# Patient Record
Sex: FEMALE | Race: WHITE | NOT HISPANIC OR LATINO | Employment: FULL TIME | URBAN - METROPOLITAN AREA
[De-identification: names, ages, dates, MRNs, and addresses within clinical notes are randomized per-mention and may not be internally consistent; named-entity substitution may affect disease eponyms.]

---

## 2022-05-03 ENCOUNTER — OFFICE VISIT (OUTPATIENT)
Dept: BARIATRICS | Facility: CLINIC | Age: 57
End: 2022-05-03
Payer: COMMERCIAL

## 2022-05-03 VITALS
BODY MASS INDEX: 36.03 KG/M2 | HEART RATE: 66 BPM | WEIGHT: 229.6 LBS | DIASTOLIC BLOOD PRESSURE: 76 MMHG | TEMPERATURE: 98.6 F | SYSTOLIC BLOOD PRESSURE: 132 MMHG | HEIGHT: 67 IN

## 2022-05-03 DIAGNOSIS — M19.90 ARTHRITIS: ICD-10-CM

## 2022-05-03 DIAGNOSIS — E66.9 OBESITY, CLASS II, BMI 35-39.9: Primary | ICD-10-CM

## 2022-05-03 DIAGNOSIS — E78.5 HYPERLIPIDEMIA: ICD-10-CM

## 2022-05-03 PROBLEM — E66.812 OBESITY, CLASS II, BMI 35-39.9: Status: ACTIVE | Noted: 2022-05-03

## 2022-05-03 PROCEDURE — 99213 OFFICE O/P EST LOW 20 MIN: CPT | Performed by: NURSE PRACTITIONER

## 2022-05-03 RX ORDER — ALBUTEROL SULFATE 90 UG/1
AEROSOL, METERED RESPIRATORY (INHALATION)
COMMUNITY
Start: 2022-03-11

## 2022-05-03 RX ORDER — ALBUTEROL SULFATE 90 UG/1
2 AEROSOL, METERED RESPIRATORY (INHALATION) EVERY 6 HOURS PRN
COMMUNITY
Start: 2022-03-11

## 2022-05-03 RX ORDER — MECLIZINE HCL 12.5 MG/1
12.5 TABLET ORAL 3 TIMES DAILY PRN
COMMUNITY

## 2022-05-03 RX ORDER — MULTIVITAMIN
1 TABLET ORAL DAILY
COMMUNITY

## 2022-05-03 RX ORDER — PROPRANOLOL/HYDROCHLOROTHIAZID 40 MG-25MG
TABLET ORAL
COMMUNITY

## 2022-05-03 RX ORDER — IBUPROFEN 200 MG
200 TABLET ORAL EVERY 6 HOURS PRN
COMMUNITY

## 2022-05-03 NOTE — PROGRESS NOTES
Assessment/Plan:  Michael Randolph was seen today for consult  Diagnoses and all orders for this visit:    Obesity, Class II, BMI 35-39 9  - Discussed options of [facility]-Intensive Lifestyle Intervention Program, Very Low Calorie Diet-VLCD and Conservative Program and the role of weight loss medications  She would likely not qualify for weight loss surgery, but she is not interested in that  - Explained the importance of making lifestyle changes first before starting any anti-obesity medications  Patient should demonstrate lifestyle changes first before anti-obesity medication can be initiated  - Patient is interested in pursuing [facility]ensive Lifestyle Intervention Program  - Initial weight loss goal of 5-10% weight loss for improved health  - Weight loss can improve patient's co-morbid conditions and/or prevent weight-related complications  - Check CMP, TSH, A1C, Lipid panel and fasting insulin  Goals:  Do not skip meals  Food log (ie ) www myfitnesspal com,sparkpeople  com,loseit com,calorieking  com,etc  baritastic (use skinnytaste  com, dietdoctor  com or smartphone lisandra Joonto for recipes)  No sugary beverages  At least 64oz of water daily  Increase physical activity by 10 minutes daily  Gradually increase physical activity to a goal of 5 days per week for 30 minutes of MODERATE intensity PLUS 2 days per week of FULL BODY resistance training (use smartphone apps [employer], Home Workout, etc )  - Try to increase activity as tolerated  She would like to eventually be able to get back to HIIT workouts  - Increase water intake to at least 64 oz daily  Hyperlipidemia    Arthritis      Discussed in detail nonsurgical options including intensive lifestyle intervention program, very low-calorie diet program and conservative program   Discussed the role of weight loss medications  Counseled patient on diet behavior and exercise modification for weight loss        Follow up in approximately 3 months with Non-Surgical Physician/Advanced Practitioner  Subjective:   Chief Complaint   Patient presents with    Consult     MWM s/b -, goal wt 140       Patient ID: Annika Dang  is a 55 y o  female with excess weight/obesity here to pursue weight management  Previous notes and records have been reviewed  Past Medical History:   Diagnosis Date    Arthritis      Past Surgical History:   Procedure Laterality Date    FINGER FRACTURE SURGERY Right        HPI:  Wt Readings from Last 20 Encounters:   22 104 kg (229 lb 9 6 oz)     Obesity/Excess Weight:  Severity: Moderate  Onset:  Since childhood  Modifiers: Diet and Exercise, Physician Supervised Weight Loss Program and Commercial Weight Loss Programs-ie  Weight Watchers, Kin Friday, etc  Was successful in losing weight, but would gain it back  Contributing factors: Stress/Emotional Eating  Associated symptoms: increased joint pain, decreased self esteem and increased shortness of breath    Hydration: 30-60 oz water, 40 oz of black tea, 20 oz coffee with half and half, 1 Kombucha twice a month  Alcohol: 1 drink per week  Smoking: denies  Exercise: Not currently due to ankle pain  Occupation: drug and alcohol   Sleep: 7 hours  STOP ban/8    Highest weight: 245 lbs 6-8 years ago  Current weight: 229 6 lbs  Goal weight: 150 lbs    The following portions of the patient's history were reviewed and updated as appropriate: allergies, current medications, past family history, past medical history, past social history, past surgical history, and problem list     Review of Systems   HENT: Negative for sore throat  Respiratory: Negative for cough and shortness of breath  Cardiovascular: Negative for chest pain and palpitations  Gastrointestinal: Negative for constipation, diarrhea, nausea and vomiting  Denies GERD   Endocrine: Negative for cold intolerance and heat intolerance     Genitourinary: Negative for dysuria  Musculoskeletal: Positive for arthralgias (ankle, hip, knees)  Negative for back pain  Skin: Negative for rash  Neurological: Negative for headaches  Psychiatric/Behavioral: Negative for suicidal ideas (or HI)  Denies depression and anxiety       Objective:  /76 (BP Location: Right arm, Patient Position: Sitting, Cuff Size: Standard)   Pulse 66   Temp 98 6 °F (37 °C) (Tympanic)   Ht 5' 6 5" (1 689 m)   Wt 104 kg (229 lb 9 6 oz)   BMI 36 50 kg/m²     Physical Exam  Vitals and nursing note reviewed  Constitutional   General appearance: Abnormal   well developed and obese  Eyes No conjunctival injection  Ears, Nose, Mouth, and Throat Oral mucosa moist    Pulmonary   Respiratory effort: No increased work of breathing or signs of respiratory distress  Cardiovascular     Examination of extremities for edema and/or varicosities: Normal   no edema  Abdomen   Abdomen: Abnormal   The abdomen was obese      Musculoskeletal   Gait and station: Normal     Psychiatric   Orientation to person, place and time: Normal     Affect: appropriate

## 2022-06-03 LAB — HBA1C MFR BLD HPLC: 5.3 %

## 2022-06-06 ENCOUNTER — OFFICE VISIT (OUTPATIENT)
Dept: BARIATRICS | Facility: CLINIC | Age: 57
End: 2022-06-06

## 2022-06-06 VITALS — HEIGHT: 67 IN | BODY MASS INDEX: 36.09 KG/M2 | WEIGHT: 229.94 LBS

## 2022-06-06 DIAGNOSIS — R63.5 ABNORMAL WEIGHT GAIN: Primary | ICD-10-CM

## 2022-06-06 PROCEDURE — RECHECK: Performed by: DIETITIAN, REGISTERED

## 2022-06-06 PROCEDURE — WMPRO12: Performed by: DIETITIAN, REGISTERED

## 2022-06-06 NOTE — PROGRESS NOTES
Weight Management Medical Nutrition Assessment  presented for the New Start session with the [facility]   Today's weight is 229 94#  Per dietary recall patient consumes excess calories from emotional eating, boredom eating  Gained 30 lbs in covid which she attributes to caring for her mother and baking for her mother  We developed and reviewed a low calorie meal plan         Patient seen by Medical Provider in past 6 months:  yes  Requested to schedule appointment with Medical Provider: No      Anthropometric Measurements  Start Weight (#): 229 94  Current Weight (#): 229 94  TBW % Change from start weight: n/a  Ideal Body Weight (#):132 5  Goal Weight (#):140    Weight Loss History  Previous weight loss attempts: herbalife, nutrisystem, weight watchers, fad diets, whole 30, bariatrician, keto    Food and Nutrition Related History  Wake up: 6am   Bed Time:    Food Recall  Deeds her dogs  Emergen-C, glucosamine, tumeric, fish oil, vitamin D, MVI, 3 motrin  7am Breakfast: 3 eggs with mushrooms (sauteed with olive oil), 1 slice swiss cheese, 1 tbsp salsa-will do oatmeal with nut butter on weekends with SF dried cherries  - coffee with 1/2 and 1/2    10a Snack: 2 clementines  12-12:30p Lunch: cooks on weekends and freezes to use during the week- halupki casserole or stuffed pepper casserole or chili (made with tomatoes, ground meat) or tatooed  meals from [facility]  3p Snack: carrots/cucumbers with hummus or apple with nut butter  5:30-6p Dinner: rotisserie chicken, non-starchy vegetable  Snack: shot of bourbon    Beverages: coffee/tea, flavored seltzer water, water  Volume of beverage intake: 20 oz coffee, 40 z tea (sometimes caffienated), 2-3 12oz cans of seltzer water per week, 64oz water daily    Weekends: Worse, if goes out with friends  Cravings: sweet- likes to bake  Trouble area of day:    Frequency of Eating out: irregularly  Food restrictions:none, raw onions bother her  Cooking: self   Food Shopping: self  - lives by herself    Physical Activity Intake  Activity:activities of daily living and gardening/house work  - like to bike or did BHUMIKA 20-30 minutes programs on [employer]  Frequency:several times per week  Physical limitations/barriers to exercise: ankle pain, arthritis    Estimated Needs  Energy  SECA: [id number]      X 1 3 -1000 =1200  [employer] Needs: BMR : 2073   1-2# loss weekly sedentary:989-1489            1-2# loss weekly lightly active:0699-2489  Maintenance calories for sedentary activity level: 1989  Protein:72-90g      (1 2-1 5g/kg IBW)  Fluid: 70oz    (35mL/kg IBW)    Nutrition Diagnosis  Yes; Overweight/obesity  related to Excess energy intake as evidenced by  BMI more than normative standard for age and sex (obesity-grade II 35-39  9)       Nutrition Intervention    Nutrition Prescription  Calories:3144-9409  Protein:75-90g  Fluid:70oz    Meal Plan (Umberto/Pro/Carb)  Breakfast:200-300/15-20  Snack:100-150/5-10  Lunch:300-400/20-30  Snack:100-150/5-10  Dinner:300-400/20-30  Snack:100-150/5-10    Nutrition Education:    Health[facility] Manual  Calorie controlled menu  Lean protein food choices  Healthy snack options  Food journaling tips      Nutrition Counseling:  Strategies: meal planning, portion sizes, healthy snack choices, hydration, fiber intake, protein intake, exercise, food journal      Monitoring and Evaluation:  Evaluation criteria:  Energy Intake  Meet protein needs  Maintain adequate hydration  Monitor weekly weight  Meal planning/preparation  Food journal   Decreased portions at mealtimes and snacks  Physical activity     Barriers to learning:none  Readiness to change: Preparation:  (Getting ready to change)   Comprehension: very good  Expected Compliance: very good

## 2022-06-16 ENCOUNTER — CLINICAL SUPPORT (OUTPATIENT)
Dept: BARIATRICS | Facility: CLINIC | Age: 57
End: 2022-06-16

## 2022-06-16 VITALS — BODY MASS INDEX: 35.63 KG/M2 | WEIGHT: 227 LBS | HEIGHT: 67 IN

## 2022-06-16 DIAGNOSIS — R63.5 ABNORMAL WEIGHT GAIN: Primary | ICD-10-CM

## 2022-06-16 PROCEDURE — RECHECK

## 2022-06-23 ENCOUNTER — CLINICAL SUPPORT (OUTPATIENT)
Dept: BARIATRICS | Facility: CLINIC | Age: 57
End: 2022-06-23

## 2022-06-23 VITALS — BODY MASS INDEX: 35.56 KG/M2 | HEIGHT: 67 IN | WEIGHT: 226.6 LBS

## 2022-06-23 DIAGNOSIS — R63.5 ABNORMAL WEIGHT GAIN: Primary | ICD-10-CM

## 2022-06-23 PROCEDURE — RECHECK

## 2022-07-07 ENCOUNTER — CLINICAL SUPPORT (OUTPATIENT)
Dept: BARIATRICS | Facility: CLINIC | Age: 57
End: 2022-07-07

## 2022-07-07 VITALS — HEIGHT: 67 IN | BODY MASS INDEX: 35.03 KG/M2 | WEIGHT: 223.2 LBS

## 2022-07-07 DIAGNOSIS — R63.5 ABNORMAL WEIGHT GAIN: Primary | ICD-10-CM

## 2022-07-07 PROCEDURE — RECHECK

## 2022-07-14 ENCOUNTER — OFFICE VISIT (OUTPATIENT)
Dept: BARIATRICS | Facility: CLINIC | Age: 57
End: 2022-07-14

## 2022-07-14 VITALS — BODY MASS INDEX: 35.63 KG/M2 | WEIGHT: 221.7 LBS | HEIGHT: 66 IN

## 2022-07-14 DIAGNOSIS — R63.5 ABNORMAL WEIGHT GAIN: Primary | ICD-10-CM

## 2022-07-14 PROCEDURE — RECHECK: Performed by: DIETITIAN, REGISTERED

## 2022-07-14 NOTE — PROGRESS NOTES
Weight Management Medical Nutrition Assessment  presented for the month 1 f/u  session with the [facility] 8 24# weight loss x 5 weeks (1 6# per week,3 5%)  Patient states that she has decided to not continue in the program at this time  RD notified office staff and appointments/class canceled           Patient seen by Medical Provider in past 6 months:  yes  Requested to schedule appointment with Medical Provider: No      Anthropometric Measurements  Start Weight (#): 229 94  Current Weight (#): 221 7  TBW % Change from start weight:   Ideal Body Weight (#):132 5  Goal Weight (#):140    Weight Loss History  Previous weight loss attempts: herbalife, nutrisystem, weight watchers, fad diets, whole 30, bariatrician, keto    Food and Nutrition Related History  Wake up: 6am   Bed Time:    Food Recall- not assessed today  Deeds her dogs  Emergen-C, glucosamine, tumeric, fish oil, vitamin D, MVI, 3 motrin  7am Breakfast: 3 eggs with mushrooms (sauteed with olive oil), 1 slice swiss cheese, 1 tbsp salsa-will do oatmeal with nut butter on weekends with SF dried cherries  - coffee with 1/2 and 1/2    10a Snack: 2 clementines  12-12:30p Lunch: cooks on weekends and freezes to use during the week- halupki casserole or stuffed pepper casserole or chili (made with tomatoes, ground meat) or tatooed  meals from [employer]  3p Snack: carrots/cucumbers with hummus or apple with nut butter  5:30-6p Dinner: rotisserie chicken, non-starchy vegetable  Snack: shot of bourbon    Beverages: coffee/tea, flavored seltzer water, water  Volume of beverage intake: 20 oz coffee, 40 z tea (sometimes caffienated), 2-3 12oz cans of seltzer water per week, 64oz water daily    Weekends: Worse, if goes out with friends  Cravings: sweet- likes to bake  Trouble area of day:    Frequency of Eating out: irregularly  Food restrictions:none, raw onions bother her  Cooking: self   Food Shopping: self  - lives by herself    Physical Activity Intake- not assessed today  Activity:activities of daily living and gardening/house work  - like to bike or did BHUMIKA 20-30 minutes programs on youtube  Frequency:several times per week  Physical limitations/barriers to exercise: ankle pain, arthritis    Estimated Needs  Energy  SECA: [id number]      X 1 3 -1000 =1200  [employer] [place] [employer] Needs: BMR : 4091   1-2# loss weekly sedentary:989-1489            1-2# loss weekly lightly active:8099-3805  Maintenance calories for sedentary activity level: 1989  Protein:72-90g      (1 2-1 5g/kg IBW)  Fluid: 70oz    (35mL/kg IBW)    Nutrition Diagnosis  Yes; Overweight/obesity  related to Excess energy intake as evidenced by  BMI more than normative standard for age and sex (obesity-grade II 35-39  9)       Nutrition Intervention    Nutrition Prescription  Calories:6175-5749  Protein:75-90g  Fluid:70oz    Meal Plan (Umberto/Pro/Carb)  Breakfast:200-300/15-20  Snack:100-150/5-10  Lunch:300-400/20-30  Snack:100-150/5-10  Dinner:300-400/20-30  Snack:100-150/5-10    Nutrition Education:    Healthy [facility] Manual  Calorie controlled menu  Lean protein food choices  Healthy snack options  Food journaling tips      Nutrition Counseling:  Strategies: meal planning, portion sizes, healthy snack choices, hydration, fiber intake, protein intake, exercise, food journal      Monitoring and Evaluation:  Evaluation criteria:  Energy Intake  Meet protein needs  Maintain adequate hydration  Monitor weekly weight  Meal planning/preparation  Food journal   Decreased portions at mealtimes and snacks  Physical activity     Barriers to learning:none  Readiness to change: Preparation:  (Getting ready to change)   Comprehension: very good  Expected Compliance: very good

## 2023-01-04 ENCOUNTER — APPOINTMENT (OUTPATIENT)
Dept: LAB | Facility: CLINIC | Age: 58
End: 2023-01-04

## 2023-01-04 ENCOUNTER — OCCMED (OUTPATIENT)
Dept: URGENT CARE | Facility: CLINIC | Age: 58
End: 2023-01-04

## 2023-01-04 DIAGNOSIS — Z02.1 PRE-EMPLOYMENT EXAMINATION: ICD-10-CM

## 2023-01-04 DIAGNOSIS — Z02.1 PRE-EMPLOYMENT EXAMINATION: Primary | ICD-10-CM

## 2023-01-05 LAB
GAMMA INTERFERON BACKGROUND BLD IA-ACNC: 0.02 IU/ML
M TB IFN-G BLD-IMP: NEGATIVE
M TB IFN-G CD4+ BCKGRND COR BLD-ACNC: 0 IU/ML
M TB IFN-G CD4+ BCKGRND COR BLD-ACNC: 0 IU/ML
MEV IGG SER QL IA: ABNORMAL
MITOGEN IGNF BCKGRD COR BLD-ACNC: >10 IU/ML
MUV IGG SER QL IA: NORMAL
RUBV IGG SERPL IA-ACNC: >175 IU/ML
VZV IGG SER QL IA: NORMAL

## 2023-03-05 ENCOUNTER — OFFICE VISIT (OUTPATIENT)
Dept: URGENT CARE | Age: 58
End: 2023-03-05

## 2023-03-05 VITALS
BODY MASS INDEX: 37.49 KG/M2 | HEART RATE: 57 BPM | RESPIRATION RATE: 20 BRPM | WEIGHT: 225 LBS | HEIGHT: 65 IN | TEMPERATURE: 99.2 F | OXYGEN SATURATION: 97 %

## 2023-03-05 DIAGNOSIS — J40 BRONCHITIS: Primary | ICD-10-CM

## 2023-03-05 LAB
SARS-COV-2 AG UPPER RESP QL IA: NEGATIVE
VALID CONTROL: NORMAL

## 2023-03-05 RX ORDER — ALBUTEROL SULFATE 90 UG/1
2 AEROSOL, METERED RESPIRATORY (INHALATION) EVERY 6 HOURS PRN
Qty: 18 G | Refills: 0 | Status: SHIPPED | OUTPATIENT
Start: 2023-03-05

## 2023-03-05 RX ORDER — PREDNISONE 20 MG/1
TABLET ORAL
Qty: 10 TABLET | Refills: 0 | Status: SHIPPED | OUTPATIENT
Start: 2023-03-05

## 2023-03-05 NOTE — LETTER
March 5, 2023     Patient: Ozzie Monk   YOB: 1965   Date of Visit: 3/5/2023       To Whom It May Concern:    Above patient seen in office today for acute medical ailment  May attempt return to work in next 1-2 days as tolerated             Sincerely,        Jason Madera PA-C    CC: No Recipients

## 2023-03-05 NOTE — PROGRESS NOTES
[facility]    NAME: Jon Kelley is a 65 y o  female  : 1965    MRN: [id number]  DATE: 2023  TIME: 9:24 AM    Assessment and Plan   Bronchitis [J40]  1  Bronchitis  Poct Covid 19 Rapid Antigen Test    predniSONE 20 mg tablet    albuterol (PROVENTIL HFA,VENTOLIN HFA) 90 mcg/act inhaler          Patient Instructions   Patient Instructions     Take prednisone as instructed  While on prednisone do not take any ibuprofen or ibuprofen like products  May safely take Tylenol if needed  Albuterol inhaler as needed for chest tightness wheezing  Cough expectorant such as Mucinex  Push fluids  Call primary care provider soon as possible to arrange follow-up for the next 7 to 10 days for reevaluation  If you develop any severe chest pain, weakness, shortness of breath proceed immediately to emergency room for further evaluation  Acute Bronchitis   AMBULATORY CARE:   Acute bronchitis  is swelling and irritation in your lungs  It is usually caused by a virus and most often happens in the winter  Bronchitis may also be caused by bacteria or by a chemical irritant, such as smoke  Common symptoms include the following:   • Cough that lasts up to 3 weeks, stuffy nose    • Hoarseness, sore throat    • A fever and chills    • Feeling more tired than usual, and body aches    • Wheezing or pain when you breathe or cough    Seek care immediately if:   • You cough up blood  • Your lips or fingernails turn blue  • You feel like you are not getting enough air when you breathe  Call your doctor if:   • Your symptoms do not go away or get worse, even after treatment  • Your cough does not get better within 4 weeks  • You have questions or concerns about your condition or care  Medicines: You may  need any of the following:  • Cough suppressants  decrease your urge to cough  • Decongestants  help loosen mucus in your lungs and make it easier to cough up   This can help you breathe easier  • Inhalers  may be given  Your healthcare provider may give you one or more inhalers to help you breathe easier and cough less  An inhaler gives your medicine to open your airways  Ask your healthcare provider to show you how to use your inhaler correctly  • Antibiotics  may be given for up to 5 days if your bronchitis is caused by bacteria  • Acetaminophen  decreases pain and fever  It is available without a doctor's order  Ask how much to take and how often to take it  Follow directions  Read the labels of all other medicines you are using to see if they also contain acetaminophen, or ask your doctor or pharmacist  Acetaminophen can cause liver damage if not taken correctly  • NSAIDs  help decrease swelling and pain or fever  This medicine is available with or without a doctor's order  NSAIDs can cause stomach bleeding or kidney problems in certain people  If you take blood thinner medicine, always ask your healthcare provider if NSAIDs are safe for you  Always read the medicine label and follow directions  • Take your medicine as directed  Contact your healthcare provider if you think your medicine is not helping or if you have side effects  Tell your provider if you are allergic to any medicine  Keep a list of the medicines, vitamins, and herbs you take  Include the amounts, and when and why you take them  Bring the list or the pill bottles to follow-up visits  Carry your medicine list with you in case of an emergency  Self-care:   • Drink liquids as directed  You may need to drink more liquids than usual to stay hydrated  Ask how much liquid to drink each day and which liquids are best for you  • Use a cool mist humidifier  to increase air moisture in your home  This may make it easier for you to breathe and help decrease your cough  • Get more rest   Rest helps your body to heal  Slowly start to do more each day  Rest when you feel it is needed      • Avoid irritants in the air  Avoid chemicals, fumes, and dust  Wear a face mask if you must work around dust or fumes  Stay inside on days when air pollution levels are high  If you have allergies, stay inside when pollen counts are high  Do not use aerosol products, such as spray-on deodorant, bug spray, and hair spray  • Do not smoke or be around others who are smoking  Nicotine and other chemicals in cigarettes and cigars can cause lung damage  Ask your healthcare provider for information if you currently smoke and need help to quit  E-cigarettes or smokeless tobacco still contain nicotine  Talk to your healthcare provider before you use these products  Prevent acute bronchitis:       1  Ask about vaccines you may need  Get a flu vaccine each year as soon as recommended, usually in September or October  Ask your healthcare provider if you should also get a pneumonia or COVID-19 vaccine  Your healthcare provider can tell you if you should also get other vaccines, and when to get them  2  Prevent the spread of germs  You can decrease your risk for acute bronchitis and other illnesses by doing the following:    ? Wash your hands often with soap and water  Carry germ-killing hand lotion or gel with you  You can use the lotion or gel to clean your hands when soap and water are not available  ? Do not touch your eyes, nose, or mouth unless you have washed your hands first     ? Always cover your mouth when you cough to prevent the spread of germs  It is best to cough into a tissue or your shirt sleeve instead of into your hand  Ask those around you to cover their mouths when they cough  ? Try to avoid people who have a cold or the flu  If you are sick, stay away from others as much as possible  Follow up with your doctor as directed:  Write down questions you have so you will remember to ask them during your follow-up visits    © Copyright [facility] 2022 Information is for End User's use only and may not be sold, redistributed or otherwise used for commercial purposes  The above information is an  only  It is not intended as medical advice for individual conditions or treatments  Talk to your doctor, nurse or pharmacist before following any medical regimen to see if it is safe and effective for you  Chief Complaint     Chief Complaint   Patient presents with   • Cough     Cough, wheezing, headache, chest congestion x 2 days       History of Present Illness   Ema Anglin presents to the clinic c/o  52-year-old female comes in with complaint of cough wheezing headache chest congestion that started 2 days ago  Started: Couple days ago with mild scratchy throat  Associated signs and symptoms: Sore throat has resolved but does have some little bit of postnasal drip, cough and some chest tightness and wheezing  Wheezing does seem to clear after coughing  Hx asthma or pneumonia: Denies history of asthma or pneumonia  Non-smoker  Did have COVID at the end of 2021  Review of Systems   Review of Systems   Constitutional: Positive for activity change, appetite change, chills and fatigue  Negative for diaphoresis and fever  HENT: Positive for postnasal drip and sore throat  Negative for congestion and rhinorrhea  Respiratory: Positive for cough, chest tightness, shortness of breath and wheezing  Cardiovascular: Negative  Neurological: Positive for headaches         Current Medications     Long-Term Medications   Medication Sig Dispense Refill   • calcium-vitamin D (OSCAL) 250-125 MG-UNIT per tablet Take 1 tablet by mouth daily     • ibuprofen (MOTRIN) 200 mg tablet Take 200 mg by mouth every 6 (six) hours as needed for mild pain     • Multiple Vitamin (multivitamin) tablet Take 1 tablet by mouth daily     • meclizine (ANTIVERT) 12 5 MG tablet Take 12 5 mg by mouth Three times daily as needed (Patient not taking: Reported on 3/5/2023)         Current Allergies     Allergies as of 03/05/2023 - Reviewed 03/05/2023   Allergen Reaction Noted   • Bee venom Anaphylaxis 03/05/2023   • Medical tape Itching and Rash 03/17/2020   • Penicillins Rash 03/23/2015          The following portions of the patient's history were reviewed and updated as appropriate: allergies, current medications, past family history, past medical history, past social history, past surgical history and problem list   Past Medical History:   Diagnosis Date   • Arthritis    • Hyperlipidemia    • Raynaud disease      Past Surgical History:   Procedure Laterality Date   • BUNIONECTOMY Left    • EYE SURGERY Bilateral     laser surgery, to recorrect vision   • FINGER FRACTURE SURGERY Right 1982   • TONSILLECTOMY AND ADENOIDECTOMY       Family History   Adopted: Yes       Objective   Pulse 57   Temp 99 2 °F (37 3 °C)   Resp 20   Ht 5' 5" (1 651 m)   Wt 102 kg (225 lb)   SpO2 97%   BMI 37 44 kg/m²   No LMP recorded  Physical Exam     Physical Exam  Vitals and nursing note reviewed  Constitutional:       General: She is not in acute distress  Appearance: She is well-developed  She is ill-appearing  She is not toxic-appearing or diaphoretic  Comments: Appears mildly ill but in no acute distress  No trismus or conversational dyspnea  HENT:      Head: Normocephalic and atraumatic  Right Ear: Tympanic membrane, ear canal and external ear normal       Left Ear: Tympanic membrane, ear canal and external ear normal       Nose: Congestion and rhinorrhea present  Mouth/Throat:      Mouth: Mucous membranes are moist       Pharynx: Posterior oropharyngeal erythema present  No oropharyngeal exudate  Comments: Cobblestoning posterior pharynx with patchy redness  Eyes:      General:         Right eye: No discharge  Left eye: No discharge  Conjunctiva/sclera: Conjunctivae normal       Pupils: Pupils are equal, round, and reactive to light     Cardiovascular:      Rate and Rhythm: Normal rate and regular rhythm  Heart sounds: Normal heart sounds  No murmur heard  No friction rub  No gallop  Pulmonary:      Effort: Pulmonary effort is normal  No respiratory distress  Breath sounds: No stridor  Wheezing present  No rhonchi or rales  Comments: End expiratory wheezes throughout  Mild cough with deep breath  Musculoskeletal:      Cervical back: Normal range of motion and neck supple  No rigidity or tenderness  Lymphadenopathy:      Cervical: No cervical adenopathy  Skin:     General: Skin is warm and dry  Coloration: Skin is not jaundiced or pale  Findings: No rash  Neurological:      Mental Status: She is alert and oriented to person, place, and time     Psychiatric:         Mood and Affect: Mood normal          Behavior: Behavior normal

## 2023-03-05 NOTE — PATIENT INSTRUCTIONS
Take prednisone as instructed  While on prednisone do not take any ibuprofen or ibuprofen like products  May safely take Tylenol if needed  Albuterol inhaler as needed for chest tightness wheezing  Cough expectorant such as Mucinex  Push fluids  Call primary care provider soon as possible to arrange follow-up for the next 7 to 10 days for reevaluation  If you develop any severe chest pain, weakness, shortness of breath proceed immediately to emergency room for further evaluation  Acute Bronchitis   AMBULATORY CARE:   Acute bronchitis  is swelling and irritation in your lungs  It is usually caused by a virus and most often happens in the winter  Bronchitis may also be caused by bacteria or by a chemical irritant, such as smoke  Common symptoms include the following:   Cough that lasts up to 3 weeks, stuffy nose    Hoarseness, sore throat    A fever and chills    Feeling more tired than usual, and body aches    Wheezing or pain when you breathe or cough    Seek care immediately if:   You cough up blood  Your lips or fingernails turn blue  You feel like you are not getting enough air when you breathe  Call your doctor if:   Your symptoms do not go away or get worse, even after treatment  Your cough does not get better within 4 weeks  You have questions or concerns about your condition or care  Medicines: You may  need any of the following:  Cough suppressants  decrease your urge to cough  Decongestants  help loosen mucus in your lungs and make it easier to cough up  This can help you breathe easier  Inhalers  may be given  Your healthcare provider may give you one or more inhalers to help you breathe easier and cough less  An inhaler gives your medicine to open your airways  Ask your healthcare provider to show you how to use your inhaler correctly  Antibiotics  may be given for up to 5 days if your bronchitis is caused by bacteria      Acetaminophen  decreases pain and fever  It is available without a doctor's order  Ask how much to take and how often to take it  Follow directions  Read the labels of all other medicines you are using to see if they also contain acetaminophen, or ask your doctor or pharmacist  Acetaminophen can cause liver damage if not taken correctly  NSAIDs  help decrease swelling and pain or fever  This medicine is available with or without a doctor's order  NSAIDs can cause stomach bleeding or kidney problems in certain people  If you take blood thinner medicine, always ask your healthcare provider if NSAIDs are safe for you  Always read the medicine label and follow directions  Take your medicine as directed  Contact your healthcare provider if you think your medicine is not helping or if you have side effects  Tell your provider if you are allergic to any medicine  Keep a list of the medicines, vitamins, and herbs you take  Include the amounts, and when and why you take them  Bring the list or the pill bottles to follow-up visits  Carry your medicine list with you in case of an emergency  Self-care:   Drink liquids as directed  You may need to drink more liquids than usual to stay hydrated  Ask how much liquid to drink each day and which liquids are best for you  Use a cool mist humidifier  to increase air moisture in your home  This may make it easier for you to breathe and help decrease your cough  Get more rest   Rest helps your body to heal  Slowly start to do more each day  Rest when you feel it is needed  Avoid irritants in the air  Avoid chemicals, fumes, and dust  Wear a face mask if you must work around dust or fumes  Stay inside on days when air pollution levels are high  If you have allergies, stay inside when pollen counts are high  Do not use aerosol products, such as spray-on deodorant, bug spray, and hair spray  Do not smoke or be around others who are smoking    Nicotine and other chemicals in cigarettes and cigars can cause lung damage  Ask your healthcare provider for information if you currently smoke and need help to quit  E-cigarettes or smokeless tobacco still contain nicotine  Talk to your healthcare provider before you use these products  Prevent acute bronchitis:       Ask about vaccines you may need  Get a flu vaccine each year as soon as recommended, usually in September or October  Ask your healthcare provider if you should also get a pneumonia or COVID-19 vaccine  Your healthcare provider can tell you if you should also get other vaccines, and when to get them  Prevent the spread of germs  You can decrease your risk for acute bronchitis and other illnesses by doing the following:    Wash your hands often with soap and water  Carry germ-killing hand lotion or gel with you  You can use the lotion or gel to clean your hands when soap and water are not available  Do not touch your eyes, nose, or mouth unless you have washed your hands first     Always cover your mouth when you cough to prevent the spread of germs  It is best to cough into a tissue or your shirt sleeve instead of into your hand  Ask those around you to cover their mouths when they cough  Try to avoid people who have a cold or the flu  If you are sick, stay away from others as much as possible  Follow up with your doctor as directed:  Write down questions you have so you will remember to ask them during your follow-up visits  © Copyright [employer] 2022 Information is for End User's use only and may not be sold, redistributed or otherwise used for commercial purposes  The above information is an  only  It is not intended as medical advice for individual conditions or treatments  Talk to your doctor, nurse or pharmacist before following any medical regimen to see if it is safe and effective for you

## 2023-04-17 PROBLEM — N92.1 BREAKTHROUGH BLEEDING WITH IUD: Status: ACTIVE | Noted: 2023-04-17

## 2023-04-17 PROBLEM — N39.3 STRESS INCONTINENCE: Status: ACTIVE | Noted: 2018-11-27

## 2023-04-17 PROBLEM — Z97.5 BREAKTHROUGH BLEEDING WITH IUD: Status: ACTIVE | Noted: 2023-04-17

## 2023-04-17 PROBLEM — R87.612 LGSIL ON PAP SMEAR OF CERVIX: Status: ACTIVE | Noted: 2018-11-30

## 2023-04-19 DIAGNOSIS — N93.9 ABNORMAL UTERINE BLEEDING (AUB): ICD-10-CM

## 2023-04-19 DIAGNOSIS — N92.1 BREAKTHROUGH BLEEDING WITH IUD: Primary | ICD-10-CM

## 2023-04-19 DIAGNOSIS — Z97.5 BREAKTHROUGH BLEEDING WITH IUD: Primary | ICD-10-CM

## 2023-04-26 ENCOUNTER — TELEPHONE (OUTPATIENT)
Dept: OBGYN CLINIC | Facility: MEDICAL CENTER | Age: 58
End: 2023-04-26

## 2023-05-02 ENCOUNTER — HOSPITAL ENCOUNTER (OUTPATIENT)
Dept: ULTRASOUND IMAGING | Facility: MEDICAL CENTER | Age: 58
Discharge: HOME | End: 2023-05-02

## 2023-05-02 DIAGNOSIS — N92.1 BREAKTHROUGH BLEEDING WITH IUD: ICD-10-CM

## 2023-05-02 DIAGNOSIS — Z97.5 BREAKTHROUGH BLEEDING WITH IUD: ICD-10-CM

## 2023-05-02 DIAGNOSIS — N93.9 ABNORMAL UTERINE BLEEDING (AUB): ICD-10-CM

## 2023-05-09 NOTE — PROGRESS NOTES
"Pap   4/17/23-  ASCUS + other HPV and HPV 18 positive       Colposcopy     Date/Time 5/10/2023 2:15 PM     Universal Protocol   Consent: Verbal consent obtained  Written consent not obtained  Risks and benefits: risks, benefits and alternatives were discussed  Consent given by: patient  Time out: Immediately prior to procedure a \"time out\" was called to verify the correct patient, procedure, equipment, support staff and site/side marked as required  Patient understanding: patient states understanding of the procedure being performed  Patient consent: the patient's understanding of the procedure matches consent given  Procedure consent: procedure consent matches procedure scheduled  Required items: required blood products, implants, devices, and special equipment available  Patient identity confirmed: verbally with patient       Performed by  Hugo Karimi MD     Authorized by Hugo Karimi MD        Pre-procedure details     Pre-procedure timeout performed: yes       Indication    ASC-US     Procedure Details   Procedure: Colposcopy w/ cervical biopsy and ECC      Under satisfactory analgesia the patient was prepped and draped in the dorsal lithotomy position: yes      [place] speculum was placed in the vagina: yes      Under colposcopic examination the transition zone was seen in entirety: yes      Intracervical block was performed: no      Endocervix was curetted using a Kevorkian curette: yes      Cervical biopsy performed with a cervical biopsy punch: yes      Tampon inserted: no      Monsel's solution was applied: no      Biopsy(s): yes      Location:  5    Specimen to pathology: yes       Post-procedure      Patient tolerance of procedure: Tolerated well, no immediate complications   Endometrial biopsy    Date/Time: 5/10/2023 2:15 PM  Performed by: Hugo Karimi MD  Authorized by: Hugo Karimi MD   Universal Protocol:  Consent: Verbal consent obtained  Written consent obtained    Risks and benefits: " "risks, benefits and alternatives were discussed  Consent given by: patient  Time out: Immediately prior to procedure a \"time out\" was called to verify the correct patient, procedure, equipment, support staff and site/side marked as required  Patient understanding: patient states understanding of the procedure being performed  Patient consent: the patient's understanding of the procedure matches consent given  Procedure consent: procedure consent matches procedure scheduled  Required items: required blood products, implants, devices, and special equipment available      Indication:     Indications: Post-menopausal bleeding      Chronicity of post-menopausal bleeding:  New  Procedure:     Procedure: endometrial biopsy with [place]      A bivalve speculum was placed in the vagina: yes      Cervix cleaned and prepped: yes      A paracervical block was performed: no      An intracervical block was performed: no      The cervix was dilated: yes      Uterus sounded: yes      Uterus sound depth (cm):  10    Specimen collected: specimen collected and sent to pathology    Findings:     Uterus size:  <6 weeks    Cervix: normal      Adnexa: normal                Bleeding around the IUD   Concern for PMB   Since she is 62 y/o  She reports that she is about 8 years on the IUD   Results of work up to date below     Labs _   [facility] - 35 9  [facility] - 16 4    Consistent with early Menopause   Not the higher limits but elevated    Sonogram     ENDOMETRIUM:  The endometrial echo complex has an AP caliber of 3 0 mm   IUD noted, centrally located within the endometrial cavity  Visualized endometrial echo complex otherwise within normal limits  We went over the following   1  The levels of LH and FSh are elevated could show menopause  2  IUD should come out in July and do not think she will need another IUD  She will have a withdrawal bleed bc of the progesterone in the IUD being removed     3   Abnormal pap  Will need paps x 20 years " from normal   4    The sonogram results of the endostrip being less then 4 mm

## 2023-05-10 ENCOUNTER — TELEPHONE (OUTPATIENT)
Dept: OTHER | Facility: OTHER | Age: 58
End: 2023-05-10

## 2023-05-10 ENCOUNTER — PROCEDURE VISIT (OUTPATIENT)
Dept: OBGYN CLINIC | Facility: MEDICAL CENTER | Age: 58
End: 2023-05-10

## 2023-05-10 VITALS — BODY MASS INDEX: 41.22 KG/M2 | WEIGHT: 247.4 LBS | HEIGHT: 65 IN

## 2023-05-10 DIAGNOSIS — R87.610 ASCUS WITH POSITIVE HIGH RISK HPV CERVICAL: Primary | ICD-10-CM

## 2023-05-10 DIAGNOSIS — Z97.5 BREAKTHROUGH BLEEDING WITH IUD: ICD-10-CM

## 2023-05-10 DIAGNOSIS — R87.810 ASCUS WITH POSITIVE HIGH RISK HPV CERVICAL: Primary | ICD-10-CM

## 2023-05-10 DIAGNOSIS — N92.1 BREAKTHROUGH BLEEDING WITH IUD: ICD-10-CM

## 2023-05-10 NOTE — TELEPHONE ENCOUNTER
Returned pt call, LM  On the message, pt was advised to call insurance company and call back with further questions  Instructed pt to asked for CV office

## 2023-05-10 NOTE — TELEPHONE ENCOUNTER
Patient would like a call back from the office to discuss the upcoming procedure that was rescheduled to be done at the hospital  Patient is concerned she may receive a bill for having it done at a hospital setting    Please call back to discuss further

## 2023-05-10 NOTE — TELEPHONE ENCOUNTER
Pt called in again and I warm transferred her over to Kyra at the [facility]

## 2023-05-24 ENCOUNTER — OFFICE VISIT (OUTPATIENT)
Dept: DERMATOLOGY | Facility: CLINIC | Age: 58
End: 2023-05-24

## 2023-05-24 VITALS — HEIGHT: 65 IN | TEMPERATURE: 98.7 F | BODY MASS INDEX: 41.15 KG/M2 | WEIGHT: 247 LBS

## 2023-05-24 DIAGNOSIS — L72.8 INFUNDIBULAR FOLLICULAR CYST OF SKIN: ICD-10-CM

## 2023-05-24 DIAGNOSIS — Z12.83 SCREENING FOR MALIGNANT NEOPLASM OF SKIN: ICD-10-CM

## 2023-05-24 DIAGNOSIS — L30.9 DERMATITIS: Primary | ICD-10-CM

## 2023-05-24 DIAGNOSIS — M67.80 CYST OF TENDON SHEATH: ICD-10-CM

## 2023-05-24 DIAGNOSIS — D22.5 MELANOCYTIC NEVI OF TRUNK: ICD-10-CM

## 2023-05-24 NOTE — PROGRESS NOTES
"[facility] Note     Patient Name: Destinee Jackson  Encounter Date: 5/24/2023    Have you been cared for by a [place] [address] Dermatologist in the last 3 years and, if so, which description applies to you? NO  I am considered a \"new\" patient and must complete all patient intake questions  I am FEMALE/of child-bearing potential     REVIEW OF SYSTEMS:  Have you recently had or currently have any of the following? · Recent fever or chills? No  · Any non-healing wound? No  · Are you pregnant or planning to become pregnant? No  · Are you currently or planning to be nursing or breast feeding? No   PAST MEDICAL HISTORY:  Have you personally ever had or currently have any of the following? If \"YES,\" then please provide more detail  · Skin cancer (such as Melanoma, Basal Cell Carcinoma, Squamous Cell Carcinoma? No  · Tuberculosis, HIV/AIDS, Hepatitis B or C: No  · Systemic Immunosuppression such as Diabetes, Biologic or Immunotherapy, Chemotherapy, Organ Transplantation, Bone Marrow Transplantation No  · Radiation Treatment No   FAMILY HISTORY:  Any \"first degree relatives\" (parent, brother, sister, or child) with the following? • Skin Cancer, Pancreatic or Other Cancer? No   PATIENT EXPERIENCE:    • Do you want the Dermatologist to perform a COMPLETE skin exam today including a clinical examination under the \"bra and underwear\" areas? NO  • If necessary, do we have your permission to call and leave a detailed message on your Preferred Phone number that includes your specific medical information?   Yes      Allergies   Allergen Reactions   • Bee Venom Anaphylaxis   • Medical Tape Itching and Rash     Holter and Telemetry patches   • Penicillins Rash      Current Outpatient Medications:   •  albuterol (PROVENTIL HFA,VENTOLIN HFA) 90 mcg/act inhaler, Inhale 2 puffs every 6 (six) hours as needed for wheezing (Patient not taking: Reported on 4/17/2023), Disp: 18 g, Rfl: 0  •  calcium-vitamin D (OSCAL) 250-125 " MG-UNIT per tablet, Take 1 tablet by mouth daily, Disp: , Rfl:   •  glucosamine-chondroitin 500-400 MG tablet, Take 1 tablet by mouth 3 (three) times a day, Disp: , Rfl:   •  ibuprofen (MOTRIN) 200 mg tablet, Take 200 mg by mouth every 6 (six) hours as needed for mild pain, Disp: , Rfl:   •  Multiple Vitamin (multivitamin) tablet, Take 1 tablet by mouth daily, Disp: , Rfl:   •  nystatin-triamcinolone (MYCOLOG-II) ointment, Apply topically 2 (two) times a day, Disp: 30 g, Rfl: 0  •  Omega-3 Fatty Acids (FISH OIL PO), Take by mouth in the morning, Disp: , Rfl:   •  Turmeric 500 MG CAPS, Take by mouth, Disp: , Rfl:           • Whom besides the patient is providing clinical information about today's encounter?   o NO ADDITIONAL HISTORIAN (patient alone provided history)    Physical Exam and Assessment/Plan by Diagnosis:      CYST  Physical Exam:  • Anatomic Location Affected:  Upper back   Morphological Description:      •   • Pertinent Positives:  • Pertinent Negatives: Additional History of Present Condition:  Present for a year  Skin lesion is painful  She want to discuss treatment options, possible excision  Assessment and Plan:  Based on a thorough discussion of this condition and the management approach to it (including a comprehensive discussion of the known risks, side effects and potential benefits of treatment), the patient (family) agrees to implement the following specific plan:  • Discussed surgical options  • Surgical excision scheduled with Dr Sedrick Berger on 7/25 in the afternoon at              Tendon/ganglion-type cYST   Physical Exam:  • Anatomic Location Affected: Right hand  Morphological Description:      •   • Pertinent Positives:  • Pertinent Negatives: Additional History of Present Condition:  The patient complains of tenderness with direct pressure       Assessment and Plan:  Based on a thorough discussion of this condition and the management approach to it (including a comprehensive discussion of the known risks, side effects and potential benefits of treatment), the patient (family) agrees to implement the following specific plan:  • Recommended to see a hand surgeon to eval and treat  •   •   •   •   •     DERMATITIS, probably seborrheic    Physical Exam:  • Anatomic Location Affected:  Forehead and left ear  Morphological Description:          •   • Pertinent Positives:  • Pertinent Negatives: Additional History of Present Condition:  Presents for months     Assessment and Plan:  Based on a thorough discussion of this condition and the management approach to it (including a comprehensive discussion of the known risks, side effects and potential benefits of treatment), the patient (family) agrees to implement the following specific plan:    · Start prescription cream to forehead and left ear  Prescription sent to local pharmacy   · Follow up as needed  ·       NEVI  Physical Exam:  • Anatomic Location Affected:  Back   Morphological Description: All lesions examined dermoscopically and found to have benign features    •   • Pertinent Positives:  • Pertinent Negatives: Additional History of Present Condition: Duration many years  Assessment and Plan:  Based on a thorough discussion of this condition and the management approach to it (including a comprehensive discussion of the known risks, side effects and potential benefits of treatment), the patient (family) agrees to implement the following specific plan:  • Return as soon as possibl  • e with any new or changing skin lesions  • Recheck in 6 months, then at least yearly  Monitor at home, too    • When outside we recommend using a wide brim hat, sunglasses, long sleeve and pants, sunscreen with SPF 32+ with reapplication every 2 hours, or SPF specific clothing     •   •   •       Scribe Attestation    I,:  Rubi Grant am acting as a scribe while in the presence of the attending physician :       I,:  Marty Palacios Teetee Wilson MD personally performed the services described in this documentation    as scribed in my presence :

## 2023-05-24 NOTE — PATIENT INSTRUCTIONS
What is skin cancer? Skin cancer is unfortunately very common  That's why we are here to help you on your journey to healthy happy skin! There are two main types of skin cancer: melanoma and non-melanoma skin cancer  Melanoma is a form of skin cancer that often arises within an existing nevus or mole  However, this is not always the case  Melanoma can arise anywhere (not only where you have moles right now)  Melanoma can run in families, so letting us know about your family history is important  Non-melanoma skin cancer is the most common type of cancer in the [place]  The two main types of non-melanoma skin cancers are basal cell carcinomas (BCC) and squamous cell carcinoma (SCC)  These cancers tend to be less aggressive than melanomas but are still important to look for and treat  What can I do to prevent skin cancer? One of the largest risk factors for skin cancer is sun exposure or UV radiation  Therefore, sun protection is gamez! Here are some great tips for protecting yourself! Try to avoid direct sun exposure during peak sun hours (10 AM to 2 PM)  Remember you get A LOT of sun even under cloud coverage and through care windows! When choosing a sunscreen, look for one that says “broad spectrum” sunscreen  This means it protects you from more of the harmful UV rays  Choose a sunscreen that is SPF 30 or greater for best protection  Apply sunscreen to all sun-exposed skin and reapply every 2 hours  Consider sun protective clothing! Great additions to your sun protective clothing wardrobe include broad brimmed hats, sunglasses, UPF clothing  Avoid tanning salons  These have been shown to be very harmful in terms of your risk of skin cancer  Avoid “base tans”  We now know that tans are dangerous (not just sun burns)  If you want to have a tan for a trip, consider a spray tan! Should I check my skin at home between my dermatology appointments? Yes!  It's always a great idea to look at your skin on a regular basis  Here are some things to look for when monitoring your skin  For melanoma, look for the ABCDE's! A = Asymmetry  Look for a spot where one half does not match the other! B = Borders  Look for a spot that has jagged, ragged or irregular borders  C = Color  Look for a spot that is not evenly colored and often includes multiple colors, especially true black, red, white, blue, grey  D = Diameter  Look for a spot that is larger than the size of a pencil eraser  E = Evolution  If you ever have a spot that is changing in shape, color, size or symptoms (becomes itchy, painful or starts to bleed), always call us! For non-melanoma skin cancers, look for a new, pink spot that is not going away, especially one that is itchy, painful or bleeding  What should I do if I see a spot that is concerning for melanoma or non-melanoma skin cancer? If you are ever concerned, call us! Do not wait for your next appointment  We want to help!

## 2023-06-08 ENCOUNTER — NURSE TRIAGE (OUTPATIENT)
Dept: OTHER | Facility: OTHER | Age: 58
End: 2023-06-08

## 2023-06-08 RX ORDER — SODIUM CHLORIDE, SODIUM LACTATE, POTASSIUM CHLORIDE, CALCIUM CHLORIDE 600; 310; 30; 20 MG/100ML; MG/100ML; MG/100ML; MG/100ML
50 INJECTION, SOLUTION INTRAVENOUS CONTINUOUS
Status: CANCELLED | OUTPATIENT
Start: 2023-06-08

## 2023-06-08 NOTE — TELEPHONE ENCOUNTER
Patient with questions regarding upcoming procedure  Please follow up    Reason for Disposition  • Colonoscopy, questions about    Protocols used: COLONOSCOPY SYMPTOMS AND QUESTIONS-ADULT-[facility]

## 2023-06-08 NOTE — TELEPHONE ENCOUNTER
"Regarding: question regarding procedure  ----- Message from Mahin Baires sent at 6/8/2023 12:38 PM EDT -----  \" I have a procedure scheduled for Monday and it says no jewelry but I have a ring on the middle finger of my right hand that I can't remove because my knuckle has gotten to big  Will this be a problem? \"    "

## 2023-06-09 DIAGNOSIS — Z12.11 SCREENING FOR COLON CANCER: Primary | ICD-10-CM

## 2023-06-09 RX ORDER — BISACODYL 5 MG/1
TABLET, DELAYED RELEASE ORAL
Qty: 2 TABLET | Refills: 0 | Status: SHIPPED | OUTPATIENT
Start: 2023-06-09

## 2023-06-09 NOTE — TELEPHONE ENCOUNTER
Patient walked into requesting a different prep medication for procedure on Monday and requested it be sent to pharmacy  Patient has instructions for Golytely and request sent to provider

## 2023-06-12 ENCOUNTER — ANESTHESIA (OUTPATIENT)
Dept: GASTROENTEROLOGY | Facility: HOSPITAL | Age: 58
End: 2023-06-12

## 2023-06-12 ENCOUNTER — ANESTHESIA EVENT (OUTPATIENT)
Dept: GASTROENTEROLOGY | Facility: HOSPITAL | Age: 58
End: 2023-06-12

## 2023-06-12 ENCOUNTER — HOSPITAL ENCOUNTER (OUTPATIENT)
Dept: GASTROENTEROLOGY | Facility: HOSPITAL | Age: 58
Setting detail: OUTPATIENT SURGERY
Discharge: HOME | End: 2023-06-12
Attending: INTERNAL MEDICINE | Admitting: INTERNAL MEDICINE
Payer: COMMERCIAL

## 2023-06-12 VITALS
DIASTOLIC BLOOD PRESSURE: 58 MMHG | BODY MASS INDEX: 41.15 KG/M2 | RESPIRATION RATE: 16 BRPM | HEIGHT: 65 IN | SYSTOLIC BLOOD PRESSURE: 114 MMHG | HEART RATE: 58 BPM | WEIGHT: 247 LBS | OXYGEN SATURATION: 97 % | TEMPERATURE: 98.3 F

## 2023-06-12 DIAGNOSIS — Z86.010 HISTORY OF COLON POLYPS: ICD-10-CM

## 2023-06-12 PROCEDURE — 88305 TISSUE EXAM BY PATHOLOGIST: CPT | Performed by: PATHOLOGY

## 2023-06-12 RX ORDER — PROPOFOL 10 MG/ML
INJECTION, EMULSION INTRAVENOUS CONTINUOUS PRN
Status: DISCONTINUED | OUTPATIENT
Start: 2023-06-12 | End: 2023-06-12

## 2023-06-12 RX ORDER — SIMETHICONE 20 MG/.3ML
EMULSION ORAL AS NEEDED
Status: COMPLETED | OUTPATIENT
Start: 2023-06-12 | End: 2023-06-12

## 2023-06-12 RX ORDER — PROPOFOL 10 MG/ML
INJECTION, EMULSION INTRAVENOUS AS NEEDED
Status: DISCONTINUED | OUTPATIENT
Start: 2023-06-12 | End: 2023-06-12

## 2023-06-12 RX ORDER — SODIUM CHLORIDE, SODIUM LACTATE, POTASSIUM CHLORIDE, CALCIUM CHLORIDE 600; 310; 30; 20 MG/100ML; MG/100ML; MG/100ML; MG/100ML
50 INJECTION, SOLUTION INTRAVENOUS CONTINUOUS
Status: DISCONTINUED | OUTPATIENT
Start: 2023-06-12 | End: 2023-06-16 | Stop reason: HOSPADM

## 2023-06-12 RX ADMIN — PROPOFOL 180 MCG/KG/MIN: 10 INJECTION, EMULSION INTRAVENOUS at 12:48

## 2023-06-12 RX ADMIN — SIMETHICONE 40 MG: 20 EMULSION ORAL at 12:52

## 2023-06-12 RX ADMIN — PROPOFOL 150 MG: 10 INJECTION, EMULSION INTRAVENOUS at 12:47

## 2023-06-12 RX ADMIN — SODIUM CHLORIDE, SODIUM LACTATE, POTASSIUM CHLORIDE, AND CALCIUM CHLORIDE 50 ML/HR: .6; .31; .03; .02 INJECTION, SOLUTION INTRAVENOUS at 09:59

## 2023-06-12 NOTE — H&P
History and Physical - [facility]  Verna Hoffman 63 y o  female MRN: [id number]                  HPI: Verna Hoffman is a 63y o  year old female who presents for colonoscopy  REVIEW OF SYSTEMS: Per the HPI, and otherwise unremarkable      Historical Information   Past Medical History:   Diagnosis Date   • Arthritis    • Hyperlipidemia    • Raynaud disease      Past Surgical History:   Procedure Laterality Date   • BUNIONECTOMY Left    • EYE SURGERY Bilateral     laser surgery, to recorrect vision   • FINGER FRACTURE SURGERY Right 1982   • TONSILLECTOMY AND ADENOIDECTOMY       Social History   Social History     Substance and Sexual Activity   Alcohol Use Yes   • Alcohol/week: 4 0 standard drinks of alcohol   • Types: 4 Glasses of wine per week    Comment: Usually socially     Social History     Substance and Sexual Activity   Drug Use Never     Social History     Tobacco Use   Smoking Status Never   Smokeless Tobacco Never     Family History   Adopted: Yes   Problem Relation Age of Onset   • Pulmonary embolism Son        Meds/Allergies       Current Outpatient Medications:   •  bisacodyl (DULCOLAX) 5 mg EC tablet  •  calcium-vitamin D (OSCAL) 250-125 MG-UNIT per tablet  •  ciclopirox (LOPROX) 0 77 % cream  •  glucosamine-chondroitin 500-400 MG tablet  •  ibuprofen (MOTRIN) 200 mg tablet  •  Multiple Vitamin (multivitamin) tablet  •  nystatin-triamcinolone (MYCOLOG-II) ointment  •  Omega-3 Fatty Acids (FISH OIL PO)  •  polyethylene glycol (GOLYTELY) 4000 mL solution  •  Turmeric 500 MG CAPS  •  albuterol (PROVENTIL HFA,VENTOLIN HFA) 90 mcg/act inhaler    Current Facility-Administered Medications:   •  lactated ringers infusion, 50 mL/hr, Intravenous, Continuous, 50 mL/hr at 06/12/23 0959    Allergies   Allergen Reactions   • Bee Venom Anaphylaxis   • Medical Tape Itching and Rash     Holter and Telemetry patches   • Penicillins Rash       Objective     /72   Pulse 62   Temp (!) 96 7 °F "(35 9 °C) (Temporal)   Resp 18   Ht 5' 5\" (1 651 m)   Wt 112 kg (247 lb)   SpO2 96%   BMI 41 10 kg/m²       PHYSICAL EXAM    Gen: NAD  Head: NCAT  CV: RRR  CHEST: Clear  ABD: soft, NT/ND  EXT: no edema      ASSESSMENT/PLAN:  This is a 63y o  year old female here for colonoscopy and she is stable and optimized for her procedure        "

## 2023-06-12 NOTE — ANESTHESIA POSTPROCEDURE EVALUATION
Post-Op Assessment Note    CV Status:  Stable  Pain Score: 0    Pain management: adequate     Mental Status:  Alert and awake   Hydration Status:  Euvolemic   PONV Controlled:  Controlled   Airway Patency:  Patent      Post Op Vitals Reviewed: Yes      Staff: CRNA         No notable events documented      BP   114/54   Temp 97   Pulse 61   Resp 14   SpO2 97

## 2023-06-12 NOTE — ANESTHESIA PREPROCEDURE EVALUATION
"Procedure:  COLONOSCOPY    Relevant Problems   CARDIO   (+) Hyperlipidemia      MUSCULOSKELETAL   (+) Arthritis      Other   (+) Obesity, Class II, BMI 35-39 9        Physical Exam    Airway    Mallampati score: II  TM Distance: >3 FB  Neck ROM: full     Dental       Cardiovascular  Cardiovascular exam normal    Pulmonary  Pulmonary exam normal     Other Findings        Anesthesia Plan  ASA Score- 3     Anesthesia Type- IV sedation with anesthesia with ASA Monitors  Additional Monitors:   Airway Plan:           Plan Factors-Exercise tolerance (METS): >4 METS  Chart reviewed  Existing labs reviewed  Patient summary reviewed  Patient is not a current smoker  Patient not instructed to abstain from smoking on day of procedure  Patient did not smoke on day of surgery  Induction-     Postoperative Plan-     Informed Consent- Anesthetic plan and risks discussed with patient  I personally reviewed this patient with the CRNA  Discussed and agreed on the Anesthesia Plan with the CRNA               Lab Results   Component Value Date    HGBA1C 5 3 06/03/2022       No results found for: \"ALKPHOS\", \"ALT\", \"ANIONGAP\", \"AST\", \"BILITOT\", \"BUN\", \"CALCIUM\", \"CL\", \"CO2\", \"CORRECTEDCA\", \"CREATININE\", \"EGFR\", \"GLUCOSE\", \"GLUF\", \"K\", \"NA\", \"PROT\"    No results found for: \"HCT\", \"HGB\", \"MCV\", \"PLT\", \"WBC\"     "

## 2023-06-15 PROCEDURE — 88305 TISSUE EXAM BY PATHOLOGIST: CPT | Performed by: PATHOLOGY

## 2023-06-17 ENCOUNTER — APPOINTMENT (OUTPATIENT)
Dept: LAB | Age: 58
End: 2023-06-17

## 2023-06-17 DIAGNOSIS — Z00.8 HEALTH EXAMINATION IN POPULATION SURVEY: ICD-10-CM

## 2023-06-17 LAB
CHOLEST SERPL-MCNC: 238 MG/DL
EST. AVERAGE GLUCOSE BLD GHB EST-MCNC: 105 MG/DL
HBA1C MFR BLD: 5.3 %
HDLC SERPL-MCNC: 77 MG/DL
LDLC SERPL CALC-MCNC: 138 MG/DL (ref 0–100)
NONHDLC SERPL-MCNC: 161 MG/DL
TRIGL SERPL-MCNC: 116 MG/DL

## 2023-06-17 PROCEDURE — 83036 HEMOGLOBIN GLYCOSYLATED A1C: CPT

## 2023-06-17 PROCEDURE — 36415 COLL VENOUS BLD VENIPUNCTURE: CPT

## 2023-06-17 PROCEDURE — 80061 LIPID PANEL: CPT

## 2023-06-27 ENCOUNTER — HOSPITAL ENCOUNTER (OUTPATIENT)
Dept: MAMMOGRAPHY | Facility: MEDICAL CENTER | Age: 58
Discharge: HOME | End: 2023-06-27
Payer: COMMERCIAL

## 2023-06-27 VITALS — BODY MASS INDEX: 41.14 KG/M2 | WEIGHT: 246.91 LBS | HEIGHT: 65 IN

## 2023-06-27 DIAGNOSIS — Z12.31 SCREENING MAMMOGRAM, ENCOUNTER FOR: ICD-10-CM

## 2023-06-27 PROCEDURE — 77067 SCR MAMMO BI INCL CAD: CPT

## 2023-06-27 PROCEDURE — 77063 BREAST TOMOSYNTHESIS BI: CPT

## 2023-07-19 PROCEDURE — 88305 TISSUE EXAM BY PATHOLOGIST: CPT | Performed by: PATHOLOGY

## 2023-07-20 ENCOUNTER — LAB REQUISITION (OUTPATIENT)
Dept: LAB | Facility: HOSPITAL | Age: 58
End: 2023-07-20
Payer: COMMERCIAL

## 2023-07-20 DIAGNOSIS — D48.5 NEOPLASM OF UNCERTAIN BEHAVIOR OF SKIN: ICD-10-CM

## 2023-07-28 PROCEDURE — 88305 TISSUE EXAM BY PATHOLOGIST: CPT | Performed by: PATHOLOGY

## 2023-09-24 PROBLEM — Z30.432 ENCOUNTER FOR IUD REMOVAL: Status: ACTIVE | Noted: 2023-04-17

## 2023-09-25 ENCOUNTER — PROCEDURE VISIT (OUTPATIENT)
Dept: OBGYN CLINIC | Facility: MEDICAL CENTER | Age: 58
End: 2023-09-25
Payer: COMMERCIAL

## 2023-09-25 VITALS
BODY MASS INDEX: 39.15 KG/M2 | HEIGHT: 65 IN | SYSTOLIC BLOOD PRESSURE: 118 MMHG | WEIGHT: 235 LBS | DIASTOLIC BLOOD PRESSURE: 70 MMHG

## 2023-09-25 DIAGNOSIS — Z30.432 ENCOUNTER FOR IUD REMOVAL: Primary | ICD-10-CM

## 2023-09-25 PROBLEM — K63.5 COLON POLYPS: Status: ACTIVE | Noted: 2017-05-05

## 2023-09-25 PROCEDURE — 58301 REMOVE INTRAUTERINE DEVICE: CPT | Performed by: NURSE PRACTITIONER

## 2023-09-25 NOTE — PROGRESS NOTES
Iud removal    Date/Time: 9/25/2023 5:30 PM    Performed by: CONSTANCE Garnett  Authorized by: CONSTANCE Garnett  Universal Protocol:  Consent: Verbal consent obtained. Written consent obtained. Risks and benefits: risks, benefits and alternatives were discussed  Consent given by: patient  Time out: Immediately prior to procedure a "time out" was called to verify the correct patient, procedure, equipment, support staff and site/side marked as required. Timeout called at: 9/25/2023 5:05 PM.  Patient understanding: patient states understanding of the procedure being performed  Patient consent: the patient's understanding of the procedure matches consent given  Procedure consent: procedure consent matches procedure scheduled  Relevant documents: relevant documents present and verified  Test results available and properly labeled: na.  Site marked: the operative site was not marked  Radiology Images displayed and confirmed. If images not available, report reviewed: imaging studies not available  Required items: required blood products, implants, devices, and special equipment available  Patient identity confirmed: verbally with patient      Procedure:     Removed with no complications: yes      Removal due to mechanical complications of IUD: no      Removal due to infection and inflammatory reaction: no      Other reason for removal:  Due for removal  Comments:      P1 here for IUD removal.  Procedure reviewed. Consent reviewed and signed. Patient verbalized understanding and desires removal at today's visit. IUD removed without difficulty, intact. Patient tolerated well.   Signs and symptoms to report reviewed  Aware to avoid tub baths or intercourse for the next 48 hours

## 2023-10-30 ENCOUNTER — OFFICE VISIT (OUTPATIENT)
Dept: URGENT CARE | Age: 58
End: 2023-10-30
Payer: COMMERCIAL

## 2023-10-30 VITALS
TEMPERATURE: 97.8 F | RESPIRATION RATE: 18 BRPM | OXYGEN SATURATION: 99 % | SYSTOLIC BLOOD PRESSURE: 124 MMHG | DIASTOLIC BLOOD PRESSURE: 72 MMHG | HEART RATE: 80 BPM

## 2023-10-30 DIAGNOSIS — R09.82 PND (POST-NASAL DRIP): Primary | ICD-10-CM

## 2023-10-30 PROCEDURE — 99213 OFFICE O/P EST LOW 20 MIN: CPT

## 2023-10-30 NOTE — PROGRESS NOTES
[facility]        NAME: Beny Villatoro is a 63 y.o. female  : 1965    MRN: [id number]  DATE: 2023  TIME: 6:06 PM    Assessment and Plan   PND (post-nasal drip) [R09.82]  1. PND (post-nasal drip)          Pt presents for eval of sore throat, PND. Cough. Denies fevers. Only took motrin. No wheezing/SOB. Assessment notes clear BS, no swelling, exudate or erythema. Discussed symptom management. Patient Instructions       Follow up with PCP as needed  Chief Complaint     Chief Complaint   Patient presents with    Nasal Congestion     Congestion, dry cough, sore throat, chest tightness. Sx for 5 days. Taking IBU. History of Present Illness       Pt presents for eval of sore throat, PND. Cough. Denies fevers. Only took motrin. No wheezing/SOB. Assessment notes clear BS, no swelling, exudate or erythema. Discussed symptom management. Review of Systems   Review of Systems   Constitutional:  Negative for activity change, appetite change and fever. HENT:  Positive for congestion, postnasal drip, rhinorrhea and sore throat. Respiratory:  Positive for cough and shortness of breath. Negative for wheezing. All other systems reviewed and are negative.         Current Medications       Current Outpatient Medications:     calcium-vitamin D (OSCAL) 250-125 MG-UNIT per tablet, Take 1 tablet by mouth daily, Disp: , Rfl:     ciclopirox (LOPROX) 0.77 % cream, Apply topically 2 (two) times a day, Disp: 30 g, Rfl: 0    glucosamine-chondroitin 500-400 MG tablet, Take 1 tablet by mouth 3 (three) times a day, Disp: , Rfl:     ibuprofen (MOTRIN) 200 mg tablet, Take 200 mg by mouth every 6 (six) hours as needed for mild pain, Disp: , Rfl:     Multiple Vitamin (multivitamin) tablet, Take 1 tablet by mouth daily, Disp: , Rfl:     nystatin-triamcinolone (MYCOLOG-II) ointment, Apply topically 2 (two) times a day, Disp: 30 g, Rfl: 0    Omega-3 Fatty Acids (FISH OIL PO), Take by mouth in the morning, Disp: , Rfl:     Turmeric 500 MG CAPS, Take by mouth, Disp: , Rfl:     albuterol (PROVENTIL HFA,VENTOLIN HFA) 90 mcg/act inhaler, Inhale 2 puffs every 6 (six) hours as needed for wheezing (Patient not taking: Reported on 4/17/2023), Disp: 18 g, Rfl: 0    bisacodyl (DULCOLAX) 5 mg EC tablet, Take as directed by GI Office. (Patient not taking: Reported on 9/25/2023), Disp: 2 tablet, Rfl: 0    Current Allergies     Allergies as of 10/30/2023 - Reviewed 10/30/2023   Allergen Reaction Noted    Bee venom Anaphylaxis 03/05/2023    Medical tape Itching and Rash 03/17/2020    Penicillins Rash 03/23/2015            The following portions of the patient's history were reviewed and updated as appropriate: allergies, current medications, past family history, past medical history, past social history, past surgical history and problem list.     Past Medical History:   Diagnosis Date    Arthritis     Hyperlipidemia     Raynaud disease        Past Surgical History:   Procedure Laterality Date    BUNIONECTOMY Left     EYE SURGERY Bilateral     laser surgery, to recorrect vision    FINGER FRACTURE SURGERY Right 1982    TONSILLECTOMY AND ADENOIDECTOMY         Family History   Adopted: Yes   Problem Relation Age of Onset    Pulmonary embolism Son          Medications have been verified. Objective   /72   Pulse 80   Temp 97.8 °F (36.6 °C) (Tympanic)   Resp 18   SpO2 99%   No LMP recorded. Patient has had an implant. Physical Exam     Physical Exam  Vitals reviewed. Constitutional:       Appearance: Normal appearance. HENT:      Right Ear: Tympanic membrane normal.      Left Ear: Tympanic membrane normal.      Nose: Congestion and rhinorrhea present. Mouth/Throat:      Pharynx: No posterior oropharyngeal erythema. Cardiovascular:      Rate and Rhythm: Normal rate and regular rhythm. Pulses: Normal pulses. Heart sounds: Normal heart sounds.    Pulmonary:      Effort: Pulmonary effort is normal.      Breath sounds: Normal breath sounds. Lymphadenopathy:      Cervical: No cervical adenopathy. Neurological:      Mental Status: She is alert.

## 2024-02-01 ENCOUNTER — OFFICE VISIT (OUTPATIENT)
Dept: URGENT CARE | Age: 59
End: 2024-02-01
Payer: COMMERCIAL

## 2024-02-01 VITALS — RESPIRATION RATE: 18 BRPM | OXYGEN SATURATION: 99 % | HEART RATE: 78 BPM | TEMPERATURE: 98.2 F

## 2024-02-01 DIAGNOSIS — J06.9 ACUTE URI: Primary | ICD-10-CM

## 2024-02-01 DIAGNOSIS — R05.1 ACUTE COUGH: ICD-10-CM

## 2024-02-01 LAB
SARS-COV-2 AG UPPER RESP QL IA: NEGATIVE
VALID CONTROL: NORMAL

## 2024-02-01 PROCEDURE — 99213 OFFICE O/P EST LOW 20 MIN: CPT

## 2024-02-01 PROCEDURE — 87811 SARS-COV-2 COVID19 W/OPTIC: CPT

## 2024-02-01 NOTE — LETTER
February 1, 2024     Patient: Kavya Stevens   YOB: 1965   Date of Visit: 2/1/2024       To Whom it May Concern:    Kavya Stevens was seen in my clinic on 2/1/2024. She has been medically cleared as of 02/01/2024    If you have any questions or concerns, please don't hesitate to call.         Sincerely,          CONSTANCE Zabala        CC: No Recipients

## 2024-02-01 NOTE — PROGRESS NOTES
[facility]        NAME: Kavya Stevens is a 58 y.o. female  : 1965    MRN: [id number]  DATE: 2024  TIME: 1:51 PM    Assessment and Plan   Acute cough [R05.1]  1. Acute cough  Poct Covid 19 Rapid Antigen Test        URI symptoms for 4 days. No fevers. COVID neg. No wheezing/SOB. Discussed symptom management.     Patient Instructions       Follow up with PCP in 3-5 days.  Proceed to  ER if symptoms worsen.    Chief Complaint     Chief Complaint   Patient presents with    Cold Like Symptoms         History of Present Illness       URI symptoms for 4 days. No fevers. COVID neg. No wheezing/SOB. Discussed symptom management.         Review of Systems   Review of Systems   Constitutional:  Negative for activity change, appetite change and fever.   HENT:  Positive for congestion and postnasal drip.    Respiratory:  Positive for cough. Negative for shortness of breath and wheezing.    All other systems reviewed and are negative.        Current Medications       Current Outpatient Medications:     calcium-vitamin D (OSCAL) 250-125 MG-UNIT per tablet, Take 1 tablet by mouth daily, Disp: , Rfl:     ciclopirox (LOPROX) 0.77 % cream, Apply topically 2 (two) times a day, Disp: 30 g, Rfl: 0    glucosamine-chondroitin 500-400 MG tablet, Take 1 tablet by mouth 3 (three) times a day, Disp: , Rfl:     ibuprofen (MOTRIN) 200 mg tablet, Take 200 mg by mouth every 6 (six) hours as needed for mild pain, Disp: , Rfl:     Multiple Vitamin (multivitamin) tablet, Take 1 tablet by mouth daily, Disp: , Rfl:     nystatin-triamcinolone (MYCOLOG-II) ointment, Apply topically 2 (two) times a day, Disp: 30 g, Rfl: 0    Omega-3 Fatty Acids (FISH OIL PO), Take by mouth in the morning, Disp: , Rfl:     Turmeric 500 MG CAPS, Take by mouth, Disp: , Rfl:     albuterol (PROVENTIL HFA,VENTOLIN HFA) 90 mcg/act inhaler, Inhale 2 puffs every 6 (six) hours as needed for wheezing (Patient not taking: Reported on 2023), Disp: 18  g, Rfl: 0    bisacodyl (DULCOLAX) 5 mg EC tablet, Take as directed by GI Office. (Patient not taking: Reported on 9/25/2023), Disp: 2 tablet, Rfl: 0    Current Allergies     Allergies as of 02/01/2024 - Reviewed 02/01/2024   Allergen Reaction Noted    Bee venom Anaphylaxis 03/05/2023    Medical tape Itching and Rash 03/17/2020    Penicillins Rash 03/23/2015            The following portions of the patient's history were reviewed and updated as appropriate: allergies, current medications, past family history, past medical history, past social history, past surgical history and problem list.     Past Medical History:   Diagnosis Date    Arthritis     Hyperlipidemia     Raynaud disease        Past Surgical History:   Procedure Laterality Date    BUNIONECTOMY Left     EYE SURGERY Bilateral     laser surgery, to recorrect vision    FINGER FRACTURE SURGERY Right 1982    TONSILLECTOMY AND ADENOIDECTOMY         Family History   Adopted: Yes   Problem Relation Age of Onset    Pulmonary embolism Son          Medications have been verified.        Objective   Pulse 78   Temp 98.2 °F (36.8 °C)   Resp 18   SpO2 99%   No LMP recorded. Patient has had an implant.       Physical Exam     Physical Exam  Vitals reviewed.   Constitutional:       Appearance: Normal appearance.   HENT:      Right Ear: Tympanic membrane normal.      Left Ear: Tympanic membrane normal.      Nose: Congestion and rhinorrhea present.   Cardiovascular:      Rate and Rhythm: Normal rate and regular rhythm.      Pulses: Normal pulses.      Heart sounds: Normal heart sounds.   Pulmonary:      Effort: Pulmonary effort is normal.      Breath sounds: Normal breath sounds.   Lymphadenopathy:      Cervical: No cervical adenopathy.   Neurological:      Mental Status: She is alert.

## 2024-03-13 ENCOUNTER — OFFICE VISIT (OUTPATIENT)
Dept: BARIATRICS | Facility: CLINIC | Age: 59
End: 2024-03-13
Payer: COMMERCIAL

## 2024-03-13 ENCOUNTER — APPOINTMENT (OUTPATIENT)
Dept: LAB | Facility: HOSPITAL | Age: 59
End: 2024-03-13
Payer: COMMERCIAL

## 2024-03-13 ENCOUNTER — TELEPHONE (OUTPATIENT)
Age: 59
End: 2024-03-13

## 2024-03-13 ENCOUNTER — APPOINTMENT (OUTPATIENT)
Dept: LAB | Facility: MEDICAL CENTER | Age: 59
End: 2024-03-13
Payer: COMMERCIAL

## 2024-03-13 VITALS
HEART RATE: 72 BPM | RESPIRATION RATE: 16 BRPM | TEMPERATURE: 98.8 F | WEIGHT: 244.4 LBS | DIASTOLIC BLOOD PRESSURE: 78 MMHG | BODY MASS INDEX: 40.72 KG/M2 | HEIGHT: 65 IN | SYSTOLIC BLOOD PRESSURE: 114 MMHG

## 2024-03-13 DIAGNOSIS — Z78.9 KETOGENIC DIET: ICD-10-CM

## 2024-03-13 DIAGNOSIS — E66.9 OBESITY, CLASS II, BMI 35-39.9: ICD-10-CM

## 2024-03-13 DIAGNOSIS — E66.9 OBESITY, CLASS II, BMI 35-39.9: Primary | ICD-10-CM

## 2024-03-13 LAB
ALBUMIN SERPL BCP-MCNC: 4 G/DL (ref 3.5–5)
ALP SERPL-CCNC: 68 U/L (ref 34–104)
ALT SERPL W P-5'-P-CCNC: 17 U/L (ref 7–52)
ANION GAP SERPL CALCULATED.3IONS-SCNC: 11 MMOL/L (ref 4–13)
AST SERPL W P-5'-P-CCNC: 25 U/L (ref 13–39)
ATRIAL RATE: 56 BPM
BASOPHILS # BLD AUTO: 0.05 THOUSANDS/ÂΜL (ref 0–0.1)
BASOPHILS NFR BLD AUTO: 1 % (ref 0–1)
BILIRUB SERPL-MCNC: 0.38 MG/DL (ref 0.2–1)
BUN SERPL-MCNC: 22 MG/DL (ref 5–25)
CALCIUM SERPL-MCNC: 9.4 MG/DL (ref 8.4–10.2)
CHLORIDE SERPL-SCNC: 107 MMOL/L (ref 96–108)
CO2 SERPL-SCNC: 25 MMOL/L (ref 21–32)
CREAT SERPL-MCNC: 0.85 MG/DL (ref 0.6–1.3)
EOSINOPHIL # BLD AUTO: 0.18 THOUSAND/ÂΜL (ref 0–0.61)
EOSINOPHIL NFR BLD AUTO: 2 % (ref 0–6)
ERYTHROCYTE [DISTWIDTH] IN BLOOD BY AUTOMATED COUNT: 12.4 % (ref 11.6–15.1)
GFR SERPL CREATININE-BSD FRML MDRD: 76 ML/MIN/1.73SQ M
GLUCOSE P FAST SERPL-MCNC: 73 MG/DL (ref 65–99)
HCT VFR BLD AUTO: 41.2 % (ref 34.8–46.1)
HGB BLD-MCNC: 13.4 G/DL (ref 11.5–15.4)
IMM GRANULOCYTES # BLD AUTO: 0.03 THOUSAND/UL (ref 0–0.2)
IMM GRANULOCYTES NFR BLD AUTO: 0 % (ref 0–2)
LYMPHOCYTES # BLD AUTO: 1.9 THOUSANDS/ÂΜL (ref 0.6–4.47)
LYMPHOCYTES NFR BLD AUTO: 26 % (ref 14–44)
MAGNESIUM SERPL-MCNC: 2.1 MG/DL (ref 1.9–2.7)
MCH RBC QN AUTO: 31.2 PG (ref 26.8–34.3)
MCHC RBC AUTO-ENTMCNC: 32.5 G/DL (ref 31.4–37.4)
MCV RBC AUTO: 96 FL (ref 82–98)
MONOCYTES # BLD AUTO: 0.75 THOUSAND/ÂΜL (ref 0.17–1.22)
MONOCYTES NFR BLD AUTO: 10 % (ref 4–12)
NEUTROPHILS # BLD AUTO: 4.44 THOUSANDS/ÂΜL (ref 1.85–7.62)
NEUTS SEG NFR BLD AUTO: 61 % (ref 43–75)
NRBC BLD AUTO-RTO: 0 /100 WBCS
P AXIS: 48 DEGREES
PLATELET # BLD AUTO: 233 THOUSANDS/UL (ref 149–390)
PMV BLD AUTO: 10.4 FL (ref 8.9–12.7)
POTASSIUM SERPL-SCNC: 4.4 MMOL/L (ref 3.5–5.3)
PR INTERVAL: 160 MS
PROT SERPL-MCNC: 6.6 G/DL (ref 6.4–8.4)
QRS AXIS: 25 DEGREES
QRSD INTERVAL: 76 MS
QT INTERVAL: 396 MS
QTC INTERVAL: 382 MS
RBC # BLD AUTO: 4.29 MILLION/UL (ref 3.81–5.12)
SODIUM SERPL-SCNC: 143 MMOL/L (ref 135–147)
T WAVE AXIS: 34 DEGREES
TSH SERPL DL<=0.05 MIU/L-ACNC: 1.27 UIU/ML (ref 0.45–4.5)
URATE SERPL-MCNC: 5.3 MG/DL (ref 2–7.5)
VENTRICULAR RATE: 56 BPM
WBC # BLD AUTO: 7.35 THOUSAND/UL (ref 4.31–10.16)

## 2024-03-13 PROCEDURE — 80053 COMPREHEN METABOLIC PANEL: CPT

## 2024-03-13 PROCEDURE — 99213 OFFICE O/P EST LOW 20 MIN: CPT | Performed by: PHYSICIAN ASSISTANT

## 2024-03-13 PROCEDURE — 85025 COMPLETE CBC W/AUTO DIFF WBC: CPT

## 2024-03-13 PROCEDURE — 84550 ASSAY OF BLOOD/URIC ACID: CPT

## 2024-03-13 PROCEDURE — 36415 COLL VENOUS BLD VENIPUNCTURE: CPT

## 2024-03-13 PROCEDURE — 84443 ASSAY THYROID STIM HORMONE: CPT

## 2024-03-13 PROCEDURE — 83735 ASSAY OF MAGNESIUM: CPT

## 2024-03-13 NOTE — TELEPHONE ENCOUNTER
Patient calling in to let us know she complete her labs and ECG and just asking for a call back when results are complete

## 2024-03-13 NOTE — ASSESSMENT & PLAN NOTE
-Patient would like to pursue VLCD  -Initial weight loss goal of 5-10% weight loss for improved health    VLCD Review:  Contraindications: no  Labs ordered: yes  EKG ordered: yes  HTN meds addressed: n/a  DM2 meds addressed: n/a  VLCD time restriction based on BMI: no  LMP/OCP: menopausal    Discussed VLCD diet plan, fluid goals, exercise limitations.  Discussed following up in office bi-weekly and having monthly labs performed.   Discussed having a followup plan after the program and could consider medication if needed at that time

## 2024-03-13 NOTE — PROGRESS NOTES
Assessment/Plan:    Obesity, Class II, BMI 35-39.9  -Patient would like to pursue VLCD  -Initial weight loss goal of 5-10% weight loss for improved health    VLCD Review:  Contraindications: no  Labs ordered: yes  EKG ordered: yes  HTN meds addressed: n/a  DM2 meds addressed: n/a  VLCD time restriction based on BMI: no  LMP/OCP: menopausal    Discussed VLCD diet plan, fluid goals, exercise limitations.  Discussed following up in office bi-weekly and having monthly labs performed.   Discussed having a followup plan after the program and could consider medication if needed at that time        Follow up in approximately 1 month with Non-Surgical Dietician.     Diagnoses and all orders for this visit:    Obesity, Class II, BMI 35-39.9  -     CBC and differential; Future  -     Comprehensive metabolic panel; Future  -     TSH, 3rd generation; Future  -     Magnesium; Future  -     Uric acid; Future  -     ECG 12 lead; Future    Ketogenic diet  -     CBC and differential; Future  -     Comprehensive metabolic panel; Future  -     TSH, 3rd generation; Future  -     Magnesium; Future  -     Uric acid; Future  -     ECG 12 lead; Future          Subjective:   Chief Complaint   Patient presents with    Follow-up     MWM- OD f/u, GW 150lb        Patient ID: Kavya Stevens  is a 58 y.o. female with excess weight/obesity here to pursue weight managment.  Patient did 1 month of healthycore prior.     HPI  Last seen in 2022 and did healthycore 4 weeks. Has gained weight since. She had IUD taken out in September and gained 20lb.  Arthritis has got worse. She maybe needing ankle replacember    Lost weight prior to marriage but was eating a min. Amount of food and meal replacements    She is an emotional eater and eats out of boredom at night  Wt Readings from Last 10 Encounters:   03/13/24 111 kg (244 lb 6.4 oz)   09/25/23 107 kg (235 lb)   06/27/23 112 kg (246 lb 14.6 oz)   06/12/23 112 kg (247 lb)   05/24/23 112 kg (247 lb)  "  05/10/23 112 kg (247 lb 6.4 oz)   04/17/23 111 kg (244 lb)   03/05/23 102 kg (225 lb)   07/14/22 101 kg (221 lb 11.2 oz)   07/07/22 101 kg (223 lb 3.2 oz)       Exercise:nothing formal  Hydration:64 oz water, 22 oz coffee w/ half and half, brewed tea       The following portions of the patient's history were reviewed and updated as appropriate: allergies, current medications, past family history, past medical history, past social history, past surgical history, and problem list.    Review of Systems   Constitutional:  Positive for fatigue. Negative for fever.   Respiratory:  Negative for shortness of breath.    Cardiovascular:  Negative for chest pain and palpitations.   Gastrointestinal:  Negative for abdominal pain, constipation, diarrhea and vomiting.   Endocrine:        Hair loss   Genitourinary:  Negative for difficulty urinating.   Skin:  Negative for rash.   Neurological:  Negative for headaches.   Psychiatric/Behavioral:  Negative for dysphoric mood. The patient is not nervous/anxious.        Objective:    /78   Pulse 72   Temp 98.8 °F (37.1 °C)   Resp 16   Ht 5' 5\" (1.651 m)   Wt 111 kg (244 lb 6.4 oz)   BMI 40.67 kg/m²      Physical Exam  Vitals and nursing note reviewed.   Constitutional:       General: She is not in acute distress.     Appearance: She is well-developed. She is obese.   HENT:      Head: Normocephalic and atraumatic.   Eyes:      Conjunctiva/sclera: Conjunctivae normal.   Neck:      Thyroid: No thyromegaly.   Pulmonary:      Effort: Pulmonary effort is normal. No respiratory distress.   Skin:     Findings: No rash (visible).   Neurological:      Mental Status: She is alert and oriented to person, place, and time.   Psychiatric:         Mood and Affect: Mood normal.         Behavior: Behavior normal.         "

## 2024-03-15 ENCOUNTER — TELEPHONE (OUTPATIENT)
Age: 59
End: 2024-03-15

## 2024-03-15 NOTE — TELEPHONE ENCOUNTER
Appointment scheduled with provider.    Reason: New Patient    Symptoms: Establish Care    Provider: CONSTANCE Aguiar    Date/Time: Wednesday 3/27/2024 at 5:20 pm

## 2024-03-27 ENCOUNTER — OFFICE VISIT (OUTPATIENT)
Dept: FAMILY MEDICINE CLINIC | Facility: CLINIC | Age: 59
End: 2024-03-27
Payer: COMMERCIAL

## 2024-03-27 VITALS
HEIGHT: 65 IN | WEIGHT: 254 LBS | OXYGEN SATURATION: 98 % | TEMPERATURE: 97.6 F | SYSTOLIC BLOOD PRESSURE: 122 MMHG | DIASTOLIC BLOOD PRESSURE: 80 MMHG | HEART RATE: 64 BPM | BODY MASS INDEX: 42.32 KG/M2 | RESPIRATION RATE: 16 BRPM

## 2024-03-27 DIAGNOSIS — Z13.1 DIABETES MELLITUS SCREENING: ICD-10-CM

## 2024-03-27 DIAGNOSIS — E78.5 HYPERLIPIDEMIA, UNSPECIFIED HYPERLIPIDEMIA TYPE: ICD-10-CM

## 2024-03-27 DIAGNOSIS — M67.431 GANGLION CYST OF WRIST, RIGHT: Primary | ICD-10-CM

## 2024-03-27 DIAGNOSIS — E66.01 CLASS 3 SEVERE OBESITY DUE TO EXCESS CALORIES WITHOUT SERIOUS COMORBIDITY WITH BODY MASS INDEX (BMI) OF 40.0 TO 44.9 IN ADULT (HCC): ICD-10-CM

## 2024-03-27 PROBLEM — E66.813 CLASS 3 SEVERE OBESITY DUE TO EXCESS CALORIES WITHOUT SERIOUS COMORBIDITY WITH BODY MASS INDEX (BMI) OF 40.0 TO 44.9 IN ADULT (HCC): Status: ACTIVE | Noted: 2024-03-27

## 2024-03-27 PROCEDURE — 99204 OFFICE O/P NEW MOD 45 MIN: CPT | Performed by: NURSE PRACTITIONER

## 2024-03-27 NOTE — ASSESSMENT & PLAN NOTE
- She has appointment with Weight Management on 4/3.   - Encourage healthy diet and regular exercise.   - Will continue to follow up.

## 2024-03-27 NOTE — ASSESSMENT & PLAN NOTE
- LDL slightly elevated but rest of lipid panel stable.  - Encourage healthy diet and regular exercise.  - Will continue to monitor.

## 2024-03-27 NOTE — PROGRESS NOTES
Name: Kavya Stevens      : 1965      MRN: [id number]  Encounter Provider: CONSTANCE Aguiar  Encounter Date: 3/27/2024   Encounter department: [facility]    Assessment & Plan     1. Ganglion cyst of wrist, right  Assessment & Plan:  - Referred to Orthopedic Hand Surgeon.     Orders:  -     Ambulatory Referral to Orthopedic Surgery; Future    2. Hyperlipidemia, unspecified hyperlipidemia type  Assessment & Plan:  - LDL slightly elevated but rest of lipid panel stable.  - Encourage healthy diet and regular exercise.  - Will continue to monitor.    Orders:  -     Lipid Panel with Direct LDL reflex; Future    3. Class 3 severe obesity due to excess calories without serious comorbidity with body mass index (BMI) of 40.0 to 44.9 in adult (HCC)  Assessment & Plan:  - She has appointment with Weight Management on 4/3.   - Encourage healthy diet and regular exercise.   - Will continue to follow up.       4. Diabetes mellitus screening  -     Hemoglobin A1C; Future           Subjective     Patient presents to office today to establish care. She has a PMH of obesity and arthritis. She is seeing weight management and has an appointment 4/3. She does have bilateral knee osteoarthritis. Is hoping she can hold off on knee replacement if she loses some weight. She had normal CBC, CMP, and TSH this month. Also complains of increased joint pain and hair loss after her Mirena was taken out. She does follow with a Gynecologist routinely. Also has complaints of ganglion cyst on her right wrist that is becoming bothersome. Has been present for over 1 year. Is very tender if she bumps is. She denies any other concerns or complaints today.          Review of Systems   Constitutional:  Negative for fatigue and fever.   HENT:  Negative for trouble swallowing.    Eyes:  Negative for visual disturbance.   Respiratory:  Negative for cough and shortness of breath.    Cardiovascular:  Negative for chest  pain and palpitations.   Gastrointestinal:  Negative for abdominal pain and blood in stool.   Endocrine: Negative for cold intolerance and heat intolerance.   Genitourinary:  Negative for difficulty urinating and dysuria.   Musculoskeletal:  Positive for arthralgias. Negative for gait problem.   Skin:  Negative for rash.   Neurological:  Negative for dizziness, syncope and headaches.   Hematological:  Negative for adenopathy.   Psychiatric/Behavioral:  Negative for behavioral problems.        Past Medical History:   Diagnosis Date   • Arthritis    • Hyperlipidemia    • Raynaud disease      Past Surgical History:   Procedure Laterality Date   • BUNIONECTOMY Left    • EYE SURGERY Bilateral     laser surgery, to recorrect vision   • FINGER FRACTURE SURGERY Right 1982   • TONSILLECTOMY AND ADENOIDECTOMY       Family History   Adopted: Yes   Problem Relation Age of Onset   • Pulmonary embolism Son      Social History     Socioeconomic History   • Marital status:      Spouse name: None   • Number of children: None   • Years of education: None   • Highest education level: None   Occupational History   • None   Tobacco Use   • Smoking status: Never   • Smokeless tobacco: Never   Vaping Use   • Vaping status: Never Used   Substance and Sexual Activity   • Alcohol use: Yes     Alcohol/week: 4.0 standard drinks of alcohol     Types: 4 Glasses of wine per week     Comment: Usually socially   • Drug use: Never   • Sexual activity: Not Currently     Partners: Male     Birth control/protection: I.U.D.   Other Topics Concern   • None   Social History Narrative   • None     Social Determinants of Health     Financial Resource Strain: Low Risk  (6/22/2023)    Received from [facility]    Overall Financial Resource Strain (CARDIA)    • Difficulty of Paying Living Expenses: Not very hard   Food Insecurity: No Food Insecurity (6/22/2023)    Received from [facility]    Hunger Vital Sign    •  Worried About Running Out of Food in the Last Year: Never true    • Ran Out of Food in the Last Year: Never true   Transportation Needs: No Transportation Needs (6/22/2023)    Received from [facility]    PRACARLOSE - Transportation    • Lack of Transportation (Medical): No    • Lack of Transportation (Non-Medical): No   Physical Activity: Not on file   Stress: No Stress Concern Present (6/22/2023)    Received from [facility]    [employer] - Occupational Stress Questionnaire    • Feeling of Stress : Not at all   Social Connections: Moderately Isolated (6/22/2023)    Received from [facility]    Social Connection and Isolation Panel [NHANES]    • Frequency of Communication with Friends and Family: More than three times a week    • Frequency of Social Gatherings with Friends and Family: Once a week    • Attends Scientologist Services: Never    • Active Member of Clubs or Organizations: Yes    • Attends Club or Organization Meetings: 1 to 4 times per year    • Marital Status:    Intimate Partner Violence: Not At Risk (6/22/2023)    Received from [facility]    Humiliation, Afraid, Rape, and Kick questionnaire    • Fear of Current or Ex-Partner: No    • Emotionally Abused: No    • Physically Abused: No    • Sexually Abused: No   Housing Stability: Low Risk  (6/22/2023)    Received from [facility]    Housing Stability Vital Sign    • Unable to Pay for Housing in the Last Year: No    • Number of Places Lived in the Last Year: 1    • Unstable Housing in the Last Year: No     Current Outpatient Medications on File Prior to Visit   Medication Sig   • calcium-vitamin D (OSCAL) 250-125 MG-UNIT per tablet Take 1 tablet by mouth daily   • ciclopirox (LOPROX) 0.77 % cream Apply topically 2 (two) times a day   • glucosamine-chondroitin 500-400 MG tablet Take 1 tablet by mouth 3 (three) times a day   • ibuprofen  "(MOTRIN) 200 mg tablet Take 200 mg by mouth every 6 (six) hours as needed for mild pain   • Multiple Vitamin (multivitamin) tablet Take 1 tablet by mouth daily   • nystatin-triamcinolone (MYCOLOG-II) ointment Apply topically 2 (two) times a day   • Omega-3 Fatty Acids (FISH OIL PO) Take by mouth in the morning   • Turmeric 500 MG CAPS Take by mouth   • albuterol (PROVENTIL HFA,VENTOLIN HFA) 90 mcg/act inhaler Inhale 2 puffs every 6 (six) hours as needed for wheezing (Patient taking differently: Inhale as needed for wheezing)     Allergies   Allergen Reactions   • Bee Venom Anaphylaxis   • Medical Tape Itching and Rash     Holter and Telemetry patches   • Penicillins Rash     Immunization History   Administered Date(s) Administered   • COVID-19 MODERNA VACC 0.5 ML IM 03/17/2021, 04/14/2021, 11/04/2021   • COVID-19 Moderna mRNA Vaccine 12 Yr+ 50 mcg/0.5 mL (Spikevax) 12/20/2023   • COVID-19 PFIZER VACCINE 0.3 ML IM 10/14/2022   • COVID-19 Pfizer Vac BIVALENT Jim-sucrose 12 Yr+ IM 10/14/2022   • INFLUENZA 10/03/2011, 03/10/2014, 10/12/2016, 09/26/2019, 10/29/2020, 11/22/2021, 10/14/2022   • Influenza Quadrivalent Preservative Free 3 years and older IM 09/26/2019, 10/29/2020   • Tdap 03/29/2019   • Tuberculin Skin Test-PPD Intradermal 09/23/2021   • Zoster Vaccine Recombinant 10/30/2020, 05/01/2021       Objective     /80 (BP Location: Left arm, Patient Position: Sitting, Cuff Size: Large)   Pulse 64   Temp 97.6 °F (36.4 °C) (Tympanic)   Resp 16   Ht 5' 5\" (1.651 m)   Wt 115 kg (254 lb)   SpO2 98%   BMI 42.27 kg/m²     Physical Exam  Vitals and nursing note reviewed.   Constitutional:       General: She is not in acute distress.     Appearance: Normal appearance. She is not ill-appearing.   HENT:      Head: Normocephalic and atraumatic.      Right Ear: Tympanic membrane, ear canal and external ear normal.      Left Ear: Tympanic membrane, ear canal and external ear normal.      Nose: Nose normal.   Eyes: "      Conjunctiva/sclera: Conjunctivae normal.   Cardiovascular:      Rate and Rhythm: Normal rate and regular rhythm.      Heart sounds: Normal heart sounds.   Pulmonary:      Effort: Pulmonary effort is normal.      Breath sounds: Normal breath sounds.   Musculoskeletal:         General: Normal range of motion.      Cervical back: Normal range of motion.      Comments: Ganglion cyst to dorsum of right wrist. Tender to touch.    Skin:     General: Skin is warm and dry.   Neurological:      Mental Status: She is alert and oriented to person, place, and time.   Psychiatric:         Mood and Affect: Mood normal.         Behavior: Behavior normal.       CONSTANCE Aguiar

## 2024-03-29 NOTE — PROGRESS NOTES
Initial RD session for VLCD completed. Components of diet discussed including ketosis, hydration, possible side effects. 2 weeks of product ordered and received. Will f/u 4/10 via email.

## 2024-04-03 ENCOUNTER — CLINICAL SUPPORT (OUTPATIENT)
Dept: BARIATRICS | Facility: CLINIC | Age: 59
End: 2024-04-03
Payer: COMMERCIAL

## 2024-04-03 VITALS — HEIGHT: 65 IN | WEIGHT: 243.5 LBS | BODY MASS INDEX: 40.57 KG/M2

## 2024-04-03 DIAGNOSIS — E66.01 CLASS 3 SEVERE OBESITY DUE TO EXCESS CALORIES WITHOUT SERIOUS COMORBIDITY WITH BODY MASS INDEX (BMI) OF 40.0 TO 44.9 IN ADULT (HCC): Primary | ICD-10-CM

## 2024-04-03 PROCEDURE — S9470 NUTRITIONAL COUNSELING, DIET: HCPCS

## 2024-04-03 PROCEDURE — RECHECK

## 2024-04-10 ENCOUNTER — TELEPHONE (OUTPATIENT)
Dept: BARIATRICS | Facility: CLINIC | Age: 59
End: 2024-04-10

## 2024-04-10 NOTE — TELEPHONE ENCOUNTER
Email received from Kavya. No issues tolerating VLCD, hydration adequate. She will reach out if needed prior to next visit.

## 2024-04-18 NOTE — PROGRESS NOTES
"Weight Management Medical Nutrition Assessment   Is here for VLCD f/u. Current wt: 234.5 lbs. Loss of 9 lbs x 2 weeks.   Hydration adequate. Reports constipation, moving bowels every 3-4 days. Using colace, 2 fiber gummies, dulcolax as needed. Recommend increase to 100 oz water and add 2 fiber gummies. Using 2 low carb snacks daily. Will need to use emergency meal next weekend, plan discussed. Will continue VLCD at this time.    Patient seen by Medical Provider in past 6 months:  yes  Requested to schedule appointment with Medical Provider: No      Anthropometric Measurements  Start Weight (#): 243.5 lbs 4/3/24  Current Weight (#): 234.5 lbs   TBW % Change from start weight:3.7%  Ideal Body Weight (#):125 lbs 65\" (BMI 25   Goal Weight (#):150 lbs  Highest: not addressed  Lowest: not addressed     Weight Loss History  Previous weight loss attempts: Nutrition Counseling with RD    Food and Nutrition Related History  Wake up: 5:30   Bed Time:9:30    Food Recall  Breakfast:7:00 replacement     Snack:10:00 egg  Lunch:12:30 replacement   Snack:3:00 cheese stick  Dinner:6:30 replacement   Snack:bar       Beverages: water and coffee   Volume of beverage intake: 80 oz    Weekends: Same  Cravings: not identified   Trouble area of day:not identified     Frequency of Eating out: none currently   Food restrictions:carbs <50 gm while on VLCD  Cooking: self   Food Shopping: self    Physical Activity Intake  Activity:n/a  Frequency: n/a  Physical limitations/barriers to exercise: no exercise while on VLCD    Estimated Needs  Energy  SECA: BMR:n/a      X 1.3 -1000 =  [place]  [employer] Needs: BMR : 1650   1-2# loss weekly sedentary:  979-1479             1-2# loss weekly lightly active:7469-3653  Maintenance calories for sedentary activity level: 1979  Protein:68-85 gm      (1.2-1.5g/kg IBW)  Fluid: 66 oz     (35mL/kg IBW)    Nutrition Diagnosis  Yes;    Overweight/obesity  related to Excess energy intake as evidenced by  BMI " more than normative standard for age and sex (obesity-grade II 35-39.9)       Nutrition Intervention    Nutrition Prescription  Calories:760  Protein:96 gm  Fluid:80 oz    Meal Plan (Umberto/Pro/Carb)  Breakfast: 200, 27, 10  Snack:  Lunch: 200, 27, 10  Snack:  Dinner: 200, 27, 10  Snack: 160, 15, 13    Nutrition Education:    VLCD  Hydration/constipation    Nutrition Counseling:  Strategies: as above     Monitoring and Evaluation:  Evaluation criteria:  Energy Intake  Meet protein needs  Maintain adequate hydration  Monitor weekly weight  Meal planning/preparation  Food journal   Decreased portions at mealtimes and snacks  Physical activity     Barriers to learning:none  Readiness to change: Action:  (Changing behavior)  Comprehension: very good  Expected Compliance: very good

## 2024-04-20 ENCOUNTER — APPOINTMENT (OUTPATIENT)
Dept: LAB | Age: 59
End: 2024-04-20
Payer: COMMERCIAL

## 2024-04-20 DIAGNOSIS — Z13.1 DIABETES MELLITUS SCREENING: ICD-10-CM

## 2024-04-20 DIAGNOSIS — Z00.8 HEALTH EXAMINATION IN POPULATION SURVEY: ICD-10-CM

## 2024-04-20 DIAGNOSIS — E78.5 HYPERLIPIDEMIA, UNSPECIFIED HYPERLIPIDEMIA TYPE: ICD-10-CM

## 2024-04-20 LAB
CHOLEST SERPL-MCNC: 229 MG/DL
EST. AVERAGE GLUCOSE BLD GHB EST-MCNC: 108 MG/DL
HBA1C MFR BLD: 5.4 %
HDLC SERPL-MCNC: 68 MG/DL
LDLC SERPL CALC-MCNC: 143 MG/DL (ref 0–100)
TRIGL SERPL-MCNC: 88 MG/DL

## 2024-04-20 PROCEDURE — 36415 COLL VENOUS BLD VENIPUNCTURE: CPT

## 2024-04-20 PROCEDURE — 80061 LIPID PANEL: CPT

## 2024-04-20 PROCEDURE — 83036 HEMOGLOBIN GLYCOSYLATED A1C: CPT

## 2024-04-22 ENCOUNTER — CLINICAL SUPPORT (OUTPATIENT)
Dept: BARIATRICS | Facility: CLINIC | Age: 59
End: 2024-04-22
Payer: COMMERCIAL

## 2024-04-22 VITALS
BODY MASS INDEX: 39.07 KG/M2 | DIASTOLIC BLOOD PRESSURE: 80 MMHG | HEIGHT: 65 IN | SYSTOLIC BLOOD PRESSURE: 125 MMHG | WEIGHT: 234.5 LBS

## 2024-04-22 DIAGNOSIS — Z71.3 ENCOUNTER FOR MONITORING OF KETOGENIC DIET: ICD-10-CM

## 2024-04-22 DIAGNOSIS — E66.09 CLASS 2 OBESITY DUE TO EXCESS CALORIES WITH BODY MASS INDEX (BMI) OF 39.0 TO 39.9 IN ADULT: ICD-10-CM

## 2024-04-22 DIAGNOSIS — E66.01 CLASS 2 SEVERE OBESITY DUE TO EXCESS CALORIES WITH SERIOUS COMORBIDITY AND BODY MASS INDEX (BMI) OF 39.0 TO 39.9 IN ADULT (HCC): ICD-10-CM

## 2024-04-22 DIAGNOSIS — R63.5 ABNORMAL WEIGHT GAIN: Primary | ICD-10-CM

## 2024-04-22 PROCEDURE — S9470 NUTRITIONAL COUNSELING, DIET: HCPCS

## 2024-04-22 PROCEDURE — RECHECK

## 2024-04-23 ENCOUNTER — OFFICE VISIT (OUTPATIENT)
Dept: OBGYN CLINIC | Facility: MEDICAL CENTER | Age: 59
End: 2024-04-23
Payer: COMMERCIAL

## 2024-04-23 VITALS
WEIGHT: 236 LBS | SYSTOLIC BLOOD PRESSURE: 118 MMHG | DIASTOLIC BLOOD PRESSURE: 76 MMHG | HEIGHT: 65 IN | HEART RATE: 59 BPM | BODY MASS INDEX: 39.32 KG/M2

## 2024-04-23 DIAGNOSIS — M67.431 GANGLION CYST OF WRIST, RIGHT: ICD-10-CM

## 2024-04-23 PROCEDURE — 99203 OFFICE O/P NEW LOW 30 MIN: CPT | Performed by: ORTHOPAEDIC SURGERY

## 2024-04-23 PROCEDURE — 20612 ASPIRATE/INJ GANGLION CYST: CPT | Performed by: ORTHOPAEDIC SURGERY

## 2024-04-23 RX ORDER — LIDOCAINE HYDROCHLORIDE 10 MG/ML
1 INJECTION, SOLUTION INFILTRATION; PERINEURAL
Status: COMPLETED | OUTPATIENT
Start: 2024-04-23 | End: 2024-04-23

## 2024-04-23 RX ADMIN — LIDOCAINE HYDROCHLORIDE 1 ML: 10 INJECTION, SOLUTION INFILTRATION; PERINEURAL at 17:00

## 2024-04-23 NOTE — PROGRESS NOTES
Hand/upper extremity injection: R wrist  Universal Protocol:  Consent: Verbal consent obtained.  Risks and benefits: risks, benefits and alternatives were discussed  Consent given by: patient  Patient identity confirmed: verbally with patient  Supporting Documentation  Indications: pain and therapeutic   Procedure Details  Condition:dorsal carpal ganglion Site: R wrist   Needle size: 18 G  Ultrasound guidance: no  Approach: dorsal  Medications administered: 1 mL lidocaine 1 %  Aspirate amount: 0.5 mL  Aspirate: clear  Patient tolerance: patient tolerated the procedure well with no immediate complications  Dressing:  Sterile dressing applied    Ganglion Aspiration without Injection Procedure Note    Pre-operative Diagnosis: right wrist dorsal ganglion    Post-operative Diagnosis: same    Indications: Diagnosis and treatment of symptomatic ganglion cyst    Anesthesia: 1 cc Lidocaine 1% without epinephrine without added sodium bicarbonate    Procedure Details     After a discussion of the risks and benefits with the patient (including the possibility that any manipulation of the ganglion could introduce infection, worsening the current situation significantly, high rate of recurrence), verbal consent was obtained for the procedure. The joint was prepped with Betadine and a small wheel of local anesthesia was injected into the subcutaneous tissue. An 18 gauge needle was introduced into the fluid collection and 0.5 ml of clear yellow fluid was removed and discarded. The injection site was cleansed with topical isopropyl alcohol and a dressing was applied.

## 2024-04-23 NOTE — PROGRESS NOTES
The HAND & UPPER EXTREMITY OFFICE VISIT   Referred By:  Yasmeen Barger, [place]  [address] [address]      Chief Complaint:     Right wrist mass    History of Present Illness:   58 y.o., right hand dominant female presents with greater than 1 year of right wrist pain with a right dorsal wrist mass which has been increasing in size becoming more painful over the last couple months.  States that it bothers her with finger and wrist extension and when pushing off to get up.  Bothering her at work with repetitive typing and other activities.  Denies numbness or tingling.  Relieved by rest.  When she bumps it is also very painful so wearing a wrist brace has been helpful to prevent her from hitting it.  Otherwise the cyst has not decreased in size with the use of the wrist brace.    Previously saw her PCP and was referred to hand surgery for subspecialty evaluation.  The PCP note was reviewed in detail today.  No other treatments attempted yet.        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       Past Medical History:  Past Medical History:   Diagnosis Date    Arthritis     Hyperlipidemia     Raynaud disease      Past Surgical History:   Procedure Laterality Date    BUNIONECTOMY Left     EYE SURGERY Bilateral     laser surgery, to recorrect vision    FINGER FRACTURE SURGERY Right 1982    TONSILLECTOMY AND ADENOIDECTOMY       Family History   Adopted: Yes   Problem Relation Age of Onset    Pulmonary embolism Son      Social History     Socioeconomic History    Marital status:      Spouse name: Not on file    Number of children: Not on file    Years of education: Not on file    Highest education level: Not on file   Occupational History    Not on file   Tobacco Use    Smoking status: Never    Smokeless tobacco: Never   Vaping Use    Vaping status: Never Used   Substance and Sexual  Activity    Alcohol use: Yes     Alcohol/week: 4.0 standard drinks of alcohol     Types: 4 Glasses of wine per week     Comment: Usually socially    Drug use: Never    Sexual activity: Not Currently     Partners: Male     Birth control/protection: I.U.D.   Other Topics Concern    Not on file   Social History Narrative    Not on file     Social Determinants of Health     Financial Resource Strain: Low Risk  (6/22/2023)    Received from [facility]    Overall Financial Resource Strain (CARDIA)     Difficulty of Paying Living Expenses: Not very hard   Food Insecurity: No Food Insecurity (6/22/2023)    Received from [facility]    Hunger Vital Sign     Worried About Running Out of Food in the Last Year: Never true     Ran Out of Food in the Last Year: Never true   Transportation Needs: No Transportation Needs (6/22/2023)    Received from [facility]    PRAPARE - Transportation     Lack of Transportation (Medical): No     Lack of Transportation (Non-Medical): No   Physical Activity: Not on file   Stress: No Stress Concern Present (6/22/2023)    Received from [facility]    [place] [employer] - Occupational Stress Questionnaire     Feeling of Stress : Not at all   Social Connections: Moderately Isolated (6/22/2023)    Received from [facility]    Social Connection and Isolation Panel [NHANES]     Frequency of Communication with Friends and Family: More than three times a week     Frequency of Social Gatherings with Friends and Family: Once a week     Attends Jain Services: Never     Active Member of Clubs or Organizations: Yes     Attends Club or Organization Meetings: 1 to 4 times per year     Marital Status:    Intimate Partner Violence: Not At Risk (6/22/2023)    Received from [facility]    Humiliation, Afraid, Rape, and Kick questionnaire     Fear of Current or Ex-Partner: No      "Emotionally Abused: No     Physically Abused: No     Sexually Abused: No   Housing Stability: Low Risk  (6/22/2023)    Received from [facility]    Housing Stability Vital Sign     Unable to Pay for Housing in the Last Year: No     Number of Places Lived in the Last Year: 1     Unstable Housing in the Last Year: No     Scheduled Meds:  Continuous Infusions:No current facility-administered medications for this visit.    PRN Meds:.  Allergies   Allergen Reactions    Bee Venom Anaphylaxis    Medical Tape Itching and Rash     Holter and Telemetry patches    Penicillins Rash           Physical Examination:    /76   Pulse 59   Ht 5' 5\" (1.651 m)   Wt 107 kg (236 lb)   BMI 39.27 kg/m²     Gen: A&Ox3, NAD  Cardiac: regular rate  Chest: non labored breathing  Abdomen: Non-distended        Right Upper Extremity:  Skin CDI  No obvious deformity of the shoulder, arm, elbow, forearm, wrist, hand  Approximately 1 cm diameter dorsal wrist mass adjacent to the EDC tendons over the radiocarpal joint.  Nontender, firm at the central portion but there is some diffuse spongy swelling distal and proximal over the tendons.  Pain with resisted wrist and finger extension, no pain with passive wrist extension  Sensation intact to light touch in the axillary median, ulnar, and radial nerve distributions  2+RP      Left Upper Extremity:  Skin CDI  No obvious deformity of the shoulder, arm, elbow, forearm, wrist, hand  No masses  Nontender  Composite fist  Sensation intact to light touch in the axillary median, ulnar, and radial nerve distributions  2+RP      Studies:  No imaging available to review      Assessment and Plan:  1. Ganglion cyst of wrist, right  Ambulatory Referral to Orthopedic Surgery    Hand/upper extremity injection: R wrist          58 y.o. female presents with signs and symptoms consistent with the above diagnosis.  We discussed the natural history of this condition and its pathogenesis.  We " discussed operative and nonoperative treatment options.    It appears that her dorsal ganglion cyst is most likely arising from the radiocarpal joint but there is definitely a portion of swelling over the extensor tendons that I cannot rule out this being a partial extensor tenosynovitis is causing some of her dorsal swelling.  Particularly with her pain with resisted finger extension.  For her primary ganglion though we discussed continued bracing and observation versus aspiration versus surgical excision.  We discussed the risk benefits and alternatives as well as the recurrence rates which with each treatment option.  We discussed the success rate with nonoperative management as shown in the literature as well as the pros and cons of brace wear.  In the end she elected to proceed with an office right dorsal ganglion aspiration.  She understands there is a 50% chance of recurrence.  Risks of this procedure include but are not limited to infection, bleeding, recurrence, damage to surrounding structures, incomplete relief of symptoms.  She understands these risks wished to proceed.  The procedure was performed in the office today without complication.  A soft compressive wrap was placed over the wrist.  Please see procedure note for details.  She will follow-up as needed if her cyst recurs or she has continued symptoms.    she expressed understanding of the plan and agreed. We encouraged them to contact our office with any questions or concerns.         Mal Zuñiga MD  Hand and Upper Extremity Surgery        *This note was dictated using Dragon voice recognition software. Please excuse any word substitutions or errors.*

## 2024-04-25 ENCOUNTER — TELEPHONE (OUTPATIENT)
Dept: FAMILY MEDICINE CLINIC | Facility: CLINIC | Age: 59
End: 2024-04-25

## 2024-05-01 PROBLEM — Z13.1 DIABETES MELLITUS SCREENING: Status: RESOLVED | Noted: 2024-03-27 | Resolved: 2024-05-01

## 2024-05-06 ENCOUNTER — CLINICAL SUPPORT (OUTPATIENT)
Dept: BARIATRICS | Facility: CLINIC | Age: 59
End: 2024-05-06
Payer: COMMERCIAL

## 2024-05-06 VITALS — BODY MASS INDEX: 38.42 KG/M2 | WEIGHT: 230.6 LBS | HEIGHT: 65 IN

## 2024-05-06 DIAGNOSIS — E66.01 CLASS 2 SEVERE OBESITY DUE TO EXCESS CALORIES WITH SERIOUS COMORBIDITY AND BODY MASS INDEX (BMI) OF 38.0 TO 38.9 IN ADULT (HCC): Primary | ICD-10-CM

## 2024-05-06 PROCEDURE — S9470 NUTRITIONAL COUNSELING, DIET: HCPCS

## 2024-05-06 PROCEDURE — RECHECK

## 2024-05-06 NOTE — PROGRESS NOTES
"  Weight Management Medical Nutrition Assessment    Kavya is here for VLCD f/u. Current wt: 230.6lbs. Loss of 4 lbs x 4weeks. Hydration adequate. Will not continue VLCD at this time. Due to missing the feeling of \"chewing\" or not losing more weight than anticipated, she would like to transition to a ketogenic diet with whole foods and meal replacements if needed from time to time. Provided Kavya with ketogenic instructions of 50g CHO per day, meal ideas, snack ideas, and various fortified protein shakes. She will follow up if necessary.     Patient seen by Medical Provider in past 6 months:  yes  Requested to schedule appointment with Medical Provider: No      Anthropometric Measurements  Start Weight (#): 243.5 lbs 4/3/24  Current Weight (#): 230.6 lbs   TBW % Change from start weight:4%  Ideal Body Weight (#):125 lbs 65\" (BMI 25   Goal Weight (#):150 lbs  Highest: not addressed  Lowest: not addressed     Weight Loss History  Previous weight loss attempts: Nutrition Counseling with RD    Food and Nutrition Related History  Wake up: 5:30   Bed Time:9:30    Food Recall  Breakfast:7:00 replacement     Snack:10:00 egg  Lunch:12:30 replacement   Snack:3:00 cheese stick  Dinner:6:30 replacement   Snack:bar       Beverages: water and coffee   Volume of beverage intake: 80 oz    Weekends: Same  Cravings: not identified   Trouble area of day:not identified     Frequency of Eating out: none currently   Food restrictions:carbs <50 gm while on VLCD  Cooking: self   Food Shopping: self    Physical Activity Intake  Activity:n/a  Frequency: n/a  Physical limitations/barriers to exercise: no exercise while on VLCD    Estimated Needs  Energy  SECA: BMR:n/a      X 1.3 -1000 =  [place] [address] [place] Energy Needs: BMR : 1650   1-2# loss weekly sedentary:  979-1479             1-2# loss weekly lightly active:0606-1582  Maintenance calories for sedentary activity level: 1979  Protein:68-85 gm      (1.2-1.5g/kg IBW)  Fluid: 66 oz     (35mL/kg " IBW)    Nutrition Diagnosis  Yes;    Overweight/obesity  related to Excess energy intake as evidenced by  BMI more than normative standard for age and sex (obesity-grade II 35-39.9)       Nutrition Intervention    Nutrition Prescription  Calories:760  Protein:96 gm  Fluid:80 oz    Meal Plan (Umberto/Pro/Carb)  Breakfast: 200, 27, 10  Snack:  Lunch: 200, 27, 10  Snack:  Dinner: 200, 27, 10  Snack: 160, 15, 13    Nutrition Education:    VLCD  Hydration/constipation    Nutrition Counseling:  Strategies: as above     Monitoring and Evaluation:  Evaluation criteria:  Energy Intake  Meet protein needs  Maintain adequate hydration  Monitor weekly weight  Meal planning/preparation  Food journal   Decreased portions at mealtimes and snacks  Physical activity     Barriers to learning:none  Readiness to change: Action:  (Changing behavior)  Comprehension: very good  Expected Compliance: very good

## 2024-05-13 ENCOUNTER — TELEPHONE (OUTPATIENT)
Dept: BARIATRICS | Facility: CLINIC | Age: 59
End: 2024-05-13

## 2024-05-20 DIAGNOSIS — L29.2 VULVAR ITCHING: ICD-10-CM

## 2024-05-20 NOTE — TELEPHONE ENCOUNTER
Reason for call:   [x] Refill   [] Prior Auth  [] Other:     Office:   [x] PCP/Provider -   [] Specialty/Provider -     Medication: nystatin-triamcinolone (MYCOLOG-II) ointment Apply topically 2 (two) times a day       Pharmacy: [facility]    Does the patient have enough for 3 days?   [x] Yes   [] No - Send as HP to POD

## 2024-05-21 RX ORDER — NYSTATIN AND TRIAMCINOLONE ACETONIDE 100000; 1 [USP'U]/G; MG/G
OINTMENT TOPICAL 2 TIMES DAILY
Qty: 30 G | Refills: 1 | Status: SHIPPED | OUTPATIENT
Start: 2024-05-21

## 2024-06-17 ENCOUNTER — TELEPHONE (OUTPATIENT)
Age: 59
End: 2024-06-17

## 2024-06-17 NOTE — TELEPHONE ENCOUNTER
Patient called and decided she would like to have the script for Zepbound.  If the PCP will write the script, pls do an insurance referral and send to [facility] in [place].

## 2024-06-18 ENCOUNTER — TELEPHONE (OUTPATIENT)
Dept: FAMILY MEDICINE CLINIC | Facility: CLINIC | Age: 59
End: 2024-06-18

## 2024-06-18 DIAGNOSIS — E66.09 CLASS 2 OBESITY DUE TO EXCESS CALORIES WITHOUT SERIOUS COMORBIDITY WITH BODY MASS INDEX (BMI) OF 38.0 TO 38.9 IN ADULT: Primary | ICD-10-CM

## 2024-06-18 RX ORDER — TIRZEPATIDE 2.5 MG/.5ML
2.5 INJECTION, SOLUTION SUBCUTANEOUS WEEKLY
Qty: 2 ML | Refills: 0 | Status: SHIPPED | OUTPATIENT
Start: 2024-06-18 | End: 2024-06-26 | Stop reason: SDUPTHER

## 2024-06-18 NOTE — TELEPHONE ENCOUNTER
Pt aware, and aware auth was sent to prior auth team.   She will call back to schedule 1 month after she receives medication

## 2024-06-24 ENCOUNTER — TELEPHONE (OUTPATIENT)
Dept: FAMILY MEDICINE CLINIC | Facility: CLINIC | Age: 59
End: 2024-06-24

## 2024-06-25 NOTE — TELEPHONE ENCOUNTER
PA for Zepbound 2.5mg    Submitted via    []CMM-KEY   [x][employer]-Case ID # [id number]   []Faxed to plan   []Other website   []Phone call Case ID #     Office notes sent, clinical questions answered. Awaiting determination    Turnaround time for your insurance to make a decision on your Prior Authorization can take 7-21 business days.

## 2024-06-26 DIAGNOSIS — E66.09 CLASS 2 OBESITY DUE TO EXCESS CALORIES WITHOUT SERIOUS COMORBIDITY WITH BODY MASS INDEX (BMI) OF 38.0 TO 38.9 IN ADULT: ICD-10-CM

## 2024-06-26 NOTE — TELEPHONE ENCOUNTER
Medication: tirzepatide    Dose/Frequency: 2.5 mg/0.5 mL auto-injector, Inject 0.5 mL (2.5 mg total) under the skin once a week for 28 days     Quantity: 2 mL    Pharmacy: [facility]    Office:   [x] PCP/Provider - CONSTANCE Joseph  [] Speciality/Provider -     Does the patient have enough for 3 days?   [] Yes   [x] No - Send as HP to POD       This is a change of pharmacy, previously sent to [facility], notified they do not have med in stock - patient requesting it to now go to [facility].

## 2024-06-26 NOTE — TELEPHONE ENCOUNTER
Patient calling to check on status of prior auth. Advised PA has been received and approved as of yesterday evening. Advised she call her pharmacy to make sure they have also received the prior auth info. She thanks us for our help!

## 2024-06-27 RX ORDER — TIRZEPATIDE 2.5 MG/.5ML
2.5 INJECTION, SOLUTION SUBCUTANEOUS WEEKLY
Qty: 2 ML | Refills: 0 | Status: SHIPPED | OUTPATIENT
Start: 2024-06-27 | End: 2024-07-25

## 2024-06-27 NOTE — PROGRESS NOTES
"Jaime Stevens is a 59 y.o. female who presents for annual exam.  Last Pap smear 23 ASCUS/ HR HPV 18 positive. 5/10/23 colposcopy negative.  Last mammogram 23 birads 1.  CRC screening colonoscopy 23 due for repeat in 7 years. The patient has no complaints today. The patient is not currently sexually active.  The patient is not taking hormone replacement therapy. Patient denies post-menopausal vaginal bleeding.. The patient wears seatbelts: yes. The patient participates in regular exercise: no. The patient reports that there is not domestic violence in her life.     Requesting refill for ciclopirox uses it for ear as needed.  Dermatologist is no longer employed by [facility].  Is also using Mycolog as needed for itching and irritation of vulva, declines refills at this time     Menstrual History:  OB History          1    Para   1    Term   1            AB        Living   1         SAB        IAB        Ectopic        Multiple        Live Births   1           Obstetric Comments   Menarche age 14              Menarche age: 14  No LMP recorded. Patient has had an implant.       The following portions of the patient's history were reviewed and updated as appropriate: allergies, current medications, past family history, past medical history, past social history, past surgical history, and problem list.    Review of Systems  Pertinent items are noted in HPI.     Objective      /80   Ht 5' 5\" (1.651 m)   Wt 101 kg (222 lb)   BMI 36.94 kg/m²     General:   alert and oriented, in no acute distress and moderately obese   Heart: regular rate and rhythm, S1, S2 normal, no murmur, click, rub or gallop   Lungs: clear to auscultation bilaterally   Abdomen: soft, non-tender, without masses or organomegaly   Vulva: normal, Bartholin's, Urethra, [place]'s normal   Vagina: normal mucosa, normal discharge, no palpable nodules   Cervix: no bleeding following Pap, no cervical motion " tenderness, and no lesions   Uterus: Nontender, difficult to assess due to body habitus   Adnexa: Nontender: Difficult to assess due to body habitus   Breast:  breasts appear normal, no suspicious masses, no skin or nipple changes or axillary nodes.          Assessment/Plan     Diagnoses and all orders for this visit:    Well woman exam with routine gynecological exam  -     Liquid-based pap, screening    Breast cancer screening by mammogram  -     Mammo screening bilateral w 3d & cad; Future    Dermatitis  -     ciclopirox (LOPROX) 0.77 % cream; Apply topically 2 (two) times a day  -     Ambulatory Referral to Dermatology; Future      Encouraged healthy diet, exercise and lifestyle  Encouraged follow-up with PCP and specialists as needed  Encouraged supplementation with calcium, vitamin-D and strength training exercises for good bone health  Will call / [employer] message with results  VBI-   BMI Counseling: Body mass index is 36.94 kg/m². The BMI is above normal. Nutrition recommendations include 3-5 servings of fruits/vegetables daily. Exercise recommendations include exercising 3-5 times per week.  Continue following with weight management

## 2024-07-01 ENCOUNTER — ANNUAL EXAM (OUTPATIENT)
Dept: OBGYN CLINIC | Facility: MEDICAL CENTER | Age: 59
End: 2024-07-01
Payer: COMMERCIAL

## 2024-07-01 VITALS
WEIGHT: 222 LBS | BODY MASS INDEX: 36.99 KG/M2 | DIASTOLIC BLOOD PRESSURE: 80 MMHG | HEIGHT: 65 IN | SYSTOLIC BLOOD PRESSURE: 114 MMHG

## 2024-07-01 DIAGNOSIS — Z12.31 BREAST CANCER SCREENING BY MAMMOGRAM: ICD-10-CM

## 2024-07-01 DIAGNOSIS — Z01.419 WELL WOMAN EXAM WITH ROUTINE GYNECOLOGICAL EXAM: Primary | ICD-10-CM

## 2024-07-01 DIAGNOSIS — L30.9 DERMATITIS: ICD-10-CM

## 2024-07-01 PROCEDURE — S0612 ANNUAL GYNECOLOGICAL EXAMINA: HCPCS | Performed by: NURSE PRACTITIONER

## 2024-07-01 PROCEDURE — G0145 SCR C/V CYTO,THINLAYER,RESCR: HCPCS | Performed by: NURSE PRACTITIONER

## 2024-07-01 PROCEDURE — G0476 HPV COMBO ASSAY CA SCREEN: HCPCS | Performed by: NURSE PRACTITIONER

## 2024-07-02 LAB
HPV HR 12 DNA CVX QL NAA+PROBE: POSITIVE
HPV16 DNA CVX QL NAA+PROBE: NEGATIVE
HPV18 DNA CVX QL NAA+PROBE: NEGATIVE

## 2024-07-03 ENCOUNTER — TELEPHONE (OUTPATIENT)
Age: 59
End: 2024-07-03

## 2024-07-03 NOTE — TELEPHONE ENCOUNTER
Pt made aware of HPV positive, non 16/18. Aware that we are waiting for the cytology results which will guide further recommendations. Pt verbalized understanding and is thankful.

## 2024-07-03 NOTE — TELEPHONE ENCOUNTER
Pt called in stating she saw her HPV results on the portal and would like to review. Advised Marlin Nolasco, CONSTANCE has not yet reviewed. Still awaiting pap results as well. Pt verbalized understanding.

## 2024-07-08 ENCOUNTER — OFFICE VISIT (OUTPATIENT)
Dept: PODIATRY | Facility: CLINIC | Age: 59
End: 2024-07-08
Payer: COMMERCIAL

## 2024-07-08 VITALS — HEART RATE: 60 BPM | DIASTOLIC BLOOD PRESSURE: 80 MMHG | SYSTOLIC BLOOD PRESSURE: 114 MMHG

## 2024-07-08 DIAGNOSIS — M19.072 ARTHRITIS OF LEFT SUBTALAR JOINT: Primary | ICD-10-CM

## 2024-07-08 DIAGNOSIS — M19.072 ARTHRITIS OF LEFT ANKLE: ICD-10-CM

## 2024-07-08 PROCEDURE — 99203 OFFICE O/P NEW LOW 30 MIN: CPT | Performed by: PODIATRIST

## 2024-07-08 NOTE — PROGRESS NOTES
Ambulatory Visit  Name: Kavya Stevens      : 1965      MRN: [id number]  Encounter Provider: Robert Ruiz DPM  Encounter Date: 2024   Encounter department: [facility] [place]    Assessment & Plan   1. Arthritis of left subtalar joint  -     CT lower extremity wo contrast left; Future; Expected date: 2024  2. Arthritis of left ankle  -     CT lower extremity wo contrast left; Future; Expected date: 2024  Diagnosis and options discussed with patient  Patient agreeable to the plan as stated below    Patient has severe rearfoot limited ROM and arthritis. She has previous XR but before I can give a clear answer of the joint needs, suggest MRI. Possible fusion.     History of Present Illness     Kavya Stevens is a 59 y.o. female who presents with chronic left ankle pain. She states she has severe ankle arthritis, she's had multiple XRays that showed arthritis. She has had multiple steroid injections but they are only lasting a few weeks. She had been seeing a podiatrist at [facility] who told her she may need her ankle replaced so she was referred to [facility].     Review of Systems  As stated in HPI, otherwise normal    Medical History Reviewed by provider this encounter:  Tobacco  Allergies  Meds  Problems  Med Hx  Surg Hx  Fam Hx        Objective     /80 (BP Location: Right arm, Patient Position: Sitting, Cuff Size: Standard)   Pulse 60     Physical Exam  Vitals reviewed.   Constitutional:       Appearance: She is obese. She is not ill-appearing.   Cardiovascular:      Rate and Rhythm: Normal rate.      Pulses: Normal pulses.           Dorsalis pedis pulses are 2+ on the left side.        Posterior tibial pulses are 2+ on the left side.   Pulmonary:      Effort: Pulmonary effort is normal. No respiratory distress.   Musculoskeletal:         General: Tenderness and deformity present.      Left ankle: Deformity (left ankle crepitus with DF/PF. She has severely limited STJ  ROM with crepitus.) present.      Left foot: Decreased range of motion (severe limited STJ ROM left foot with creptius, the ankle is also painful, see above).   Feet:      Left foot:      Skin integrity: Skin integrity normal.   Skin:     Findings: No bruising.   Neurological:      Mental Status: She is alert.       Administrative Statements

## 2024-07-09 LAB
LAB AP GYN PRIMARY INTERPRETATION: NORMAL
Lab: NORMAL

## 2024-07-15 ENCOUNTER — TELEPHONE (OUTPATIENT)
Age: 59
End: 2024-07-15

## 2024-07-15 DIAGNOSIS — E66.09 CLASS 2 OBESITY DUE TO EXCESS CALORIES WITHOUT SERIOUS COMORBIDITY WITH BODY MASS INDEX (BMI) OF 38.0 TO 38.9 IN ADULT: Primary | ICD-10-CM

## 2024-07-15 RX ORDER — TIRZEPATIDE 5 MG/.5ML
5 INJECTION, SOLUTION SUBCUTANEOUS WEEKLY
Qty: 2 ML | Refills: 0 | Status: CANCELLED | OUTPATIENT
Start: 2024-07-15

## 2024-07-15 NOTE — TELEPHONE ENCOUNTER
Pt is requesting next dose of zepbound. She is  scheduled for appt on 7/24 but will be out before the visit.

## 2024-07-15 NOTE — TELEPHONE ENCOUNTER
Patient called to check if her next appointment is too late.  Patient is taking Zepbound and her last dose will be 7/17/24.  Appointment is scheduled for 7/24/24 which will be when her next dose would be due.  Attempted to re-schedule appointment to an earlier date, but there were no appointments available.  Please advise and return patients call.

## 2024-07-16 ENCOUNTER — HOSPITAL ENCOUNTER (OUTPATIENT)
Dept: CT IMAGING | Facility: HOSPITAL | Age: 59
Discharge: HOME | End: 2024-07-16
Attending: PODIATRIST
Payer: COMMERCIAL

## 2024-07-16 DIAGNOSIS — M19.072 ARTHRITIS OF LEFT ANKLE: ICD-10-CM

## 2024-07-16 DIAGNOSIS — M19.072 ARTHRITIS OF LEFT SUBTALAR JOINT: ICD-10-CM

## 2024-07-16 PROCEDURE — 73700 CT LOWER EXTREMITY W/O DYE: CPT

## 2024-07-16 NOTE — TELEPHONE ENCOUNTER
Pt called to see if any response to her question about refilling her zepound before her appt or if she should wait until her 7/24 appt.    Pt is taking last dose of zepound 7/17 and will not have any for the following week.      I checked with RX to see if I should request a refill at this time.  It was suggested to wait until her appt on 7/24 to discuss dose.    See previous messages.

## 2024-07-17 ENCOUNTER — TELEPHONE (OUTPATIENT)
Age: 59
End: 2024-07-17

## 2024-07-17 DIAGNOSIS — E66.09 CLASS 2 OBESITY DUE TO EXCESS CALORIES WITHOUT SERIOUS COMORBIDITY WITH BODY MASS INDEX (BMI) OF 38.0 TO 38.9 IN ADULT: ICD-10-CM

## 2024-07-17 DIAGNOSIS — E66.09 CLASS 2 OBESITY DUE TO EXCESS CALORIES WITHOUT SERIOUS COMORBIDITY WITH BODY MASS INDEX (BMI) OF 38.0 TO 38.9 IN ADULT: Primary | ICD-10-CM

## 2024-07-17 RX ORDER — TIRZEPATIDE 5 MG/.5ML
5 INJECTION, SOLUTION SUBCUTANEOUS WEEKLY
Qty: 2 ML | Refills: 0 | Status: SHIPPED | OUTPATIENT
Start: 2024-07-17 | End: 2024-07-17 | Stop reason: SDUPTHER

## 2024-07-17 RX ORDER — TIRZEPATIDE 5 MG/.5ML
5 INJECTION, SOLUTION SUBCUTANEOUS WEEKLY
Qty: 2 ML | Refills: 0 | Status: SHIPPED | OUTPATIENT
Start: 2024-07-17 | End: 2024-07-24

## 2024-07-17 NOTE — TELEPHONE ENCOUNTER
Reason for call:   [x] Refill   [] Prior Auth  [] Other: NOT A DUPLICATE REQUEST- [facility] [place]    Office:   [x] PCP/Provider - Yasmeen  [] Specialty/Provider -         Does the patient have enough for 3 days?   [] Yes   [x] No - Send as HP to POD

## 2024-07-18 NOTE — TELEPHONE ENCOUNTER
Patient would like a call back to discuss the benefits of increasing her Zepbound dose compared to staying on her current dose.

## 2024-07-24 ENCOUNTER — OFFICE VISIT (OUTPATIENT)
Dept: FAMILY MEDICINE CLINIC | Facility: CLINIC | Age: 59
End: 2024-07-24
Payer: COMMERCIAL

## 2024-07-24 VITALS
OXYGEN SATURATION: 95 % | HEART RATE: 66 BPM | SYSTOLIC BLOOD PRESSURE: 120 MMHG | RESPIRATION RATE: 16 BRPM | BODY MASS INDEX: 35.82 KG/M2 | HEIGHT: 65 IN | TEMPERATURE: 99.4 F | DIASTOLIC BLOOD PRESSURE: 84 MMHG | WEIGHT: 215 LBS

## 2024-07-24 DIAGNOSIS — Z00.00 HEALTHCARE MAINTENANCE: ICD-10-CM

## 2024-07-24 DIAGNOSIS — E78.5 HYPERLIPIDEMIA, UNSPECIFIED HYPERLIPIDEMIA TYPE: ICD-10-CM

## 2024-07-24 DIAGNOSIS — E66.01 CLASS 2 SEVERE OBESITY DUE TO EXCESS CALORIES WITH SERIOUS COMORBIDITY AND BODY MASS INDEX (BMI) OF 35.0 TO 35.9 IN ADULT (HCC): Primary | ICD-10-CM

## 2024-07-24 PROBLEM — E66.812 CLASS 2 SEVERE OBESITY DUE TO EXCESS CALORIES WITH SERIOUS COMORBIDITY AND BODY MASS INDEX (BMI) OF 35.0 TO 35.9 IN ADULT (HCC): Status: ACTIVE | Noted: 2024-07-24

## 2024-07-24 PROCEDURE — 99214 OFFICE O/P EST MOD 30 MIN: CPT | Performed by: NURSE PRACTITIONER

## 2024-07-24 PROCEDURE — 99396 PREV VISIT EST AGE 40-64: CPT | Performed by: NURSE PRACTITIONER

## 2024-07-24 RX ORDER — TIRZEPATIDE 7.5 MG/.5ML
7.5 INJECTION, SOLUTION SUBCUTANEOUS WEEKLY
Qty: 2 ML | Refills: 0 | Status: SHIPPED | OUTPATIENT
Start: 2024-07-24

## 2024-07-24 NOTE — ASSESSMENT & PLAN NOTE
- Reviewed immunizations and screenings.  -Discussed regular dental, vision, and GYN exams.  -Her colonoscopy is up-to-date.  -Has mammogram scheduled for 8/27.

## 2024-07-24 NOTE — ASSESSMENT & PLAN NOTE
- She is down 39 pounds since her last visit in March.  -We will increase up down to 7.5 mg weekly.  Discussed side effects.  -Encouraged healthy diet and regular exercise.  -Recommend follow-up in 1 month.

## 2024-07-24 NOTE — ASSESSMENT & PLAN NOTE
Component      Latest Ref Rng 6/17/2023 4/20/2024   Cholesterol      See Comment mg/dL 238 (H)  229 (H)    Triglycerides      See Comment mg/dL 116  88    HDL      >=50 mg/dL 77  68    LDL Calculated      0 - 100 mg/dL 138 (H)  143 (H)       -Not well-controlled.  -Discussed healthy diet, regular exercise, and continued weight loss.  -We will continue to monitor fasting lipid panel.  Consider statin if no improvement.

## 2024-07-24 NOTE — PROGRESS NOTES
Ambulatory Visit  Name: Kavya Stevens      : 1965      MRN: [id number]  Encounter Provider: CONSTANCE Aguiar  Encounter Date: 2024   Encounter department: [facility]    Assessment & Plan   1. Class 2 severe obesity due to excess calories with serious comorbidity and body mass index (BMI) of 35.0 to 35.9 in adult (HCC)  Assessment & Plan:  - She is down 39 pounds since her last visit in March.  -We will increase up down to 7.5 mg weekly.  Discussed side effects.  -Encouraged healthy diet and regular exercise.  -Recommend follow-up in 1 month.  Orders:  -     tirzepatide (Zepbound) 7.5 mg/0.5 mL auto-injector; Inject 0.5 mL (7.5 mg total) under the skin once a week  2. Hyperlipidemia, unspecified hyperlipidemia type  Assessment & Plan:  Component      Latest Ref Rng 2023   Cholesterol      See Comment mg/dL 238 (H)  229 (H)    Triglycerides      See Comment mg/dL 116  88    HDL      >=50 mg/dL 77  68    LDL Calculated      0 - 100 mg/dL 138 (H)  143 (H)       -Not well-controlled.  -Discussed healthy diet, regular exercise, and continued weight loss.  -We will continue to monitor fasting lipid panel.  Consider statin if no improvement.  3. Healthcare maintenance  Assessment & Plan:  - Reviewed immunizations and screenings.  -Discussed regular dental, vision, and GYN exams.  -Her colonoscopy is up-to-date.  -Has mammogram scheduled for .       History of Present Illness     Patient presents office today for follow-up.  She has been taking Zepbound for weight loss.  She has lost 39 pounds since her last visit in March.  Denies any significant side effects from the medication.  She is up-to-date with her blood work.  She denies any other concerns or complaints today.        Review of Systems   Constitutional:  Negative for fatigue and fever.   HENT:  Negative for congestion, postnasal drip and trouble swallowing.    Eyes:  Negative for visual disturbance.    Respiratory:  Negative for cough and shortness of breath.    Cardiovascular:  Negative for chest pain and palpitations.   Gastrointestinal:  Negative for abdominal pain and blood in stool.   Endocrine: Negative for cold intolerance and heat intolerance.   Genitourinary:  Negative for difficulty urinating, dysuria and frequency.   Musculoskeletal:  Negative for gait problem.   Skin:  Negative for rash.   Neurological:  Negative for dizziness, syncope and headaches.   Hematological:  Negative for adenopathy.   Psychiatric/Behavioral:  Negative for behavioral problems.      Past Medical History:   Diagnosis Date   • Abnormal Pap smear of cervix    • Arthritis    • Hyperlipidemia    • Raynaud disease      Past Surgical History:   Procedure Laterality Date   • BUNIONECTOMY Left    • EYE SURGERY Bilateral     laser surgery, to recorrect vision   • FINGER FRACTURE SURGERY Right 1982   • TONSILLECTOMY AND ADENOIDECTOMY       Family History   Adopted: Yes   Problem Relation Age of Onset   • Migraines Son    • Pulmonary embolism Son      Social History     Tobacco Use   • Smoking status: Never   • Smokeless tobacco: Never   Vaping Use   • Vaping status: Never Used   Substance and Sexual Activity   • Alcohol use: Yes     Alcohol/week: 4.0 standard drinks of alcohol     Types: 4 Glasses of wine per week     Comment: Usually socially   • Drug use: Never   • Sexual activity: Not Currently     Partners: Male     Current Outpatient Medications on File Prior to Visit   Medication Sig   • calcium-vitamin D (OSCAL) 250-125 MG-UNIT per tablet Take 1 tablet by mouth daily   • ciclopirox (LOPROX) 0.77 % cream Apply topically 2 (two) times a day   • glucosamine-chondroitin 500-400 MG tablet Take 1 tablet by mouth 3 (three) times a day   • ibuprofen (MOTRIN) 200 mg tablet Take 200 mg by mouth every 6 (six) hours as needed for mild pain   • Multiple Vitamin (multivitamin) tablet Take 1 tablet by mouth daily   • nystatin-triamcinolone  "(MYCOLOG-II) ointment Apply topically 2 (two) times a day   • Omega-3 Fatty Acids (FISH OIL PO) Take by mouth in the morning   • Turmeric 500 MG CAPS Take by mouth   • [DISCONTINUED] tirzepatide (Zepbound) 5 mg/0.5 mL auto-injector Inject 0.5 mL (5 mg total) under the skin once a week   • albuterol (PROVENTIL HFA,VENTOLIN HFA) 90 mcg/act inhaler Inhale 2 puffs every 6 (six) hours as needed for wheezing (Patient not taking: Reported on 4/23/2024)     Allergies   Allergen Reactions   • Bee Venom Anaphylaxis   • Medical Tape Itching and Rash     Holter and Telemetry patches   • Penicillins Rash     Immunization History   Administered Date(s) Administered   • COVID-19 MODERNA VACC 0.5 ML IM 03/17/2021, 04/14/2021, 11/04/2021   • COVID-19 Moderna mRNA Vaccine 12 Yr+ 50 mcg/0.5 mL (Spikevax) 12/20/2023   • COVID-19 PFIZER VACCINE 0.3 ML IM 10/14/2022   • COVID-19 Pfizer Vac BIVALENT Jim-sucrose 12 Yr+ IM 10/14/2022   • INFLUENZA 10/03/2011, 03/10/2014, 10/12/2016, 09/26/2019, 10/29/2020, 11/22/2021, 10/14/2022   • Influenza Quadrivalent Preservative Free 3 years and older IM 09/26/2019, 10/29/2020   • Tdap 03/29/2019   • Tuberculin Skin Test-PPD Intradermal 09/23/2021   • Zoster Vaccine Recombinant 10/30/2020, 05/01/2021     Objective     /84 (BP Location: Left arm, Patient Position: Sitting, Cuff Size: Large)   Pulse 66   Temp 99.4 °F (37.4 °C) (Tympanic)   Resp 16   Ht 5' 5\" (1.651 m)   Wt 97.5 kg (215 lb)   SpO2 95%   BMI 35.78 kg/m²     Physical Exam  Vitals and nursing note reviewed.   Constitutional:       Appearance: Normal appearance. She is well-developed. She is not ill-appearing.   HENT:      Head: Normocephalic and atraumatic.      Right Ear: Tympanic membrane, ear canal and external ear normal.      Left Ear: Tympanic membrane, ear canal and external ear normal.      Nose: Nose normal.      Mouth/Throat:      Mouth: Mucous membranes are moist.      Pharynx: No posterior oropharyngeal erythema. "   Eyes:      Conjunctiva/sclera: Conjunctivae normal.      Pupils: Pupils are equal, round, and reactive to light.   Cardiovascular:      Rate and Rhythm: Normal rate and regular rhythm.      Heart sounds: Normal heart sounds. No murmur heard.  Pulmonary:      Effort: Pulmonary effort is normal. No respiratory distress.      Breath sounds: Normal breath sounds.   Abdominal:      General: Bowel sounds are normal.      Palpations: Abdomen is soft.   Musculoskeletal:         General: No swelling. Normal range of motion.      Cervical back: Normal range of motion.   Lymphadenopathy:      Cervical: No cervical adenopathy.   Skin:     General: Skin is warm and dry.   Neurological:      Mental Status: She is alert and oriented to person, place, and time.   Psychiatric:         Mood and Affect: Mood normal.         Behavior: Behavior normal.

## 2024-07-26 ENCOUNTER — OFFICE VISIT (OUTPATIENT)
Dept: URGENT CARE | Age: 59
End: 2024-07-26
Payer: COMMERCIAL

## 2024-07-26 VITALS
BODY MASS INDEX: 35.56 KG/M2 | OXYGEN SATURATION: 99 % | DIASTOLIC BLOOD PRESSURE: 79 MMHG | TEMPERATURE: 97.2 F | HEIGHT: 65 IN | WEIGHT: 213.4 LBS | SYSTOLIC BLOOD PRESSURE: 137 MMHG | HEART RATE: 59 BPM | RESPIRATION RATE: 20 BRPM

## 2024-07-26 DIAGNOSIS — L03.113 CELLULITIS OF RIGHT UPPER EXTREMITY: Primary | ICD-10-CM

## 2024-07-26 PROCEDURE — 99213 OFFICE O/P EST LOW 20 MIN: CPT

## 2024-07-26 RX ORDER — DOXYCYCLINE 100 MG/1
100 TABLET ORAL 2 TIMES DAILY
Qty: 14 TABLET | Refills: 0 | Status: SHIPPED | OUTPATIENT
Start: 2024-07-26 | End: 2024-08-02

## 2024-07-26 NOTE — PROGRESS NOTES
[facility]        NAME: Kavya Stevens is a 59 y.o. female  : 1965    MRN: [id number]  DATE: 2024  TIME: 5:29 PM    Assessment and Plan   Cellulitis of right upper extremity [L03.113]  1. Cellulitis of right upper extremity  doxycycline (ADOXA) 100 MG tablet            Patient Instructions     Start antibiotic as prescribed  Keep area clean and dry  Watch for signs of infection  Follow up with PCP in 3-5 days.  Proceed to ER if symptoms worsen.     Eat yogurt with live and active cultures and/or take a probiotic at least 3 hours before or after antibiotic dose. Monitor stool for diarrhea and/or blood. If this occurs, contact primary care doctor ASAP.      Doxycycline may cause your skin to be more sensitive to sunlight than it is normally. Exposure to sunlight, even for short periods of time, may cause skin rash, itching, redness or other discoloration of the skin, or a severe sunburn. Practice proper precautions including avoiding excessive sunlight exposure, wear skin protection including clothing and sunscreen to avoid reaction.    If tests are performed, our office will contact you with results only if changes need to made to the care plan discussed with you at the visit. You can review your full results on [facility].    Chief Complaint     Chief Complaint   Patient presents with    Insect Bite     Pt presents with possible spider bite on lateral right thigh Saturday. Bite became enlarged and then went away. Pt saw PCP on Wednesday and advised to monitor. Yesterday morning pt noted another bug bite looking spot with a ring around it in the same area and a ring around the original spot. Pt notes firmness, pain, itchiness, and warmth at the site.         History of Present Illness       Patient is a 59-year-old female with no significant PMH presenting in the clinic today for insect bite x 6 days.  Patient states she first noticed an insect bite wound located along the lateral  aspect of the right thigh.  Patient states the area was gradually healing.  Patient notes approximately 2 days ago noticing another bite wound area.  Admits erythema, induration, pruritus, warmth, and swelling located along the right lateral thigh.  Denies fever, chills, headache, dizziness, chest pain, SOB, numbness, tingling, abdominal pain, nausea, vomiting, and diarrhea.  Admits to use of ice therapy and hydrocortisone topical for symptom management.  Denies household members with a similar rash.  Admits allergies to penicillins.        Review of Systems   Review of Systems   Constitutional:  Negative for chills and fever.   Respiratory:  Negative for shortness of breath.    Cardiovascular:  Negative for chest pain.   Gastrointestinal:  Negative for abdominal pain, diarrhea, nausea and vomiting.   Musculoskeletal:  Negative for arthralgias and joint swelling.   Skin:  Positive for wound. Negative for rash.   Neurological:  Negative for dizziness, numbness and headaches.         Current Medications       Current Outpatient Medications:     doxycycline (ADOXA) 100 MG tablet, Take 1 tablet (100 mg total) by mouth 2 (two) times a day for 7 days, Disp: 14 tablet, Rfl: 0    albuterol (PROVENTIL HFA,VENTOLIN HFA) 90 mcg/act inhaler, Inhale 2 puffs every 6 (six) hours as needed for wheezing (Patient not taking: Reported on 4/23/2024), Disp: 18 g, Rfl: 0    calcium-vitamin D (OSCAL) 250-125 MG-UNIT per tablet, Take 1 tablet by mouth daily, Disp: , Rfl:     ciclopirox (LOPROX) 0.77 % cream, Apply topically 2 (two) times a day, Disp: 30 g, Rfl: 0    glucosamine-chondroitin 500-400 MG tablet, Take 1 tablet by mouth 3 (three) times a day, Disp: , Rfl:     ibuprofen (MOTRIN) 200 mg tablet, Take 200 mg by mouth every 6 (six) hours as needed for mild pain, Disp: , Rfl:     Multiple Vitamin (multivitamin) tablet, Take 1 tablet by mouth daily, Disp: , Rfl:     nystatin-triamcinolone (MYCOLOG-II) ointment, Apply topically 2 (two)  "times a day, Disp: 30 g, Rfl: 1    Omega-3 Fatty Acids (FISH OIL PO), Take by mouth in the morning, Disp: , Rfl:     tirzepatide (Zepbound) 7.5 mg/0.5 mL auto-injector, Inject 0.5 mL (7.5 mg total) under the skin once a week, Disp: 2 mL, Rfl: 0    Turmeric 500 MG CAPS, Take by mouth, Disp: , Rfl:     Current Allergies     Allergies as of 07/26/2024 - Reviewed 07/26/2024   Allergen Reaction Noted    Bee venom Anaphylaxis 03/05/2023    Medical tape Itching and Rash 03/17/2020    Penicillins Rash 03/23/2015            The following portions of the patient's history were reviewed and updated as appropriate: allergies, current medications, past family history, past medical history, past social history, past surgical history and problem list.     Past Medical History:   Diagnosis Date    Abnormal Pap smear of cervix     Arthritis     Hyperlipidemia     Raynaud disease        Past Surgical History:   Procedure Laterality Date    BUNIONECTOMY Left     EYE SURGERY Bilateral     laser surgery, to recorrect vision    FINGER FRACTURE SURGERY Right 1982    TONSILLECTOMY AND ADENOIDECTOMY         Family History   Adopted: Yes   Problem Relation Age of Onset    Migraines Son     Pulmonary embolism Son          Medications have been verified.        Objective   /79   Pulse 59   Temp (!) 97.2 °F (36.2 °C)   Resp 20   Ht 5' 5\" (1.651 m)   Wt 96.8 kg (213 lb 6.4 oz)   SpO2 99%   BMI 35.51 kg/m²        Physical Exam     Physical Exam  Vitals reviewed.   Constitutional:       General: She is not in acute distress.     Appearance: Normal appearance. She is normal weight. She is not ill-appearing.   HENT:      Head: Normocephalic.      Nose: Nose normal.      Mouth/Throat:      Mouth: Mucous membranes are moist.   Eyes:      Conjunctiva/sclera: Conjunctivae normal.   Cardiovascular:      Rate and Rhythm: Normal rate and regular rhythm.      Pulses: Normal pulses.      Heart sounds: Normal heart sounds. No murmur heard.     No " friction rub. No gallop.   Pulmonary:      Effort: Pulmonary effort is normal.      Breath sounds: Normal breath sounds. No wheezing, rhonchi or rales.   Musculoskeletal:      Cervical back: Normal range of motion and neck supple.   Skin:     General: Skin is warm.      Findings: No rash.      Comments: 2 areas of induration, warmth, and blanching erythema noted along the lateral right thigh.  No drainage noted.  Consistent with cellulitis.   Neurological:      Mental Status: She is alert.   Psychiatric:         Mood and Affect: Mood normal.         Behavior: Behavior normal.

## 2024-07-26 NOTE — PATIENT INSTRUCTIONS
Start antibiotic as prescribed  Keep area clean and dry  Watch for signs of infection  Follow up with PCP in 3-5 days.  Proceed to ER if symptoms worsen.     Eat yogurt with live and active cultures and/or take a probiotic at least 3 hours before or after antibiotic dose. Monitor stool for diarrhea and/or blood. If this occurs, contact primary care doctor ASAP.      Doxycycline may cause your skin to be more sensitive to sunlight than it is normally. Exposure to sunlight, even for short periods of time, may cause skin rash, itching, redness or other discoloration of the skin, or a severe sunburn. Practice proper precautions including avoiding excessive sunlight exposure, wear skin protection including clothing and sunscreen to avoid reaction.

## 2024-07-29 ENCOUNTER — TELEPHONE (OUTPATIENT)
Age: 59
End: 2024-07-29

## 2024-07-29 NOTE — TELEPHONE ENCOUNTER
PA for tirzepatide (Zepbound) 7.5 mg/0.5 mL auto-injector not required     Reason (screenshot if applicable)          Patient advised by          [] MyChart Message  [x] Phone call   []LMOM  []L/M to call office as no active Communication consent on file  []Unable to leave detailed message as VM not approved on Communication consent       Pharmacy advised by    [x]Fax  []Phone call

## 2024-08-08 NOTE — TELEPHONE ENCOUNTER
Pt saw that her dosage is moving up with her Zepbound and has concerns that she may not do well with it. Pt states she currently gets a little nauseous after eating with the 5mg but is ok with that and wants to stay on this level for longer if possible.    Please advise

## 2024-08-13 DIAGNOSIS — E66.01 CLASS 2 SEVERE OBESITY DUE TO EXCESS CALORIES WITH SERIOUS COMORBIDITY AND BODY MASS INDEX (BMI) OF 35.0 TO 35.9 IN ADULT (HCC): Primary | ICD-10-CM

## 2024-08-13 RX ORDER — TIRZEPATIDE 5 MG/.5ML
5 INJECTION, SOLUTION SUBCUTANEOUS WEEKLY
Qty: 2 ML | Refills: 0 | Status: SHIPPED | OUTPATIENT
Start: 2024-08-13 | End: 2024-08-14 | Stop reason: SDUPTHER

## 2024-08-14 ENCOUNTER — OFFICE VISIT (OUTPATIENT)
Dept: FAMILY MEDICINE CLINIC | Facility: CLINIC | Age: 59
End: 2024-08-14
Payer: COMMERCIAL

## 2024-08-14 ENCOUNTER — TELEPHONE (OUTPATIENT)
Age: 59
End: 2024-08-14

## 2024-08-14 VITALS
BODY MASS INDEX: 34.82 KG/M2 | DIASTOLIC BLOOD PRESSURE: 84 MMHG | OXYGEN SATURATION: 97 % | RESPIRATION RATE: 16 BRPM | TEMPERATURE: 98.5 F | HEIGHT: 65 IN | SYSTOLIC BLOOD PRESSURE: 122 MMHG | HEART RATE: 62 BPM | WEIGHT: 209 LBS

## 2024-08-14 DIAGNOSIS — E66.01 CLASS 2 SEVERE OBESITY DUE TO EXCESS CALORIES WITH SERIOUS COMORBIDITY AND BODY MASS INDEX (BMI) OF 35.0 TO 35.9 IN ADULT (HCC): ICD-10-CM

## 2024-08-14 DIAGNOSIS — E66.09 CLASS 1 OBESITY DUE TO EXCESS CALORIES WITH SERIOUS COMORBIDITY AND BODY MASS INDEX (BMI) OF 34.0 TO 34.9 IN ADULT: Primary | ICD-10-CM

## 2024-08-14 PROBLEM — E66.811 CLASS 1 OBESITY DUE TO EXCESS CALORIES WITH SERIOUS COMORBIDITY AND BODY MASS INDEX (BMI) OF 34.0 TO 34.9 IN ADULT: Status: ACTIVE | Noted: 2024-08-14

## 2024-08-14 PROCEDURE — 99213 OFFICE O/P EST LOW 20 MIN: CPT | Performed by: NURSE PRACTITIONER

## 2024-08-14 RX ORDER — TIRZEPATIDE 5 MG/.5ML
5 INJECTION, SOLUTION SUBCUTANEOUS WEEKLY
Qty: 2 ML | Refills: 0 | Status: CANCELLED | OUTPATIENT
Start: 2024-08-14

## 2024-08-14 RX ORDER — TIRZEPATIDE 5 MG/.5ML
5 INJECTION, SOLUTION SUBCUTANEOUS WEEKLY
Qty: 2 ML | Refills: 0 | Status: SHIPPED | OUTPATIENT
Start: 2024-08-14

## 2024-08-14 NOTE — PROGRESS NOTES
Ambulatory Visit  Name: Kavya Stevens      : 1965      MRN: [id number]  Encounter Provider: CONSTANCE Aguiar  Encounter Date: 2024   Encounter department: [facility]    Assessment & Plan   1. Class 1 obesity due to excess calories with serious comorbidity and body mass index (BMI) of 34.0 to 34.9 in adult  Assessment & Plan:  - Down 4 pounds since last visit.   - Continue Zepbound 5 mg weekly. Discussed side effects.  - Encourage healthy diet and regular exercise.  - Recommend follow up in 1 month.   Orders:  -     tirzepatide (Zepbound) 5 mg/0.5 mL auto-injector; Inject 0.5 mL (5 mg total) under the skin once a week       History of Present Illness     Patient presents to office today for 1 month follow up. She is on Zepbound for weight loss. She is down 4 pounds since last visit. She would like to stay on 5 mg dose for now. States she is getting nausea when she tries to eat. Also reports localized redness and swelling at the injection site when she gives herself the medication. Gets slightly itchy and then goes away in a few days. She denies any other concerns or complaints today.        Review of Systems   Constitutional:  Negative for fatigue and fever.   HENT:  Negative for trouble swallowing.    Eyes:  Negative for visual disturbance.   Respiratory:  Negative for cough and shortness of breath.    Cardiovascular:  Negative for chest pain and palpitations.   Gastrointestinal:  Positive for nausea. Negative for abdominal pain and blood in stool.   Endocrine: Negative for cold intolerance and heat intolerance.   Genitourinary:  Negative for difficulty urinating and dysuria.   Musculoskeletal:  Negative for gait problem.   Skin:  Negative for rash.   Neurological:  Negative for dizziness, syncope and headaches.   Hematological:  Negative for adenopathy.   Psychiatric/Behavioral:  Negative for behavioral problems.      Past Medical History:   Diagnosis Date   • Abnormal  Pap smear of cervix    • Arthritis    • Hyperlipidemia    • Raynaud disease      Past Surgical History:   Procedure Laterality Date   • BUNIONECTOMY Left    • EYE SURGERY Bilateral     laser surgery, to recorrect vision   • FINGER FRACTURE SURGERY Right 1982   • TONSILLECTOMY AND ADENOIDECTOMY       Family History   Adopted: Yes   Problem Relation Age of Onset   • Migraines Son    • Pulmonary embolism Son      Social History     Tobacco Use   • Smoking status: Never   • Smokeless tobacco: Never   Vaping Use   • Vaping status: Never Used   Substance and Sexual Activity   • Alcohol use: Yes     Alcohol/week: 4.0 standard drinks of alcohol     Types: 4 Glasses of wine per week     Comment: Usually socially   • Drug use: Never   • Sexual activity: Not Currently     Partners: Male     Current Outpatient Medications on File Prior to Visit   Medication Sig   • calcium-vitamin D (OSCAL) 250-125 MG-UNIT per tablet Take 1 tablet by mouth daily   • ciclopirox (LOPROX) 0.77 % cream Apply topically 2 (two) times a day   • glucosamine-chondroitin 500-400 MG tablet Take 1 tablet by mouth 3 (three) times a day   • ibuprofen (MOTRIN) 200 mg tablet Take 200 mg by mouth every 6 (six) hours as needed for mild pain   • Multiple Vitamin (multivitamin) tablet Take 1 tablet by mouth daily   • nystatin-triamcinolone (MYCOLOG-II) ointment Apply topically 2 (two) times a day   • Omega-3 Fatty Acids (FISH OIL PO) Take by mouth in the morning   • Turmeric 500 MG CAPS Take by mouth   • [DISCONTINUED] tirzepatide (Zepbound) 5 mg/0.5 mL auto-injector Inject 0.5 mL (5 mg total) under the skin once a week   • albuterol (PROVENTIL HFA,VENTOLIN HFA) 90 mcg/act inhaler Inhale 2 puffs every 6 (six) hours as needed for wheezing (Patient not taking: Reported on 4/23/2024)     Allergies   Allergen Reactions   • Bee Venom Anaphylaxis   • Medical Tape Itching and Rash     Holter and Telemetry patches   • Penicillins Rash     Immunization History  "  Administered Date(s) Administered   • COVID-19 MODERNA VACC 0.5 ML IM 03/17/2021, 04/14/2021, 11/04/2021   • COVID-19 Moderna mRNA Vaccine 12 Yr+ 50 mcg/0.5 mL (Spikevax) 12/20/2023   • COVID-19 PFIZER VACCINE 0.3 ML IM 10/14/2022   • COVID-19 Pfizer Vac BIVALENT Jim-sucrose 12 Yr+ IM 10/14/2022   • INFLUENZA 10/03/2011, 03/10/2014, 10/12/2016, 09/26/2019, 10/29/2020, 11/22/2021, 10/14/2022   • Influenza Quadrivalent Preservative Free 3 years and older IM 09/26/2019, 10/29/2020   • Tdap 03/29/2019   • Tuberculin Skin Test-PPD Intradermal 09/23/2021   • Zoster Vaccine Recombinant 10/30/2020, 05/01/2021     Objective     /84 (BP Location: Left arm, Patient Position: Sitting, Cuff Size: Large)   Pulse 62   Temp 98.5 °F (36.9 °C) (Tympanic)   Resp 16   Ht 5' 5\" (1.651 m)   Wt 94.8 kg (209 lb)   SpO2 97%   BMI 34.78 kg/m²     Physical Exam  Vitals and nursing note reviewed.   Constitutional:       Appearance: Normal appearance. She is well-developed.   HENT:      Head: Normocephalic and atraumatic.      Right Ear: External ear normal.      Left Ear: External ear normal.   Eyes:      Conjunctiva/sclera: Conjunctivae normal.   Cardiovascular:      Rate and Rhythm: Normal rate and regular rhythm.      Heart sounds: Normal heart sounds.   Pulmonary:      Effort: Pulmonary effort is normal.      Breath sounds: Normal breath sounds.   Musculoskeletal:         General: Normal range of motion.      Cervical back: Normal range of motion.   Skin:     General: Skin is warm and dry.   Neurological:      Mental Status: She is alert and oriented to person, place, and time.   Psychiatric:         Mood and Affect: Mood normal.         Behavior: Behavior normal.         "

## 2024-08-14 NOTE — TELEPHONE ENCOUNTER
Patient called stating her Zepbound was sent to the [facility] when she has been using the [facility] in [place] for this . Please cancel [facility] order and send to [facility] for patient to  .

## 2024-08-14 NOTE — ASSESSMENT & PLAN NOTE
- Down 4 pounds since last visit.   - Continue Zepbound 5 mg weekly. Discussed side effects.  - Encourage healthy diet and regular exercise.  - Recommend follow up in 1 month.

## 2024-08-14 NOTE — TELEPHONE ENCOUNTER
Pt called, she has an appointment with the provider today at 3 p.m.   prior to her visit, please cancel the medication that was sent to [facility] - resend to [facility] pharmacy?    Medication: tirzepatide (Zepbound) 5 mg/0.5 mL auto-injector     Dose/Frequency:     Inject 0.5 mL (5 mg total) under the skin once a week       Quantity: 2ml    Pharmacy: [facility]    Office:   [x] PCP/Provider - CONSTANCE Aguiar   [] Speciality/Provider -     Does the patient have enough for 3 days?   [] Yes   [x] No - Send as HP to POD

## 2024-08-20 ENCOUNTER — PATIENT MESSAGE (OUTPATIENT)
Dept: FAMILY MEDICINE CLINIC | Facility: CLINIC | Age: 59
End: 2024-08-20

## 2024-08-20 DIAGNOSIS — E66.01 CLASS 2 SEVERE OBESITY DUE TO EXCESS CALORIES WITH SERIOUS COMORBIDITY AND BODY MASS INDEX (BMI) OF 35.0 TO 35.9 IN ADULT (HCC): Primary | ICD-10-CM

## 2024-08-20 RX ORDER — TIRZEPATIDE 7.5 MG/.5ML
7.5 INJECTION, SOLUTION SUBCUTANEOUS WEEKLY
Qty: 2 ML | Refills: 0 | Status: SHIPPED | OUTPATIENT
Start: 2024-08-20

## 2024-08-21 ENCOUNTER — PROCEDURE VISIT (OUTPATIENT)
Dept: OBGYN CLINIC | Facility: MEDICAL CENTER | Age: 59
End: 2024-08-21
Payer: COMMERCIAL

## 2024-08-21 VITALS
DIASTOLIC BLOOD PRESSURE: 60 MMHG | HEIGHT: 65 IN | BODY MASS INDEX: 34.49 KG/M2 | SYSTOLIC BLOOD PRESSURE: 116 MMHG | WEIGHT: 207 LBS

## 2024-08-21 DIAGNOSIS — R87.810 CERVICAL HIGH RISK HPV (HUMAN PAPILLOMAVIRUS) TEST POSITIVE: Primary | ICD-10-CM

## 2024-08-21 PROCEDURE — 88305 TISSUE EXAM BY PATHOLOGIST: CPT | Performed by: PATHOLOGY

## 2024-08-21 PROCEDURE — 57454 BX/CURETT OF CERVIX W/SCOPE: CPT | Performed by: OBSTETRICS & GYNECOLOGY

## 2024-08-21 PROCEDURE — 88344 IMHCHEM/IMCYTCHM EA MLT ANTB: CPT | Performed by: PATHOLOGY

## 2024-08-21 PROCEDURE — 88342 IMHCHEM/IMCYTCHM 1ST ANTB: CPT | Performed by: PATHOLOGY

## 2024-08-21 NOTE — PATIENT INSTRUCTIONS
Patient Education     Colposcopy   Why is this procedure done?   This is a procedure that your doctor uses if there are problems with your cervix. The doctor looks at your cervix using a special tool called a colposcope. This tool has a light and also makes things look bigger. This helps your doctor find abnormal cells on the cervix. Then, the doctor can take a tissue sample called a biopsy of the abnormal cells. The doctor most often suggests this test when a Pap smear is abnormal.  What will the results be?   Your doctor will talk with you about what was seen during the exam. The biopsy test results will let your doctor know what kind of changes there are in the cells and if you need more treatment.  What happens before the procedure?   Schedule this test before or after your menstrual period.  For 24 hours before the procedure do not:  Use vaginal creams or douches  Use tampons  Have sex  Your doctor will ask you about your health history. Talk to the doctor about:  All the drugs you are taking. Be sure to include all prescription and over-the-counter (OTC) drugs, and herbal supplements. Tell the doctor about any drug allergy. Bring a list of drugs you take with you. Ask about what drugs you should or should not take.  Any bleeding problems. Be sure to tell your doctor if you are taking any drugs that may cause bleeding. Some of these are warfarin, rivaroxaban, apixaban, ticagrelor, clopidogrel, ketorolac, ibuprofen, naproxen, or aspirin. Certain vitamins and herbs, such as garlic and fish oil, may also add to the risk for bleeding. You may need to stop these drugs as well. Talk to your doctor about them.  If you are pregnant or think you might be.  Empty your bladder before the exam. This may help you be comfortable during the test.  What happens during the procedure?   You will lie on an exam table and put your feet in foot holders.  Your doctor will put a special tool called a speculum into your vagina. This  helps keep your vagina open during the exam.  The doctor will position the colposcope between your legs to look closely at the cervix and vagina.  The doctor will apply a solution with a cotton ball or swab. It will show any abnormal cells.  The doctor may take tissue samples if there are abnormal cells seen. This is a biopsy. Your doctor will use a wire loop and scoop out the abnormal cells inside your cervix. You may feel some discomfort while your doctor scoops out the abnormal cells. Your doctor may apply a special paste on the site to help stop the bleeding.  Sometimes the doctor is able to remove the abnormal cells. Other times the doctor may be able to use heat or cold to get rid of the abnormal cells. If you have a large area of cells that are not normal, you may need to have treatment at another time.  The doctor will take all the tools out and will send the sample to the lab for testing.  The exam may take 10 to 15 minutes. It may take another 5 to 10 minutes to treat any abnormal cells.  What happens after the procedure?   If you had a biopsy, ask your doctor when you will get the results.  You may go home after the procedure.  What care is needed at home?   Ask your doctor what you need to do when you go home. Make sure you ask questions if you do not understand what the doctor says. This way you will know what you need to do.  If you had a biopsy, you may be sore for few days. Your doctor will tell you what drugs to take to ease the pain.  You may have bleeding and dark vaginal discharge for few days. Use sanitary pads.  After the exam, ask your doctor when it is safe to:  Start using tampons or a menstrual cup again  Bathe or douche  Go back to your regular activities like work, driving, and sex.  What follow-up care is needed?   Your doctor may ask you to make visits to the office to check on your progress. Be sure to keep these visits.  The results will help your doctor know if anything is wrong.  Together you can make a plan for more care if it is needed.  What problems could happen?   Pain  Bleeding  Infection  More likely to have a baby born early if you have repeated treatments or need to have a lot of tissue removed  When do I need to call the doctor?   Signs of infection. These include fever of 100.4°F (38°C) or higher, chills.  Very heavy bleeding  Very bad belly pain  Secretions from the vagina that smell bad  Last Reviewed Date   2021-06-07  Consumer Information Use and Disclaimer   This generalized information is a limited summary of diagnosis, treatment, and/or medication information. It is not meant to be comprehensive and should be used as a tool to help the user understand and/or assess potential diagnostic and treatment options. It does NOT include all information about conditions, treatments, medications, side effects, or risks that may apply to a specific patient. It is not intended to be medical advice or a substitute for the medical advice, diagnosis, or treatment of a health care provider based on the health care provider's examination and assessment of a patient’s specific and unique circumstances. Patients must speak with a health care provider for complete information about their health, medical questions, and treatment options, including any risks or benefits regarding use of medications. This information does not endorse any treatments or medications as safe, effective, or approved for treating a specific patient. UpToDate, Inc. and its affiliates disclaim any warranty or liability relating to this information or the use thereof. The use of this information is governed by the Terms of Use, available at https://www.[employer].com/en/know/clinical-effectiveness-terms   Copyright   Copyright © 2024 UpToDate, Inc. and its affiliates and/or licensors. All rights reserved.

## 2024-08-21 NOTE — PROGRESS NOTES
Colposcopy    Date/Time: 8/21/2024 2:30 PM    Performed by: Angela Brand MD  Authorized by: Angela Brand MD    Written consent obtained?: Yes    Risks and benefits: Risks, benefits and alternatives were discussed    Consent given by:  Patient  Time Out:     Time out: Immediately prior to the procedure a time out was called      Time out performed at:  8/21/2024 3:03 PM  Patient's understanding of procedure matches consent: Yes    Procedure consent matches procedure scheduled: Yes    Relevant documents present and verified: Yes    Test results available and properly labeled: Yes    Patient identity confirmed:  Verbally with patient  Pre-procedure:     Premeds:  Ibuprofen    Prepped with: acetic acid    Indication:     Indications: HPV+  Procedure:     Procedure: Colposcopy w/ cervical biopsy and ECC      Under satisfactory analgesia the patient was prepped and draped in the dorsal lithotomy position: yes      [place] speculum was placed in the vagina: yes      Under colposcopic examination the transition zone was seen in entirety: yes      Endocervix was curetted using a Kevorkian curette: yes      Cervical biopsy performed with a cervical biopsy punch: yes (2 and 7 o'clock)      Monsel's solution was applied: yes      Allis/Tenaculum removed and cervix homostatic: yes      Specimen(s) to pathology: yes    Post-procedure:     Findings: White epithelium      Patient tolerance of procedure:  Tolerated well, no immediate complications

## 2024-08-23 PROBLEM — Z00.00 HEALTHCARE MAINTENANCE: Status: RESOLVED | Noted: 2024-07-24 | Resolved: 2024-08-23

## 2024-08-27 ENCOUNTER — HOSPITAL ENCOUNTER (OUTPATIENT)
Dept: MAMMOGRAPHY | Facility: MEDICAL CENTER | Age: 59
Discharge: HOME | End: 2024-08-27
Payer: COMMERCIAL

## 2024-08-27 VITALS — BODY MASS INDEX: 34.49 KG/M2 | WEIGHT: 207.01 LBS | HEIGHT: 65 IN

## 2024-08-27 DIAGNOSIS — Z12.31 BREAST CANCER SCREENING BY MAMMOGRAM: ICD-10-CM

## 2024-08-27 PROCEDURE — 77067 SCR MAMMO BI INCL CAD: CPT

## 2024-08-27 PROCEDURE — 88344 IMHCHEM/IMCYTCHM EA MLT ANTB: CPT | Performed by: PATHOLOGY

## 2024-08-27 PROCEDURE — 88305 TISSUE EXAM BY PATHOLOGIST: CPT | Performed by: PATHOLOGY

## 2024-08-27 PROCEDURE — 77063 BREAST TOMOSYNTHESIS BI: CPT

## 2024-08-27 PROCEDURE — 88342 IMHCHEM/IMCYTCHM 1ST ANTB: CPT | Performed by: PATHOLOGY

## 2024-09-09 ENCOUNTER — TELEMEDICINE (OUTPATIENT)
Dept: OTHER | Facility: HOSPITAL | Age: 59
End: 2024-09-09
Payer: COMMERCIAL

## 2024-09-09 VITALS — OXYGEN SATURATION: 97 % | TEMPERATURE: 100 F

## 2024-09-09 DIAGNOSIS — U07.1 COVID: Primary | ICD-10-CM

## 2024-09-09 PROCEDURE — 99213 OFFICE O/P EST LOW 20 MIN: CPT | Performed by: NURSE PRACTITIONER

## 2024-09-09 RX ORDER — NIRMATRELVIR AND RITONAVIR 300-100 MG
3 KIT ORAL 2 TIMES DAILY
Qty: 30 TABLET | Refills: 0 | Status: SHIPPED | OUTPATIENT
Start: 2024-09-09 | End: 2024-09-14

## 2024-09-09 NOTE — PROGRESS NOTES
Required Documentation:  Encounter provider: CONSTANCE Gaona    Identify all parties in room with patient during virtual visit:  No one else    The patient was identified by name and date of birth. Kavya Stevens was informed that this is a telemedicine visit and that the visit is being conducted through the Epic Embedded platform. She agrees to proceed..  My office door was closed. No one else was in the room.  She acknowledged consent and understanding of privacy and security of the video platform. The patient has agreed to participate and understands they can discontinue the visit at any time.    Verification of patient location:  Patient is located at Home in the following state in which I hold an active license [place]    Patient is aware this is a billable service.     Reason for visit is   Chief Complaint   Patient presents with    COVID-19     Home test positive    Generalized Body Aches    Fever    Cough    Chest Congestion    Chills     For 3 days        Assessment/Plan:    Kavya was seen today for covid-19, generalized body aches, fever, cough, chest congestion and chills.    Diagnoses and all orders for this visit:    COVID  -     nirmatrelvir & ritonavir (Paxlovid, 300/100,) tablet therapy pack; Take 3 tablets by mouth 2 (two) times a day for 5 days Take 2 nirmatrelvir tablets + 1 ritonavir tablet together per dose      Patient Instructions   Rest and drink extra fluids.  Start paxlovid.  Discussed side effects.  OTC cough and cold medication as needed.  Vitamin C, Vitamin D and multivitamin.  Tylenol or motrin as needed.  Stay home until fever free until fever free for 24 hours and symptoms are improving.  Go to ER with chest pain, sob or difficulty breathing.   Subjective  This a 59 year old female here today for video visit.  She started with cough and tickle in throat 2 days ago.  She states yesterday she felt poorly.  She felt tired and fatigued.  She states then in the evening she started with  chills and had temp 101.  She took motrin and went to bed.  She woke up this AM and took a covid test which was positive.  She is having some congestion.  She does have a cough.  No chest pain or sob.   She has been using mucinex severe cold and flu which is helping.  She is drinking fluids.             Past Medical History:   Diagnosis Date    Abnormal Pap smear of cervix     Arthritis     Hyperlipidemia     Raynaud disease        Past Surgical History:   Procedure Laterality Date    BUNIONECTOMY Left     EYE SURGERY Bilateral     laser surgery, to recorrect vision    FINGER FRACTURE SURGERY Right 1982    TONSILLECTOMY AND ADENOIDECTOMY          Allergies   Allergen Reactions    Bee Venom Anaphylaxis    Medical Tape Itching and Rash     Holter and Telemetry patches    Penicillins Rash       Review of Systems   Constitutional:  Positive for activity change, chills, fatigue and fever.   HENT:  Positive for congestion, rhinorrhea, sinus pressure and sinus pain.    Respiratory:  Positive for cough.    Cardiovascular: Negative.    Neurological: Negative.    Psychiatric/Behavioral: Negative.         Video Exam    Vitals:    09/09/24 0905   Temp: 100 °F (37.8 °C)   SpO2: 97%       Physical Exam  Constitutional:       General: She is not in acute distress.     Appearance: Normal appearance. She is not ill-appearing or toxic-appearing.   HENT:      Nose: Congestion present.   Pulmonary:      Effort: Pulmonary effort is normal. No respiratory distress.      Comments: No audible wheezing  Able to speak in full sentences.   Neurological:      Mental Status: She is alert and oriented to person, place, and time.   Psychiatric:         Mood and Affect: Mood normal.         Behavior: Behavior normal.         Thought Content: Thought content normal.         Judgment: Judgment normal.         Visit Time  Total Visit Duration: 10 minutes

## 2024-09-09 NOTE — LETTER
September 9, 2024     Patient: Kavya Stevens  YOB: 1965  Date of Visit: 9/9/2024 September 9, 2024     Patient: Kavya Stevens   YOB: 1965   Date of Visit: 9/9/2024       To Whom it May Concern:    Kavya Stevens is under my professional care. She was seen virtually on 9/9/2024. She may return to work on when fever free for 24 hours without any medication and symptoms are improving .    If you have any questions or concerns, please don't hesitate to call.         Sincerely,          CONSTANCE Gaona        CC: No Recipients

## 2024-09-09 NOTE — PATIENT INSTRUCTIONS
Rest and drink extra fluids.  Start paxlovid.  Discussed side effects.  OTC cough and cold medication as needed.  Vitamin C, Vitamin D and multivitamin.  Tylenol or motrin as needed.  Stay home until fever free until fever free for 24 hours and symptoms are improving.  Go to ER with chest pain, sob or difficulty breathing.

## 2024-09-18 ENCOUNTER — OFFICE VISIT (OUTPATIENT)
Dept: FAMILY MEDICINE CLINIC | Facility: CLINIC | Age: 59
End: 2024-09-18
Payer: COMMERCIAL

## 2024-09-18 VITALS
WEIGHT: 197.4 LBS | DIASTOLIC BLOOD PRESSURE: 78 MMHG | HEART RATE: 66 BPM | TEMPERATURE: 98.9 F | BODY MASS INDEX: 32.89 KG/M2 | SYSTOLIC BLOOD PRESSURE: 118 MMHG | OXYGEN SATURATION: 98 % | HEIGHT: 65 IN | RESPIRATION RATE: 16 BRPM

## 2024-09-18 DIAGNOSIS — E66.09 CLASS 1 OBESITY DUE TO EXCESS CALORIES WITH SERIOUS COMORBIDITY AND BODY MASS INDEX (BMI) OF 32.0 TO 32.9 IN ADULT: Primary | ICD-10-CM

## 2024-09-18 PROCEDURE — 99213 OFFICE O/P EST LOW 20 MIN: CPT | Performed by: NURSE PRACTITIONER

## 2024-09-18 NOTE — ASSESSMENT & PLAN NOTE
- Down 10 pounds since last visit.   - Continue Zepbound 7.5 mg weekly. Discussed side effects.  - Encourage healthy diet and regular exercise.  - Recommend follow up in 1 month.

## 2024-09-18 NOTE — PROGRESS NOTES
Ambulatory Visit  Name: Kavya Stevens      : 1965      MRN: [id number]  Encounter Provider: CONSTANCE Aguiar  Encounter Date: 2024   Encounter department: [facility] [address] [facility]    Assessment & Plan  Class 1 obesity due to excess calories with serious comorbidity and body mass index (BMI) of 32.0 to 32.9 in adult  - Down 10 pounds since last visit.   - Continue Zepbound 7.5 mg weekly. Discussed side effects.  - Encourage healthy diet and regular exercise.  - Recommend follow up in 1 month.               History of Present Illness     Patient presents to office today for 1 month follow up. She is currently on Zepbound for weight loss. She is down 10 pounds since last visit. Just started the 7.5 mg dose. She denies any significant side effects. Denies any other concerns or complaints today.             Review of Systems   Constitutional:  Negative for fatigue and fever.   HENT:  Negative for trouble swallowing.    Eyes:  Negative for visual disturbance.   Respiratory:  Negative for cough and shortness of breath.    Cardiovascular:  Negative for chest pain and palpitations.   Gastrointestinal:  Negative for abdominal pain and blood in stool.   Endocrine: Negative for cold intolerance and heat intolerance.   Genitourinary:  Negative for difficulty urinating and dysuria.   Musculoskeletal:  Negative for gait problem.   Skin:  Negative for rash.   Neurological:  Negative for dizziness, syncope and headaches.   Hematological:  Negative for adenopathy.   Psychiatric/Behavioral:  Negative for behavioral problems.      Past Medical History:   Diagnosis Date    Abnormal Pap smear of cervix     Arthritis     Hyperlipidemia     Raynaud disease      Past Surgical History:   Procedure Laterality Date    BUNIONECTOMY Left     EYE SURGERY Bilateral     laser surgery, to recorrect vision    FINGER FRACTURE SURGERY Right 1982    TONSILLECTOMY AND ADENOIDECTOMY       Family History   Adopted: Yes    Problem Relation Age of Onset    Migraines Son     Pulmonary embolism Son      Social History     Tobacco Use    Smoking status: Never    Smokeless tobacco: Never   Vaping Use    Vaping status: Never Used   Substance and Sexual Activity    Alcohol use: Yes     Alcohol/week: 4.0 standard drinks of alcohol     Types: 4 Glasses of wine per week     Comment: Usually socially    Drug use: Never    Sexual activity: Not Currently     Partners: Male     Birth control/protection: I.U.D.     Current Outpatient Medications on File Prior to Visit   Medication Sig    calcium-vitamin D (OSCAL) 250-125 MG-UNIT per tablet Take 1 tablet by mouth daily    ciclopirox (LOPROX) 0.77 % cream Apply topically 2 (two) times a day    glucosamine-chondroitin 500-400 MG tablet Take 1 tablet by mouth 3 (three) times a day    ibuprofen (MOTRIN) 200 mg tablet Take 200 mg by mouth every 6 (six) hours as needed for mild pain    Multiple Vitamin (multivitamin) tablet Take 1 tablet by mouth daily    nystatin-triamcinolone (MYCOLOG-II) ointment Apply topically 2 (two) times a day    Omega-3 Fatty Acids (FISH OIL PO) Take by mouth in the morning    tirzepatide (Zepbound) 7.5 mg/0.5 mL auto-injector Inject 0.5 mL (7.5 mg total) under the skin once a week    Turmeric 500 MG CAPS Take by mouth    albuterol (PROVENTIL HFA,VENTOLIN HFA) 90 mcg/act inhaler Inhale 2 puffs every 6 (six) hours as needed for wheezing (Patient not taking: Reported on 4/23/2024)    [DISCONTINUED] tirzepatide (Zepbound) 5 mg/0.5 mL auto-injector Inject 0.5 mL (5 mg total) under the skin once a week (Patient not taking: Reported on 9/18/2024)     Allergies   Allergen Reactions    Bee Venom Anaphylaxis    Medical Tape Itching and Rash     Holter and Telemetry patches    Penicillins Rash     Immunization History   Administered Date(s) Administered    COVID-19 MODERNA VACC 0.5 ML IM 03/17/2021, 04/14/2021, 11/04/2021    COVID-19 Moderna mRNA Vaccine 12 Yr+ 50 mcg/0.5 mL (Spikevax)  "12/20/2023    COVID-19 PFIZER VACCINE 0.3 ML IM 10/14/2022    COVID-19 Pfizer Vac BIVALENT Jim-sucrose 12 Yr+ IM 10/14/2022    INFLUENZA 10/03/2011, 03/10/2014, 10/12/2016, 09/26/2019, 10/29/2020, 11/22/2021, 10/14/2022    Influenza Quadrivalent Preservative Free 3 years and older IM 09/26/2019, 10/29/2020    Tdap 03/29/2019    Tuberculin Skin Test-PPD Intradermal 09/23/2021    Zoster Vaccine Recombinant 10/30/2020, 05/01/2021     Objective     /78 (BP Location: Left arm, Patient Position: Sitting, Cuff Size: Large)   Pulse 66   Temp 98.9 °F (37.2 °C) (Tympanic)   Resp 16   Ht 5' 5\" (1.651 m)   Wt 89.5 kg (197 lb 6.4 oz)   LMP  (LMP Unknown)   SpO2 98%   BMI 32.85 kg/m²     Physical Exam  Vitals and nursing note reviewed.   Constitutional:       Appearance: Normal appearance. She is well-developed.   HENT:      Head: Normocephalic and atraumatic.      Right Ear: External ear normal.      Left Ear: External ear normal.   Eyes:      Conjunctiva/sclera: Conjunctivae normal.   Cardiovascular:      Rate and Rhythm: Normal rate and regular rhythm.      Heart sounds: Normal heart sounds.   Pulmonary:      Effort: Pulmonary effort is normal.      Breath sounds: Normal breath sounds.   Musculoskeletal:         General: Normal range of motion.      Cervical back: Normal range of motion.   Skin:     General: Skin is warm and dry.   Neurological:      Mental Status: She is alert and oriented to person, place, and time.   Psychiatric:         Mood and Affect: Mood normal.         Behavior: Behavior normal.         "

## 2024-10-08 DIAGNOSIS — E66.811 CLASS 1 OBESITY DUE TO EXCESS CALORIES WITH SERIOUS COMORBIDITY AND BODY MASS INDEX (BMI) OF 32.0 TO 32.9 IN ADULT: ICD-10-CM

## 2024-10-08 DIAGNOSIS — E66.09 CLASS 1 OBESITY DUE TO EXCESS CALORIES WITH SERIOUS COMORBIDITY AND BODY MASS INDEX (BMI) OF 32.0 TO 32.9 IN ADULT: ICD-10-CM

## 2024-10-08 NOTE — TELEPHONE ENCOUNTER
Patient called and would like the next does of the Zepbound sent to the [facility] pharmacy in [place].  Pls advise if we can not honor.

## 2024-10-09 DIAGNOSIS — E66.811 CLASS 1 OBESITY DUE TO EXCESS CALORIES WITH SERIOUS COMORBIDITY AND BODY MASS INDEX (BMI) OF 32.0 TO 32.9 IN ADULT: Primary | ICD-10-CM

## 2024-10-09 DIAGNOSIS — E66.09 CLASS 1 OBESITY DUE TO EXCESS CALORIES WITH SERIOUS COMORBIDITY AND BODY MASS INDEX (BMI) OF 32.0 TO 32.9 IN ADULT: Primary | ICD-10-CM

## 2024-10-09 RX ORDER — TIRZEPATIDE 10 MG/.5ML
10 INJECTION, SOLUTION SUBCUTANEOUS WEEKLY
Qty: 2 ML | Refills: 0 | Status: SHIPPED | OUTPATIENT
Start: 2024-10-09

## 2024-10-09 RX ORDER — TIRZEPATIDE 10 MG/.5ML
10 INJECTION, SOLUTION SUBCUTANEOUS WEEKLY
Qty: 2 ML | Refills: 0 | Status: SHIPPED | OUTPATIENT
Start: 2024-10-09 | End: 2024-10-09 | Stop reason: SDUPTHER

## 2024-10-09 NOTE — TELEPHONE ENCOUNTER
Pt called, medication was sent to incorrect pharmacy.  Please cancel from [facility] - resend to   [facility]    Medication:     tirzepatide (Zepbound) 10 mg/0.5 mL auto-injector       Dose/Frequency:     Inject 0.5 mL (10 mg total) under the skin once a week       Quantity: 2ml    Pharmacy: [facility]    Office:   [x] PCP/Provider - CONSTANCE Aguiar   [] Speciality/Provider -     Does the patient have enough for 3 days?   [] Yes   [x] No - Send as HP to POD

## 2024-10-16 ENCOUNTER — OFFICE VISIT (OUTPATIENT)
Dept: FAMILY MEDICINE CLINIC | Facility: CLINIC | Age: 59
End: 2024-10-16
Payer: COMMERCIAL

## 2024-10-16 VITALS
HEIGHT: 65 IN | TEMPERATURE: 97.8 F | SYSTOLIC BLOOD PRESSURE: 110 MMHG | WEIGHT: 193.8 LBS | OXYGEN SATURATION: 99 % | HEART RATE: 86 BPM | BODY MASS INDEX: 32.29 KG/M2 | RESPIRATION RATE: 16 BRPM | DIASTOLIC BLOOD PRESSURE: 72 MMHG

## 2024-10-16 DIAGNOSIS — E66.811 CLASS 1 OBESITY DUE TO EXCESS CALORIES WITH SERIOUS COMORBIDITY AND BODY MASS INDEX (BMI) OF 32.0 TO 32.9 IN ADULT: Primary | ICD-10-CM

## 2024-10-16 DIAGNOSIS — M19.072 PRIMARY OSTEOARTHRITIS OF LEFT ANKLE: ICD-10-CM

## 2024-10-16 DIAGNOSIS — E66.09 CLASS 1 OBESITY DUE TO EXCESS CALORIES WITH SERIOUS COMORBIDITY AND BODY MASS INDEX (BMI) OF 32.0 TO 32.9 IN ADULT: Primary | ICD-10-CM

## 2024-10-16 PROCEDURE — 99213 OFFICE O/P EST LOW 20 MIN: CPT | Performed by: NURSE PRACTITIONER

## 2024-10-16 NOTE — PROGRESS NOTES
Ambulatory Visit  Name: Kavya Stevens      : 1965      MRN: [id number]  Encounter Provider: CONSTANCE Aguiar  Encounter Date: 10/16/2024   Encounter department: [facility] [address] [facility]    Assessment & Plan  Class 1 obesity due to excess calories with serious comorbidity and body mass index (BMI) of 32.0 to 32.9 in adult  - Down 4 pounds since last visit.   - Continue Zepbound 10 mg weekly. Discussed side effects.  - Encourage healthy diet and regular exercise.  - Recommend follow up in 1 month.              Primary osteoarthritis of left ankle  - Continue Motrin as needed.  - Follow up with Podiatry.               History of Present Illness     Patient presents to office today for 1 month follow up. She is currently on Zepbound for weight loss. She is down 4 pounds since last visit. Complains of some hair loss but denies any other side effects from the medication. Just started taking Nutrafol. Also reports left ankle pain after being on her feet and doing a lot of walking this weekend. She does have known severe posterior and middle subtalar joint osteoarthritis. Podiatry had recommended surgery in the future. She is taking OTC Motrin as needed which provides some relief. She denies any other concerns or complaints today.            Review of Systems   Constitutional:  Negative for fatigue and fever.   HENT:  Negative for trouble swallowing.    Eyes:  Negative for visual disturbance.   Respiratory:  Negative for cough and shortness of breath.    Cardiovascular:  Negative for chest pain and palpitations.   Gastrointestinal:  Negative for abdominal pain and blood in stool.   Endocrine: Negative for cold intolerance and heat intolerance.   Genitourinary:  Negative for difficulty urinating and dysuria.   Musculoskeletal:  Positive for arthralgias (left ankle). Negative for gait problem.   Skin:  Negative for rash.   Neurological:  Negative for dizziness, syncope and headaches.    Hematological:  Negative for adenopathy.   Psychiatric/Behavioral:  Negative for behavioral problems.      Past Medical History:   Diagnosis Date    Abnormal Pap smear of cervix     Arthritis     Hyperlipidemia     Raynaud disease      Past Surgical History:   Procedure Laterality Date    BUNIONECTOMY Left     EYE SURGERY Bilateral     laser surgery, to recorrect vision    FINGER FRACTURE SURGERY Right 1982    TONSILLECTOMY AND ADENOIDECTOMY       Family History   Adopted: Yes   Problem Relation Age of Onset    Migraines Son     Pulmonary embolism Son      Social History     Tobacco Use    Smoking status: Never    Smokeless tobacco: Never   Vaping Use    Vaping status: Never Used   Substance and Sexual Activity    Alcohol use: Yes     Alcohol/week: 4.0 standard drinks of alcohol     Types: 4 Glasses of wine per week     Comment: Usually socially    Drug use: Never    Sexual activity: Not Currently     Partners: Male     Birth control/protection: I.U.D.     Current Outpatient Medications on File Prior to Visit   Medication Sig    calcium-vitamin D (OSCAL) 250-125 MG-UNIT per tablet Take 1 tablet by mouth daily    ciclopirox (LOPROX) 0.77 % cream Apply topically 2 (two) times a day    glucosamine-chondroitin 500-400 MG tablet Take 1 tablet by mouth 3 (three) times a day    Multiple Vitamin (multivitamin) tablet Take 1 tablet by mouth daily    nystatin-triamcinolone (MYCOLOG-II) ointment Apply topically 2 (two) times a day    Omega-3 Fatty Acids (FISH OIL PO) Take by mouth in the morning    tirzepatide (Zepbound) 10 mg/0.5 mL auto-injector Inject 0.5 mL (10 mg total) under the skin once a week    Turmeric 500 MG CAPS Take by mouth    albuterol (PROVENTIL HFA,VENTOLIN HFA) 90 mcg/act inhaler Inhale 2 puffs every 6 (six) hours as needed for wheezing (Patient not taking: Reported on 4/23/2024)    ibuprofen (MOTRIN) 200 mg tablet Take 200 mg by mouth every 6 (six) hours as needed for mild pain     Allergies   Allergen  "Reactions    Bee Venom Anaphylaxis    Medical Tape Itching and Rash     Holter and Telemetry patches    Penicillins Rash     Immunization History   Administered Date(s) Administered    COVID-19 MODERNA VACC 0.5 ML IM 03/17/2021, 04/14/2021, 11/04/2021    COVID-19 Moderna mRNA Vaccine 12 Yr+ 50 mcg/0.5 mL (Spikevax) 12/20/2023    COVID-19 PFIZER VACCINE 0.3 ML IM 10/14/2022    COVID-19 Pfizer Vac BIVALENT Jim-sucrose 12 Yr+ IM 10/14/2022    INFLUENZA 10/03/2011, 03/10/2014, 10/12/2016, 09/26/2019, 10/29/2020, 11/22/2021, 10/14/2022    Influenza Quadrivalent Preservative Free 3 years and older IM 09/26/2019, 10/29/2020    Tdap 03/29/2019    Tuberculin Skin Test-PPD Intradermal 09/23/2021    Zoster Vaccine Recombinant 10/30/2020, 05/01/2021     Objective     /72 (BP Location: Left arm, Patient Position: Sitting, Cuff Size: Standard)   Pulse 86   Temp 97.8 °F (36.6 °C) (Tympanic)   Resp 16   Ht 5' 5\" (1.651 m)   Wt 87.9 kg (193 lb 12.8 oz)   LMP  (LMP Unknown)   SpO2 99%   BMI 32.25 kg/m²     Physical Exam  Vitals and nursing note reviewed.   Constitutional:       Appearance: Normal appearance. She is well-developed.   HENT:      Head: Normocephalic and atraumatic.      Right Ear: External ear normal.      Left Ear: External ear normal.   Eyes:      Conjunctiva/sclera: Conjunctivae normal.   Cardiovascular:      Rate and Rhythm: Normal rate and regular rhythm.      Heart sounds: Normal heart sounds.   Pulmonary:      Effort: Pulmonary effort is normal.      Breath sounds: Normal breath sounds.   Musculoskeletal:         General: Normal range of motion.      Cervical back: Normal range of motion.   Skin:     General: Skin is warm and dry.   Neurological:      Mental Status: She is alert and oriented to person, place, and time.   Psychiatric:         Mood and Affect: Mood normal.         Behavior: Behavior normal.         "

## 2024-11-07 ENCOUNTER — TELEPHONE (OUTPATIENT)
Dept: FAMILY MEDICINE CLINIC | Facility: CLINIC | Age: 59
End: 2024-11-07

## 2024-11-07 NOTE — TELEPHONE ENCOUNTER
Patient called the RX Refill Line. Message is being forwarded to the office.     Patient is requesting a refill for the next dose of wegovy. She is currently on 10mg    How are you tolerating the medication?   [] Nausea  [] Vomiting  [] Diarrhea  [] Asymptomatic  [x] Other: states she discussed with kierra Aranaoincarina small amt of hair, mild nausea     Last visit weight:    Current weight: 188    Date of last injection: 11/6/24    How many injections do you have left:0    Would you like an increase in your dose?  [x] Yes   [] No     Please contact patient at [phone]

## 2024-11-08 DIAGNOSIS — E66.09 CLASS 1 OBESITY DUE TO EXCESS CALORIES WITH SERIOUS COMORBIDITY AND BODY MASS INDEX (BMI) OF 32.0 TO 32.9 IN ADULT: Primary | ICD-10-CM

## 2024-11-08 DIAGNOSIS — E66.811 CLASS 1 OBESITY DUE TO EXCESS CALORIES WITH SERIOUS COMORBIDITY AND BODY MASS INDEX (BMI) OF 32.0 TO 32.9 IN ADULT: Primary | ICD-10-CM

## 2024-11-08 RX ORDER — TIRZEPATIDE 12.5 MG/.5ML
12.5 INJECTION, SOLUTION SUBCUTANEOUS WEEKLY
Qty: 2 ML | Refills: 0 | Status: SHIPPED | OUTPATIENT
Start: 2024-11-08 | End: 2024-11-13

## 2024-11-13 ENCOUNTER — OFFICE VISIT (OUTPATIENT)
Dept: FAMILY MEDICINE CLINIC | Facility: CLINIC | Age: 59
End: 2024-11-13
Payer: COMMERCIAL

## 2024-11-13 VITALS
HEART RATE: 63 BPM | WEIGHT: 188 LBS | DIASTOLIC BLOOD PRESSURE: 72 MMHG | SYSTOLIC BLOOD PRESSURE: 110 MMHG | TEMPERATURE: 98.6 F | HEIGHT: 65 IN | BODY MASS INDEX: 31.32 KG/M2 | OXYGEN SATURATION: 98 % | RESPIRATION RATE: 16 BRPM

## 2024-11-13 DIAGNOSIS — E66.09 CLASS 1 OBESITY DUE TO EXCESS CALORIES WITH SERIOUS COMORBIDITY AND BODY MASS INDEX (BMI) OF 31.0 TO 31.9 IN ADULT: Primary | ICD-10-CM

## 2024-11-13 DIAGNOSIS — E66.811 CLASS 1 OBESITY DUE TO EXCESS CALORIES WITH SERIOUS COMORBIDITY AND BODY MASS INDEX (BMI) OF 31.0 TO 31.9 IN ADULT: Primary | ICD-10-CM

## 2024-11-13 PROCEDURE — 99213 OFFICE O/P EST LOW 20 MIN: CPT | Performed by: NURSE PRACTITIONER

## 2024-11-13 RX ORDER — TIRZEPATIDE 15 MG/.5ML
15 INJECTION, SOLUTION SUBCUTANEOUS WEEKLY
Qty: 6 ML | Refills: 0 | Status: SHIPPED | OUTPATIENT
Start: 2024-11-13

## 2024-11-13 NOTE — ASSESSMENT & PLAN NOTE
- Down 5 pounds since last visit.   - Continue Zepbound 12.5 mg weekly for next 3 weeks then increase to 15 mg weekly. Discussed side effects.  - Encourage healthy diet and regular exercise.  - Recommend follow up in 1 month.         Orders:    tirzepatide (Zepbound) 15 mg/0.5 mL auto-injector; Inject 0.5 mL (15 mg total) under the skin once a week

## 2024-11-13 NOTE — PROGRESS NOTES
Ambulatory Visit  Name: Kavya Stevens      : 1965      MRN: [id number]  Encounter Provider: CONSTANCE Aguiar  Encounter Date: 2024   Encounter department: [facility] [address] [facility]    Assessment & Plan  Class 1 obesity due to excess calories with serious comorbidity and body mass index (BMI) of 31.0 to 31.9 in adult  - Down 5 pounds since last visit.   - Continue Zepbound 12.5 mg weekly for next 3 weeks then increase to 15 mg weekly. Discussed side effects.  - Encourage healthy diet and regular exercise.  - Recommend follow up in 1 month.         Orders:    tirzepatide (Zepbound) 15 mg/0.5 mL auto-injector; Inject 0.5 mL (15 mg total) under the skin once a week           History of Present Illness     Patient presents to office today for 1 month follow up. Currently on Zepbound for weight loss. Took first 12.5 mg dose this week. She is down 5 pounds since last visit. Denies any significant side effects from the medication. Has complaints today of itching and slight pain in her left ear. Denies any recent illnesses or upper respiratory congestion. Denies any other concerns or complaints today.           Review of Systems   Constitutional:  Negative for fatigue and fever.   HENT:  Positive for ear pain. Negative for trouble swallowing.    Eyes:  Negative for visual disturbance.   Respiratory:  Negative for cough and shortness of breath.    Cardiovascular:  Negative for chest pain and palpitations.   Gastrointestinal:  Negative for abdominal pain and blood in stool.   Endocrine: Negative for cold intolerance and heat intolerance.   Genitourinary:  Negative for difficulty urinating and dysuria.   Musculoskeletal:  Negative for gait problem.   Skin:  Negative for rash.   Neurological:  Negative for dizziness, syncope and headaches.   Hematological:  Negative for adenopathy.   Psychiatric/Behavioral:  Negative for behavioral problems.      Past Medical History:   Diagnosis Date    Abnormal  Pap smear of cervix     Arthritis     Hyperlipidemia     Raynaud disease      Past Surgical History:   Procedure Laterality Date    BUNIONECTOMY Left     EYE SURGERY Bilateral     laser surgery, to recorrect vision    FINGER FRACTURE SURGERY Right 1982    TONSILLECTOMY AND ADENOIDECTOMY       Family History   Adopted: Yes   Problem Relation Age of Onset    Migraines Son     Pulmonary embolism Son      Social History     Tobacco Use    Smoking status: Never    Smokeless tobacco: Never   Vaping Use    Vaping status: Never Used   Substance and Sexual Activity    Alcohol use: Yes     Alcohol/week: 4.0 standard drinks of alcohol     Types: 4 Glasses of wine per week     Comment: Usually socially    Drug use: Never    Sexual activity: Not Currently     Partners: Male     Birth control/protection: I.U.D.     Current Outpatient Medications on File Prior to Visit   Medication Sig    calcium-vitamin D (OSCAL) 250-125 MG-UNIT per tablet Take 1 tablet by mouth daily    ciclopirox (LOPROX) 0.77 % cream Apply topically 2 (two) times a day    glucosamine-chondroitin 500-400 MG tablet Take 1 tablet by mouth 3 (three) times a day    ibuprofen (MOTRIN) 200 mg tablet Take 200 mg by mouth every 6 (six) hours as needed for mild pain    Multiple Vitamin (multivitamin) tablet Take 1 tablet by mouth daily    nystatin-triamcinolone (MYCOLOG-II) ointment Apply topically 2 (two) times a day    Omega-3 Fatty Acids (FISH OIL PO) Take by mouth in the morning    Turmeric 500 MG CAPS Take by mouth    [DISCONTINUED] tirzepatide (Zepbound) 12.5 mg/0.5 mL auto-injector Inject 0.5 mL (12.5 mg total) under the skin once a week    albuterol (PROVENTIL HFA,VENTOLIN HFA) 90 mcg/act inhaler Inhale 2 puffs every 6 (six) hours as needed for wheezing (Patient not taking: Reported on 4/23/2024)     Allergies   Allergen Reactions    Bee Venom Anaphylaxis    Medical Tape Itching and Rash     Holter and Telemetry patches    Penicillins Rash     Immunization  "History   Administered Date(s) Administered    COVID-19 MODERNA VACC 0.5 ML IM 03/17/2021, 04/14/2021, 11/04/2021    COVID-19 Moderna mRNA Vaccine 12 Yr+ 50 mcg/0.5 mL (Spikevax) 12/20/2023    COVID-19 PFIZER VACCINE 0.3 ML IM 10/14/2022    COVID-19 Pfizer Vac BIVALENT Jim-sucrose 12 Yr+ IM 10/14/2022    INFLUENZA 10/03/2011, 03/10/2014, 10/12/2016, 09/26/2019, 10/29/2020, 11/22/2021, 10/14/2022    Influenza Quadrivalent Preservative Free 3 years and older IM 09/26/2019, 10/29/2020    Tdap 03/29/2019    Tuberculin Skin Test-PPD Intradermal 09/23/2021    Zoster Vaccine Recombinant 10/30/2020, 05/01/2021     Objective     /72 (BP Location: Left arm, Patient Position: Sitting, Cuff Size: Large)   Pulse 63   Temp 98.6 °F (37 °C) (Tympanic)   Resp 16   Ht 5' 5\" (1.651 m)   Wt 85.3 kg (188 lb)   LMP  (LMP Unknown)   SpO2 98%   BMI 31.28 kg/m²     Physical Exam  Vitals and nursing note reviewed.   Constitutional:       Appearance: Normal appearance. She is well-developed.   HENT:      Head: Normocephalic and atraumatic.      Right Ear: Tympanic membrane, ear canal and external ear normal.      Left Ear: Tympanic membrane, ear canal and external ear normal.   Eyes:      Conjunctiva/sclera: Conjunctivae normal.   Cardiovascular:      Rate and Rhythm: Normal rate and regular rhythm.      Heart sounds: Normal heart sounds.   Pulmonary:      Effort: Pulmonary effort is normal.      Breath sounds: Normal breath sounds.   Musculoskeletal:         General: Normal range of motion.      Cervical back: Normal range of motion.   Skin:     General: Skin is warm and dry.   Neurological:      Mental Status: She is alert and oriented to person, place, and time.   Psychiatric:         Mood and Affect: Mood normal.         Behavior: Behavior normal.         "

## 2024-11-22 ENCOUNTER — OFFICE VISIT (OUTPATIENT)
Dept: OBGYN CLINIC | Facility: CLINIC | Age: 59
End: 2024-11-22
Payer: COMMERCIAL

## 2024-11-22 VITALS
WEIGHT: 188 LBS | BODY MASS INDEX: 31.32 KG/M2 | DIASTOLIC BLOOD PRESSURE: 60 MMHG | SYSTOLIC BLOOD PRESSURE: 116 MMHG | HEIGHT: 65 IN

## 2024-11-22 DIAGNOSIS — M67.431 GANGLION CYST OF WRIST, RIGHT: Primary | ICD-10-CM

## 2024-11-22 PROCEDURE — 99214 OFFICE O/P EST MOD 30 MIN: CPT | Performed by: ORTHOPAEDIC SURGERY

## 2024-11-22 RX ORDER — ACETAMINOPHEN 325 MG/1
975 TABLET ORAL ONCE
OUTPATIENT
Start: 2024-11-22 | End: 2024-11-22

## 2024-11-22 NOTE — H&P
HAND & UPPER EXTREMITY OFFICE VISIT   Referred By:  No referring provider defined for this encounter.    Chief Complaint:     Right wrist mass       Previous History:   Patient was last seen on 4/23/2024 where a Right dorsal ganglion cyst was apsirated    Interval History:  Since last visit patient states that her cyst returned the next day after the aspiration. She states it is starting to get painful when she bumps it. She would like to discuss surgical excision    Past Medical History:  Past Medical History:   Diagnosis Date    Abnormal Pap smear of cervix     Arthritis     Hyperlipidemia     Osteoarthritis 1993    Left Ankle, Both knees severe arthritis    Raynaud disease      Past Surgical History:   Procedure Laterality Date    BUNIONECTOMY Left     EYE SURGERY Bilateral     laser surgery, to recorrect vision    FINGER FRACTURE SURGERY Right 1982    TENDON REPAIR  1982    Fixed my right pinky finger    TONSILLECTOMY AND ADENOIDECTOMY       Family History   Adopted: Yes   Problem Relation Age of Onset    Migraines Son     Pulmonary embolism Son      Social History     Socioeconomic History    Marital status:      Spouse name: Not on file    Number of children: Not on file    Years of education: Not on file    Highest education level: Not on file   Occupational History    Not on file   Tobacco Use    Smoking status: Never    Smokeless tobacco: Never   Vaping Use    Vaping status: Never Used   Substance and Sexual Activity    Alcohol use: Yes     Alcohol/week: 4.0 standard drinks of alcohol     Types: 4 Glasses of wine per week     Comment: Usually socially    Drug use: Never    Sexual activity: Not Currently     Partners: Male     Birth control/protection: Post-menopausal   Other Topics Concern    Not on file   Social History Narrative    Not on file     Social Drivers of Health     Financial Resource Strain: Low Risk  (6/22/2023)    Received from [facility], [facility]  Network    Overall Financial Resource Strain (CARDIA)     Difficulty of Paying Living Expenses: Not very hard   Food Insecurity: No Food Insecurity (6/22/2023)    [facility]    Hunger Vital Sign     Worried About Running Out of Food in the Last Year: Never true     Ran Out of Food in the Last Year: Never true   Transportation Needs: No Transportation Needs (6/22/2023)    Received from [facility], [facility]    PRAPARE - Transportation     Lack of Transportation (Medical): No     Lack of Transportation (Non-Medical): No   Physical Activity: Not on file   Stress: No Stress Concern Present (6/22/2023)    Received from [facility], [facility]    [employer] [place] [employer] - Occupational Stress Questionnaire     Feeling of Stress : Not at all   Social Connections: Moderately Isolated (6/22/2023)    Received from [facility], [facility]    Social Connection and Isolation Panel [NHANES]     Frequency of Communication with Friends and Family: More than three times a week     Frequency of Social Gatherings with Friends and Family: Once a week     Attends Lutheran Services: Never     Active Member of Clubs or Organizations: Yes     Attends Club or Organization Meetings: 1 to 4 times per year     Marital Status:    Intimate Partner Violence: Not At Risk (6/22/2023)    [facility]    Humiliation, Afraid, Rape, and Kick questionnaire     Fear of Current or Ex-Partner: No     Emotionally Abused: No     Physically Abused: No     Sexually Abused: No   Housing Stability: Low Risk  (6/22/2023)    Received from [facility], [facility]    Housing Stability Vital Sign     Unable to Pay for Housing in the Last Year: No     Number of Places Lived in the  "Last Year: 1     Unstable Housing in the Last Year: No     Scheduled Meds:  Continuous Infusions:No current facility-administered medications for this visit.    PRN Meds:.  Allergies   Allergen Reactions    Bee Venom Anaphylaxis    Medical Tape Itching and Rash     Holter and Telemetry patches    Penicillins Rash       Physical Examination:    /60   Ht 5' 5\" (1.651 m)   Wt 85.3 kg (188 lb)   LMP  (LMP Unknown)   BMI 31.28 kg/m²     Gen: A&Ox3, NAD  Cardiac: regular rate  Chest: non labored breathing  Abdomen: Non-distended      Right Upper Extremity:  Skin CDI  No obvious deformity of the shoulder, arm, elbow, forearm, wrist, hand  Palpable mass on dorsal wrist, approximately 1 cm in diameter, soft, tender  Full wrist ROM  Pain with terminal wrist extension  Sensation intact to light touch in the axillary median, ulnar, and radial nerve distributions  2+RP      Studies:  No new images obtained/reviewed        Assessment and Plan:  1. Ganglion cyst of wrist, right  Case request operating room: EXCISION GANGLION CYST - Right dorsal wrist    Case request operating room: EXCISION GANGLION CYST - Right dorsal wrist          59 y.o. female presents in follow up for the above diagnosis.   We discussed the etiology of the cyst from the radiocarpal joint.  She has failed 1 aspiration and has continued symptoms which are worsening.  The pain is now acting her quality of life.  Treatment options include continued observation, repeat aspiration or surgical excision.  We discussed that with surgical excision there is still a 10-15 % recurrence rate.  She would like to proceed with surgery.    We reviewed the risks of surgery including infection, bleeding, injury to nearby structures including the nerve, poor wound healing, stiffness, CRPS, and incomplete resolution or recurrence of symptoms. We reviewed the details of the procedure and the anticipated recovery period. All questions answered to patient's satisfaction. " Written consent obtained in the office today.     Right Dorsal wrist ganglion cyst excision  Conscious sedation    I recommend they follow up Return for Post-Op.  she expressed understanding of the plan and agreed. We encouraged them to contact our office with any questions or concerns.           Mal Zuñiga MD  Hand and Upper Extremity Surgery      *This note was dictated using Dragon voice recognition software. Please excuse any word substitutions or errors.*

## 2024-11-22 NOTE — PROGRESS NOTES
HAND & UPPER EXTREMITY OFFICE VISIT   Referred By:  No referring provider defined for this encounter.    Chief Complaint:     Right wrist mass       Previous History:   Patient was last seen on 4/23/2024 where a Right dorsal ganglion cyst was apsirated    Interval History:  Since last visit patient states that her cyst returned the next day after the aspiration. She states it is starting to get painful when she bumps it. She would like to discuss surgical excision    Past Medical History:  Past Medical History:   Diagnosis Date    Abnormal Pap smear of cervix     Arthritis     Hyperlipidemia     Osteoarthritis 1993    Left Ankle, Both knees severe arthritis    Raynaud disease      Past Surgical History:   Procedure Laterality Date    BUNIONECTOMY Left     EYE SURGERY Bilateral     laser surgery, to recorrect vision    FINGER FRACTURE SURGERY Right 1982    TENDON REPAIR  1982    Fixed my right pinky finger    TONSILLECTOMY AND ADENOIDECTOMY       Family History   Adopted: Yes   Problem Relation Age of Onset    Migraines Son     Pulmonary embolism Son      Social History     Socioeconomic History    Marital status:      Spouse name: Not on file    Number of children: Not on file    Years of education: Not on file    Highest education level: Not on file   Occupational History    Not on file   Tobacco Use    Smoking status: Never    Smokeless tobacco: Never   Vaping Use    Vaping status: Never Used   Substance and Sexual Activity    Alcohol use: Yes     Alcohol/week: 4.0 standard drinks of alcohol     Types: 4 Glasses of wine per week     Comment: Usually socially    Drug use: Never    Sexual activity: Not Currently     Partners: Male     Birth control/protection: Post-menopausal   Other Topics Concern    Not on file   Social History Narrative    Not on file     Social Drivers of Health     Financial Resource Strain: Low Risk  (6/22/2023)    Received from [facility]    Overall Financial Resource Strain (CARDIA)     Difficulty of Paying Living Expenses: Not very hard   Food Insecurity: No Food Insecurity (6/22/2023)    Received from [facility], [facility]    Hunger Vital Sign     Worried About Running Out of Food in the Last Year: Never true     Ran Out of Food in the Last Year: Never true   Transportation Needs: No Transportation Needs (6/22/2023)    Received from [facility], [facility]    PRAPARE - Transportation     Lack of Transportation (Medical): No     Lack of Transportation (Non-Medical): No   Physical Activity: Not on file   Stress: No Stress Concern Present (6/22/2023)    Received from [facility], [facility]    [employer] [employer] - Occupational Stress Questionnaire     Feeling of Stress : Not at all   Social Connections: Moderately Isolated (6/22/2023)    Received from [facility], [facility]    Social Connection and Isolation Panel [NHANES]     Frequency of Communication with Friends and Family: More than three times a week     Frequency of Social Gatherings with Friends and Family: Once a week     Attends Yazidi Services: Never     Active Member of Clubs or Organizations: Yes     Attends Club or Organization Meetings: 1 to 4 times per year     Marital Status:    Intimate Partner Violence: Not At Risk (6/22/2023)    Received from [facility], [facility]    Humiliation, Afraid, Rape, and Kick questionnaire     Fear of Current or Ex-Partner: No     Emotionally Abused: No     Physically Abused: No     Sexually Abused: No   Housing Stability: Low Risk  (6/22/2023)    Received from [facility], [facility]    Housing Stability Vital Sign     Unable to Pay for Housing in the Last Year: No     Number of Places Lived in the  "Last Year: 1     Unstable Housing in the Last Year: No     Scheduled Meds:  Continuous Infusions:No current facility-administered medications for this visit.    PRN Meds:.  Allergies   Allergen Reactions    Bee Venom Anaphylaxis    Medical Tape Itching and Rash     Holter and Telemetry patches    Penicillins Rash       Physical Examination:    /60   Ht 5' 5\" (1.651 m)   Wt 85.3 kg (188 lb)   LMP  (LMP Unknown)   BMI 31.28 kg/m²     Gen: A&Ox3, NAD  Cardiac: regular rate  Chest: non labored breathing  Abdomen: Non-distended      Right Upper Extremity:  Skin CDI  No obvious deformity of the shoulder, arm, elbow, forearm, wrist, hand  Palpable mass on dorsal wrist, approximately 1 cm in diameter, soft, tender  Full wrist ROM  Pain with terminal wrist extension  Sensation intact to light touch in the axillary median, ulnar, and radial nerve distributions  2+RP      Studies:  No new images obtained/reviewed        Assessment and Plan:  1. Ganglion cyst of wrist, right  Case request operating room: EXCISION GANGLION CYST - Right dorsal wrist    Case request operating room: EXCISION GANGLION CYST - Right dorsal wrist          59 y.o. female presents in follow up for the above diagnosis.   We discussed the etiology of the cyst from the radiocarpal joint.  She has failed 1 aspiration and has continued symptoms which are worsening.  The pain is now acting her quality of life.  Treatment options include continued observation, repeat aspiration or surgical excision.  We discussed that with surgical excision there is still a 10-15 % recurrence rate.  She would like to proceed with surgery.    We reviewed the risks of surgery including infection, bleeding, injury to nearby structures including the nerve, poor wound healing, stiffness, CRPS, and incomplete resolution or recurrence of symptoms. We reviewed the details of the procedure and the anticipated recovery period. All questions answered to patient's satisfaction. " Written consent obtained in the office today.     Right Dorsal wrist ganglion cyst excision  Conscious sedation    I recommend they follow up Return for Post-Op.  she expressed understanding of the plan and agreed. We encouraged them to contact our office with any questions or concerns.           Mal Zuñiga MD  Hand and Upper Extremity Surgery      *This note was dictated using Dragon voice recognition software. Please excuse any word substitutions or errors.*

## 2024-11-25 NOTE — PRE-PROCEDURE INSTRUCTIONS
Pre-Surgery Instructions:   Medication Instructions    calcium-vitamin D (OSCAL) 250-125 MG-UNIT per tablet Stop taking 7 days prior to surgery.    ciclopirox (LOPROX) 0.77 % cream Hold day of surgery.    glucosamine-chondroitin 500-400 MG tablet Stop taking 7 days prior to surgery.    ibuprofen (MOTRIN) 200 mg tablet Stop taking 7 days prior to surgery.    Multiple Vitamin (multivitamin) tablet Stop taking 7 days prior to surgery.    Multiple Vitamins-Minerals (Hair Skin and Nails Formula) TABS Stop taking 7 days prior to surgery.    nystatin-triamcinolone (MYCOLOG-II) ointment Hold day of surgery.    Omega-3 Fatty Acids (FISH OIL PO) Stop taking 7 days prior to surgery.    tirzepatide (Zepbound) 15 mg/0.5 mL auto-injector Stop taking 7 days prior to surgery.    Turmeric 500 MG CAPS Stop taking 7 days prior to surgery.    Medication instructions for day surgery reviewed. Please use only a sip of water to take your instructed medications. Avoid all over the counter vitamins, supplements and NSAIDS for one week prior to surgery per anesthesia guidelines. Tylenol is ok to take as needed.     You will receive a call one business day prior to surgery with an arrival time and hospital directions. If your surgery is scheduled on a Monday, the hospital will be calling you on the Friday prior to your surgery. If you have not heard from anyone by 8pm, please call the hospital supervisor through the hospital  at [phone]. ([place] [phone] or [place] [phone]).    Do not eat or drink anything after midnight the night before your surgery, including candy, mints, lifesavers, or chewing gum. Do not drink alcohol 24hrs before your surgery. Try not to smoke at least 24hrs before your surgery.       Follow the pre surgery showering instructions as listed in the “My Surgical Experience Booklet” or otherwise provided by your surgeon's office. Do not use a blade to shave the surgical area 1 week before surgery.  It is okay to use a clean electric clippers up to 24 hours before surgery. Do not apply any lotions, creams, including makeup, cologne, deodorant, or perfumes after showering on the day of your surgery. Do not use dry shampoo, hair spray, hair gel, or any type of hair products.     No contact lenses, eye make-up, or artificial eyelashes. Remove nail polish, including gel polish, and any artificial, gel, or acrylic nails if possible. Remove all jewelry including rings and body piercing jewelry.     Wear causal clothing that is easy to take on and off. Consider your type of surgery.    Keep any valuables, jewelry, piercings at home. Please bring any specially ordered equipment (sling, braces) if indicated.    Arrange for a responsible person to drive you to and from the hospital on the day of your surgery. Please confirm the visitor policy for the day of your procedure when you receive your phone call with an arrival time.     Call the surgeon's office with any new illnesses, exposures, or additional questions prior to surgery.    Please reference your “My Surgical Experience Booklet” for additional information to prepare for your upcoming surgery.

## 2024-11-26 ENCOUNTER — TELEPHONE (OUTPATIENT)
Dept: OBGYN CLINIC | Facility: HOSPITAL | Age: 59
End: 2024-11-26

## 2024-11-26 NOTE — TELEPHONE ENCOUNTER
Caller: Kavya (Patient)    Doctor: Dr. Zuñiga    Reason for call:  Patient is calling to see if she can take Nutrofol (its a vitamin for Hair Loss) she has been taking a month and a half, she needs to continue taking this in order to see a difference. If she does not have to stop this one she rather not. This has been helping this.   Please advise.    Call back#: [phone]

## 2024-11-29 ENCOUNTER — ANESTHESIA (OUTPATIENT)
Age: 59
End: 2024-11-29

## 2024-11-29 ENCOUNTER — ANESTHESIA EVENT (OUTPATIENT)
Age: 59
End: 2024-11-29

## 2024-12-02 RX ORDER — IBUPROFEN 600 MG/1
600 TABLET, FILM COATED ORAL EVERY 6 HOURS PRN
Status: CANCELLED | OUTPATIENT
Start: 2024-12-02

## 2024-12-02 RX ORDER — IBUPROFEN 800 MG/1
800 TABLET, FILM COATED ORAL EVERY 8 HOURS PRN
Qty: 30 TABLET | Refills: 0 | Status: CANCELLED | OUTPATIENT
Start: 2024-12-02

## 2024-12-02 NOTE — DISCHARGE INSTR - AVS FIRST PAGE
POST-OPERATIVE INSTRUCTIONS  Ganglion Excision    You have just undergone a ganglion cyst excision by Dr. Zuñiga in the operating room.  It is our wish that your postoperative recovery be as quick and comfortable as possible.  Please carefully review the following items that are important in your recovery.    Pain Control:  After any operation, a certain degree of pain is to be expected.  A prescription for narcotic pain medication as well as acetaminophen and/or ibuprofen has been electronically sent to your pharmacy. Do not exceed 3000 mg of Tylenol per day. This medication will relieve most of your pain but may not relieve all of the pain. Some pain is normal post operatively.     When you go home, please keep your operated arm elevated at all times (above the level of your heart.)  If you do this, your swelling will be diminished and your pain will be diminished as well.    ***You had a nerve block as part of your surgical anesthesia as well as to aid in your post-operative pain control. This nerve block may last 12-36 hrs after surgery. While your block is working, you will not be able to feel or move your operative arm and hand. Please wear your sling while the block is in place, except for changing clothes or hygiene purposes, to protect your arm. As the block wears off you will start to notice pain in your operative extremity. Please take pain medication as soon as you start to notice the block wearing off. This prevents your pain from becoming unmanageable when the block has completely worn off.      Dressing Care:  After surgery, make sure that your dressing is kept dry.  You can take a shower if you cover your arm with a plastic bag, such as a newspaper bag, and use tape or rubber bands. If your dressing gets wet you may take it off, place Band-Aids on the wound and re-cover it with sterile dressings which you can obtain at your local drug store.    Please remove your dressing down to the incision 5 days  after your surgery. For example, if your had surgery on a Monday, do this on Saturday.  If your surgery was on Thursday, do this on Tuesday.   If your dressing gets wet prior to removing it, please take if off and place something clean, or bandaids, on the wound.    Weight Bearing:  You should NOT bear weight >5lbs through your operative extremity. Do not push off using your operative extremity.     If you have a removable wrist brace you may wear that over your surgical dressing until your first follow up appointment if as you feel comfortable. It is not required to wear this splint.     It is ok to move your fingers as tolerated to prevent stiffness. You may also use your operative hand to assist with light activities of daily living such as putting on clothes, brushing your teeth and eating as tolerated    Please work on these finger range of motions exercises between now and you follow up visit:         Follow Up:  If you don't already have a scheduled postoperative appointment, please call our office for a follow-up appointment. It is best to call the day after surgery to make an appointment in 10-14 days.  At your first postoperative visit, you will be seen by either Dr. Zuñiga, his PA; or one of their associates. The sutures will be removed and you may be asked to see a hand therapist to optimize your functional result. Each of the hand therapists that you will be referred to have received special training in the care of the hand and upper extremity.    When to Call:  It is normal to see minor staining on the hand surgery dressing after surgery. If there is significant bleeding, you are advised to call the office during regular office hours to have this checked.     If you feel that you have a surgical emergency postoperatively that requires immediate attention, please call 9-1-1. In addition, any emergency can also be handled by the emergency room.      If you have questions regarding your surgery postop  that you feel requires attention, please call the office.   If this occurs after our regular office hours, there is a message with instructions to talk to the physician on call.

## 2024-12-04 ENCOUNTER — TELEPHONE (OUTPATIENT)
Age: 59
End: 2024-12-04

## 2024-12-04 DIAGNOSIS — E66.811 CLASS 1 OBESITY DUE TO EXCESS CALORIES WITH SERIOUS COMORBIDITY AND BODY MASS INDEX (BMI) OF 31.0 TO 31.9 IN ADULT: Primary | ICD-10-CM

## 2024-12-04 DIAGNOSIS — E66.09 CLASS 1 OBESITY DUE TO EXCESS CALORIES WITH SERIOUS COMORBIDITY AND BODY MASS INDEX (BMI) OF 31.0 TO 31.9 IN ADULT: Primary | ICD-10-CM

## 2024-12-04 RX ORDER — TIRZEPATIDE 12.5 MG/.5ML
12.5 INJECTION, SOLUTION SUBCUTANEOUS WEEKLY
Qty: 2 ML | Refills: 0 | Status: SHIPPED | OUTPATIENT
Start: 2024-12-04 | End: 2024-12-05

## 2024-12-04 NOTE — TELEPHONE ENCOUNTER
Patient called in regards to taking her last shot today of the zepbound and would like to know if provider can bring her down in dosage. Patient stated that some of the symptoms that she has been having with this dosage has not been good and would like to go down in dosage. Patient would like to know if this is possible and would like a call back.

## 2024-12-05 ENCOUNTER — TELEPHONE (OUTPATIENT)
Age: 59
End: 2024-12-05

## 2024-12-05 DIAGNOSIS — E66.811 CLASS 1 OBESITY DUE TO EXCESS CALORIES WITH SERIOUS COMORBIDITY AND BODY MASS INDEX (BMI) OF 31.0 TO 31.9 IN ADULT: Primary | ICD-10-CM

## 2024-12-05 DIAGNOSIS — E66.09 CLASS 1 OBESITY DUE TO EXCESS CALORIES WITH SERIOUS COMORBIDITY AND BODY MASS INDEX (BMI) OF 31.0 TO 31.9 IN ADULT: Primary | ICD-10-CM

## 2024-12-05 RX ORDER — TIRZEPATIDE 10 MG/.5ML
10 INJECTION, SOLUTION SUBCUTANEOUS WEEKLY
Qty: 6 ML | Refills: 0 | Status: SHIPPED | OUTPATIENT
Start: 2024-12-05 | End: 2024-12-11 | Stop reason: SDUPTHER

## 2024-12-05 NOTE — TELEPHONE ENCOUNTER
Patient states her current dose of Zepbound is causing her issues with nausea, and loss or appetite. She is asking for the lower dose to be sent in for her.

## 2024-12-09 ENCOUNTER — ANESTHESIA EVENT (OUTPATIENT)
Age: 59
End: 2024-12-09

## 2024-12-09 ENCOUNTER — ANESTHESIA (OUTPATIENT)
Age: 59
End: 2024-12-09

## 2024-12-11 ENCOUNTER — OFFICE VISIT (OUTPATIENT)
Dept: FAMILY MEDICINE CLINIC | Facility: CLINIC | Age: 59
End: 2024-12-11
Payer: COMMERCIAL

## 2024-12-11 VITALS
TEMPERATURE: 97.9 F | BODY MASS INDEX: 30.82 KG/M2 | DIASTOLIC BLOOD PRESSURE: 70 MMHG | RESPIRATION RATE: 16 BRPM | HEIGHT: 65 IN | WEIGHT: 185 LBS | HEART RATE: 61 BPM | OXYGEN SATURATION: 97 % | SYSTOLIC BLOOD PRESSURE: 118 MMHG

## 2024-12-11 DIAGNOSIS — E66.811 CLASS 1 OBESITY DUE TO EXCESS CALORIES WITH SERIOUS COMORBIDITY AND BODY MASS INDEX (BMI) OF 30.0 TO 30.9 IN ADULT: Primary | ICD-10-CM

## 2024-12-11 DIAGNOSIS — Z13.1 DIABETES MELLITUS SCREENING: ICD-10-CM

## 2024-12-11 DIAGNOSIS — E66.09 CLASS 1 OBESITY DUE TO EXCESS CALORIES WITH SERIOUS COMORBIDITY AND BODY MASS INDEX (BMI) OF 30.0 TO 30.9 IN ADULT: Primary | ICD-10-CM

## 2024-12-11 DIAGNOSIS — E78.5 HYPERLIPIDEMIA, UNSPECIFIED HYPERLIPIDEMIA TYPE: ICD-10-CM

## 2024-12-11 PROCEDURE — 99214 OFFICE O/P EST MOD 30 MIN: CPT | Performed by: NURSE PRACTITIONER

## 2024-12-11 RX ORDER — TIRZEPATIDE 10 MG/.5ML
10 INJECTION, SOLUTION SUBCUTANEOUS WEEKLY
Qty: 6 ML | Refills: 0 | Status: SHIPPED | OUTPATIENT
Start: 2024-12-11 | End: 2025-03-11

## 2024-12-11 NOTE — ASSESSMENT & PLAN NOTE
Component      Latest Ref Rng 6/17/2023 4/20/2024   Cholesterol      See Comment mg/dL 238 (H)  229 (H)    Triglycerides      See Comment mg/dL 116  88    HDL      >=50 mg/dL 77  68    LDL Calculated      0 - 100 mg/dL 138 (H)  143 (H)       -Not well-controlled.  - Encourage healthy diet, regular exercise, and continued weight loss. She has lost 69 pounds. Suspect lipid panel will improve.  - Repeat lipid panel prior to next follow up in 3 months.  Orders:  •  Lipid Panel with Direct LDL reflex; Future

## 2024-12-11 NOTE — ASSESSMENT & PLAN NOTE
- Down 3 pounds since last visit.  - Continue Zepbound 10 mg weekly. Discussed side effects.  - Encourage healthy diet and regular exercise.  - Recommend follow up in 3 months.     Orders:  •  tirzepatide (Zepbound) 10 mg/0.5 mL auto-injector; Inject 0.5 mL (10 mg total) under the skin once a week  •  CBC and differential; Future  •  Comprehensive metabolic panel; Future  •  Hemoglobin A1C; Future  •  Lipid Panel with Direct LDL reflex; Future  •  TSH, 3rd generation with Free T4 reflex; Future

## 2024-12-11 NOTE — PROGRESS NOTES
Name: Kavya Stevens      : 1965      MRN: [id number]  Encounter Provider: CONSTANCE Aguiar  Encounter Date: 2024   Encounter department: [facility]    Assessment & Plan  Class 1 obesity due to excess calories with serious comorbidity and body mass index (BMI) of 30.0 to 30.9 in adult  - Down 3 pounds since last visit.  - Continue Zepbound 10 mg weekly. Discussed side effects.  - Encourage healthy diet and regular exercise.  - Recommend follow up in 3 months.     Orders:  •  tirzepatide (Zepbound) 10 mg/0.5 mL auto-injector; Inject 0.5 mL (10 mg total) under the skin once a week  •  CBC and differential; Future  •  Comprehensive metabolic panel; Future  •  Hemoglobin A1C; Future  •  Lipid Panel with Direct LDL reflex; Future  •  TSH, 3rd generation with Free T4 reflex; Future    Hyperlipidemia, unspecified hyperlipidemia type  Component      Latest Ref Rng 2023   Cholesterol      See Comment mg/dL 238 (H)  229 (H)    Triglycerides      See Comment mg/dL 116  88    HDL      >=50 mg/dL 77  68    LDL Calculated      0 - 100 mg/dL 138 (H)  143 (H)       -Not well-controlled.  - Encourage healthy diet, regular exercise, and continued weight loss. She has lost 69 pounds. Suspect lipid panel will improve.  - Repeat lipid panel prior to next follow up in 3 months.  Orders:  •  Lipid Panel with Direct LDL reflex; Future    Diabetes mellitus screening    Orders:  •  Hemoglobin A1C; Future         History of Present Illness     Patient presents to office today for follow up. She is currently on Zepbound for weight loss. She was on 12.5 mg dose but experienced increased nausea and inability to eat. She decreased to the 10 mg dose and is tolerating that well. She is down 3 pounds since last visit. She is down a total of 69 pounds since March. She denies any other concerns or complaints today.          Review of Systems   Constitutional:  Negative for fatigue and  fever.   HENT:  Negative for trouble swallowing.    Eyes:  Negative for visual disturbance.   Respiratory:  Negative for cough and shortness of breath.    Cardiovascular:  Negative for chest pain and palpitations.   Gastrointestinal:  Negative for abdominal pain and blood in stool.   Endocrine: Negative for cold intolerance and heat intolerance.   Genitourinary:  Negative for difficulty urinating and dysuria.   Musculoskeletal:  Negative for gait problem.   Skin:  Negative for rash.   Neurological:  Negative for dizziness, syncope and headaches.   Hematological:  Negative for adenopathy.   Psychiatric/Behavioral:  Negative for behavioral problems.      Past Medical History:   Diagnosis Date   • Abnormal Pap smear of cervix    • Arthritis    • Hyperlipidemia    • Osteoarthritis 1993    Left Ankle, Both knees severe arthritis   • Raynaud disease      Past Surgical History:   Procedure Laterality Date   • BUNIONECTOMY Left    • EYE SURGERY Bilateral     laser surgery, to recorrect vision   • FINGER FRACTURE SURGERY Right 1982   • TENDON REPAIR  1982    Fixed my right pinky finger   • TONSILLECTOMY AND ADENOIDECTOMY       Family History   Adopted: Yes   Problem Relation Age of Onset   • Migraines Son    • Pulmonary embolism Son      Social History     Tobacco Use   • Smoking status: Never   • Smokeless tobacco: Never   Vaping Use   • Vaping status: Never Used   Substance and Sexual Activity   • Alcohol use: Not Currently   • Drug use: Never   • Sexual activity: Not Currently     Partners: Male     Birth control/protection: Post-menopausal     Current Outpatient Medications on File Prior to Visit   Medication Sig   • albuterol (PROVENTIL HFA,VENTOLIN HFA) 90 mcg/act inhaler Inhale 2 puffs every 6 (six) hours as needed for wheezing   • calcium-vitamin D (OSCAL) 250-125 MG-UNIT per tablet Take 1 tablet by mouth daily   • ciclopirox (LOPROX) 0.77 % cream Apply topically 2 (two) times a day   • glucosamine-chondroitin  "500-400 MG tablet Take 1 tablet by mouth 3 (three) times a day   • ibuprofen (MOTRIN) 200 mg tablet Take 200 mg by mouth every 6 (six) hours as needed for mild pain   • Multiple Vitamin (multivitamin) tablet Take 1 tablet by mouth daily   • Multiple Vitamins-Minerals (Hair Skin and Nails Formula) TABS Take by mouth in the morning   • nystatin-triamcinolone (MYCOLOG-II) ointment Apply topically 2 (two) times a day   • Omega-3 Fatty Acids (FISH OIL PO) Take by mouth in the morning   • Turmeric 500 MG CAPS Take by mouth in the morning   • [DISCONTINUED] tirzepatide (Zepbound) 10 mg/0.5 mL auto-injector Inject 0.5 mL (10 mg total) under the skin once a week     Allergies   Allergen Reactions   • Bee Venom Anaphylaxis   • Medical Tape Itching and Rash     Holter and Telemetry patches   • Penicillins Rash     Immunization History   Administered Date(s) Administered   • COVID-19 MODERNA VACC 0.5 ML IM 03/17/2021, 04/14/2021, 11/04/2021   • COVID-19 Moderna mRNA Vaccine 12 Yr+ 50 mcg/0.5 mL (Spikevax) 12/20/2023   • COVID-19 PFIZER VACCINE 0.3 ML IM 10/14/2022   • COVID-19 Pfizer Vac BIVALENT Jim-sucrose 12 Yr+ IM 10/14/2022   • INFLUENZA 10/03/2011, 03/10/2014, 10/12/2016, 09/26/2019, 10/29/2020, 11/22/2021, 10/14/2022   • Influenza Quadrivalent Preservative Free 3 years and older IM 09/26/2019, 10/29/2020   • Tdap 03/29/2019   • Tuberculin Skin Test-PPD Intradermal 09/23/2021   • Zoster Vaccine Recombinant 10/30/2020, 05/01/2021     Objective   /70 (BP Location: Left arm, Patient Position: Sitting, Cuff Size: Adult)   Pulse 61   Temp 97.9 °F (36.6 °C) (Tympanic)   Resp 16   Ht 5' 5\" (1.651 m)   Wt 83.9 kg (185 lb)   LMP  (LMP Unknown)   SpO2 97%   BMI 30.79 kg/m²     Physical Exam  Vitals and nursing note reviewed.   Constitutional:       Appearance: Normal appearance. She is well-developed.   HENT:      Head: Normocephalic and atraumatic.      Right Ear: External ear normal.      Left Ear: External ear " normal.   Eyes:      Conjunctiva/sclera: Conjunctivae normal.   Cardiovascular:      Rate and Rhythm: Normal rate and regular rhythm.      Heart sounds: Normal heart sounds.   Pulmonary:      Effort: Pulmonary effort is normal.      Breath sounds: Normal breath sounds.   Musculoskeletal:         General: Normal range of motion.      Cervical back: Normal range of motion.   Skin:     General: Skin is warm and dry.   Neurological:      Mental Status: She is alert and oriented to person, place, and time.   Psychiatric:         Mood and Affect: Mood normal.         Behavior: Behavior normal.

## 2024-12-23 ENCOUNTER — ANESTHESIA (OUTPATIENT)
Age: 59
End: 2024-12-23
Payer: COMMERCIAL

## 2024-12-23 ENCOUNTER — ANESTHESIA EVENT (OUTPATIENT)
Age: 59
End: 2024-12-23
Payer: COMMERCIAL

## 2024-12-23 ENCOUNTER — HOSPITAL ENCOUNTER (OUTPATIENT)
Age: 59
Setting detail: OUTPATIENT SURGERY
Discharge: HOME | End: 2024-12-23
Attending: ORTHOPAEDIC SURGERY | Admitting: ORTHOPAEDIC SURGERY
Payer: COMMERCIAL

## 2024-12-23 VITALS
BODY MASS INDEX: 30.79 KG/M2 | HEIGHT: 65 IN | HEART RATE: 66 BPM | TEMPERATURE: 98 F | SYSTOLIC BLOOD PRESSURE: 125 MMHG | OXYGEN SATURATION: 99 % | RESPIRATION RATE: 18 BRPM | DIASTOLIC BLOOD PRESSURE: 67 MMHG

## 2024-12-23 DIAGNOSIS — Z47.89 AFTERCARE FOLLOWING SURGERY OF THE MUSCULOSKELETAL SYSTEM: ICD-10-CM

## 2024-12-23 DIAGNOSIS — M67.431 GANGLION CYST OF WRIST, RIGHT: Primary | ICD-10-CM

## 2024-12-23 PROCEDURE — 88304 TISSUE EXAM BY PATHOLOGIST: CPT | Performed by: PATHOLOGY

## 2024-12-23 PROCEDURE — NC001 PR NO CHARGE: Performed by: ORTHOPAEDIC SURGERY

## 2024-12-23 PROCEDURE — 25112 REREMOVE WRIST TENDON LESION: CPT

## 2024-12-23 PROCEDURE — 25112 REREMOVE WRIST TENDON LESION: CPT | Performed by: ORTHOPAEDIC SURGERY

## 2024-12-23 RX ORDER — FENTANYL CITRATE/PF 50 MCG/ML
25 SYRINGE (ML) INJECTION
Status: DISCONTINUED | OUTPATIENT
Start: 2024-12-23 | End: 2024-12-23 | Stop reason: HOSPADM

## 2024-12-23 RX ORDER — SODIUM CHLORIDE, SODIUM LACTATE, POTASSIUM CHLORIDE, CALCIUM CHLORIDE 600; 310; 30; 20 MG/100ML; MG/100ML; MG/100ML; MG/100ML
125 INJECTION, SOLUTION INTRAVENOUS CONTINUOUS
Status: DISCONTINUED | OUTPATIENT
Start: 2024-12-23 | End: 2024-12-23 | Stop reason: HOSPADM

## 2024-12-23 RX ORDER — ACETAMINOPHEN 325 MG/1
975 TABLET ORAL ONCE
Status: COMPLETED | OUTPATIENT
Start: 2024-12-23 | End: 2024-12-23

## 2024-12-23 RX ORDER — ACETAMINOPHEN 325 MG/1
650 TABLET ORAL EVERY 6 HOURS PRN
Status: DISCONTINUED | OUTPATIENT
Start: 2024-12-23 | End: 2024-12-23 | Stop reason: HOSPADM

## 2024-12-23 RX ORDER — OXYCODONE HYDROCHLORIDE 5 MG/1
5 TABLET ORAL EVERY 6 HOURS PRN
Status: DISCONTINUED | OUTPATIENT
Start: 2024-12-23 | End: 2024-12-23 | Stop reason: HOSPADM

## 2024-12-23 RX ORDER — ONDANSETRON 4 MG/1
4 TABLET, FILM COATED ORAL EVERY 8 HOURS PRN
Qty: 10 TABLET | Refills: 0 | Status: SHIPPED | OUTPATIENT
Start: 2024-12-23

## 2024-12-23 RX ORDER — FENTANYL CITRATE 50 UG/ML
INJECTION, SOLUTION INTRAMUSCULAR; INTRAVENOUS AS NEEDED
Status: DISCONTINUED | OUTPATIENT
Start: 2024-12-23 | End: 2024-12-23

## 2024-12-23 RX ORDER — CEFAZOLIN SODIUM 2 G/50ML
2000 SOLUTION INTRAVENOUS ONCE
Status: COMPLETED | OUTPATIENT
Start: 2024-12-23 | End: 2024-12-23

## 2024-12-23 RX ORDER — PROPOFOL 10 MG/ML
INJECTION, EMULSION INTRAVENOUS AS NEEDED
Status: DISCONTINUED | OUTPATIENT
Start: 2024-12-23 | End: 2024-12-23

## 2024-12-23 RX ORDER — ONDANSETRON 2 MG/ML
4 INJECTION INTRAMUSCULAR; INTRAVENOUS ONCE AS NEEDED
Status: DISCONTINUED | OUTPATIENT
Start: 2024-12-23 | End: 2024-12-23 | Stop reason: HOSPADM

## 2024-12-23 RX ORDER — MIDAZOLAM HYDROCHLORIDE 2 MG/2ML
INJECTION, SOLUTION INTRAMUSCULAR; INTRAVENOUS AS NEEDED
Status: DISCONTINUED | OUTPATIENT
Start: 2024-12-23 | End: 2024-12-23

## 2024-12-23 RX ORDER — PROPOFOL 10 MG/ML
INJECTION, EMULSION INTRAVENOUS CONTINUOUS PRN
Status: DISCONTINUED | OUTPATIENT
Start: 2024-12-23 | End: 2024-12-23

## 2024-12-23 RX ORDER — ONDANSETRON 2 MG/ML
INJECTION INTRAMUSCULAR; INTRAVENOUS AS NEEDED
Status: DISCONTINUED | OUTPATIENT
Start: 2024-12-23 | End: 2024-12-23

## 2024-12-23 RX ORDER — ACETAMINOPHEN 500 MG
1000 TABLET ORAL EVERY 8 HOURS PRN
Qty: 60 TABLET | Refills: 0 | Status: SHIPPED | OUTPATIENT
Start: 2024-12-23

## 2024-12-23 RX ORDER — MAGNESIUM HYDROXIDE 1200 MG/15ML
LIQUID ORAL AS NEEDED
Status: DISCONTINUED | OUTPATIENT
Start: 2024-12-23 | End: 2024-12-23 | Stop reason: HOSPADM

## 2024-12-23 RX ORDER — OXYCODONE HYDROCHLORIDE 5 MG/1
5 TABLET ORAL EVERY 6 HOURS PRN
Qty: 5 TABLET | Refills: 0 | Status: SHIPPED | OUTPATIENT
Start: 2024-12-23 | End: 2025-01-02

## 2024-12-23 RX ORDER — DOCUSATE SODIUM 100 MG/1
100 CAPSULE, LIQUID FILLED ORAL DAILY PRN
Qty: 10 CAPSULE | Refills: 0 | Status: SHIPPED | OUTPATIENT
Start: 2024-12-23

## 2024-12-23 RX ORDER — IBUPROFEN 600 MG/1
600 TABLET, FILM COATED ORAL EVERY 6 HOURS PRN
Status: DISCONTINUED | OUTPATIENT
Start: 2024-12-23 | End: 2024-12-23 | Stop reason: HOSPADM

## 2024-12-23 RX ADMIN — CEFAZOLIN SODIUM 2000 MG: 2 SOLUTION INTRAVENOUS at 09:47

## 2024-12-23 RX ADMIN — PROPOFOL 70 MCG/KG/MIN: 10 INJECTION, EMULSION INTRAVENOUS at 09:53

## 2024-12-23 RX ADMIN — FENTANYL CITRATE 25 MCG: 50 INJECTION INTRAMUSCULAR; INTRAVENOUS at 10:06

## 2024-12-23 RX ADMIN — FENTANYL CITRATE 25 MCG: 50 INJECTION INTRAMUSCULAR; INTRAVENOUS at 09:53

## 2024-12-23 RX ADMIN — SODIUM CHLORIDE, SODIUM LACTATE, POTASSIUM CHLORIDE, AND CALCIUM CHLORIDE 125 ML/HR: .6; .31; .03; .02 INJECTION, SOLUTION INTRAVENOUS at 08:35

## 2024-12-23 RX ADMIN — PROPOFOL 60 MG: 10 INJECTION, EMULSION INTRAVENOUS at 09:53

## 2024-12-23 RX ADMIN — ONDANSETRON 4 MG: 2 INJECTION INTRAMUSCULAR; INTRAVENOUS at 09:47

## 2024-12-23 RX ADMIN — PROPOFOL 20 MG: 10 INJECTION, EMULSION INTRAVENOUS at 10:06

## 2024-12-23 RX ADMIN — ACETAMINOPHEN 325MG 975 MG: 325 TABLET ORAL at 08:22

## 2024-12-23 RX ADMIN — MIDAZOLAM 2 MG: 1 INJECTION INTRAMUSCULAR; INTRAVENOUS at 09:47

## 2024-12-23 NOTE — H&P
HAND & UPPER EXTREMITY OFFICE VISIT   Referred By:  No referring provider defined for this encounter.    Chief Complaint:     Right wrist mass       Previous History:   Patient was last seen on 4/23/2024 where a Right dorsal ganglion cyst was apsirated    Interval History:  Since last visit patient states that her cyst returned the next day after the aspiration. She states it is starting to get painful when she bumps it. She would like to discuss surgical excision    Past Medical History:  Past Medical History:   Diagnosis Date    Abnormal Pap smear of cervix     Arthritis     Hyperlipidemia     Osteoarthritis 1993    Left Ankle, Both knees severe arthritis    Raynaud disease      Past Surgical History:   Procedure Laterality Date    BUNIONECTOMY Left     EYE SURGERY Bilateral     laser surgery, to recorrect vision    FINGER FRACTURE SURGERY Right 1982    TENDON REPAIR  1982    Fixed my right pinky finger    TONSILLECTOMY AND ADENOIDECTOMY       Family History   Adopted: Yes   Problem Relation Age of Onset    Migraines Son     Pulmonary embolism Son      Social History     Socioeconomic History    Marital status:      Spouse name: Not on file    Number of children: Not on file    Years of education: Not on file    Highest education level: Not on file   Occupational History    Not on file   Tobacco Use    Smoking status: Never    Smokeless tobacco: Never   Vaping Use    Vaping status: Never Used   Substance and Sexual Activity    Alcohol use: Yes     Alcohol/week: 4.0 standard drinks of alcohol     Types: 4 Glasses of wine per week     Comment: Usually socially    Drug use: Never    Sexual activity: Not Currently     Partners: Male     Birth control/protection: Post-menopausal   Other Topics Concern    Not on file   Social History Narrative    Not on file     Social Drivers of Health     Financial Resource Strain: Low Risk  (6/22/2023)    Received from [facility], [facility]    Overall Financial Resource Strain (CARDIA)     Difficulty of Paying Living Expenses: Not very hard   Food Insecurity: No Food Insecurity (6/22/2023)    Received from [facility], [facility]    Hunger Vital Sign     Worried About Running Out of Food in the Last Year: Never true     Ran Out of Food in the Last Year: Never true   Transportation Needs: No Transportation Needs (6/22/2023)    Received from [facility], [facility]    PRAPARE - Transportation     Lack of Transportation (Medical): No     Lack of Transportation (Non-Medical): No   Physical Activity: Not on file   Stress: No Stress Concern Present (6/22/2023)    Received from [facility], [facility]    [employer] - Occupational Stress Questionnaire     Feeling of Stress : Not at all   Social Connections: Moderately Isolated (6/22/2023)    Received from [facility], [facility]    Social Connection and Isolation Panel [NHANES]     Frequency of Communication with Friends and Family: More than three times a week     Frequency of Social Gatherings with Friends and Family: Once a week     Attends Judaism Services: Never     Active Member of Clubs or Organizations: Yes     Attends Club or Organization Meetings: 1 to 4 times per year     Marital Status:    Intimate Partner Violence: Not At Risk (6/22/2023)    Received from [facility], [facility]    Humiliation, Afraid, Rape, and Kick questionnaire     Fear of Current or Ex-Partner: No     Emotionally Abused: No     Physically Abused: No     Sexually Abused: No   Housing Stability: Low Risk  (6/22/2023)    Received from [facility], [facility]    Housing Stability Vital Sign     Unable to Pay for Housing in the Last Year: No     Number of Places Lived in the  "Last Year: 1     Unstable Housing in the Last Year: No     Scheduled Meds:  Continuous Infusions:No current facility-administered medications for this visit.    PRN Meds:.  Allergies   Allergen Reactions    Bee Venom Anaphylaxis    Medical Tape Itching and Rash     Holter and Telemetry patches    Penicillins Rash       Physical Examination:    /60   Ht 5' 5\" (1.651 m)   Wt 85.3 kg (188 lb)   LMP  (LMP Unknown)   BMI 31.28 kg/m²     Gen: A&Ox3, NAD  Cardiac: regular rate  Chest: non labored breathing  Abdomen: Non-distended      Right Upper Extremity:  Skin CDI  No obvious deformity of the shoulder, arm, elbow, forearm, wrist, hand  Palpable mass on dorsal wrist, approximately 1 cm in diameter, soft, tender  Full wrist ROM  Pain with terminal wrist extension  Sensation intact to light touch in the axillary median, ulnar, and radial nerve distributions  2+RP      Studies:  No new images obtained/reviewed        Assessment and Plan:  1. Ganglion cyst of wrist, right  Case request operating room: EXCISION GANGLION CYST - Right dorsal wrist    Case request operating room: EXCISION GANGLION CYST - Right dorsal wrist          59 y.o. female presents in follow up for the above diagnosis.   We discussed the etiology of the cyst from the radiocarpal joint.  She has failed 1 aspiration and has continued symptoms which are worsening.  The pain is now acting her quality of life.  Treatment options include continued observation, repeat aspiration or surgical excision.  We discussed that with surgical excision there is still a 10-15 % recurrence rate.  She would like to proceed with surgery.    We reviewed the risks of surgery including infection, bleeding, injury to nearby structures including the nerve, poor wound healing, stiffness, CRPS, and incomplete resolution or recurrence of symptoms. We reviewed the details of the procedure and the anticipated recovery period. All questions answered to patient's satisfaction. " Written consent obtained in the office today.     Right Dorsal wrist ganglion cyst excision  Conscious sedation    I recommend they follow up Return for Post-Op.  she expressed understanding of the plan and agreed. We encouraged them to contact our office with any questions or concerns.           Mal Zuñiga MD  Hand and Upper Extremity Surgery      *This note was dictated using Dragon voice recognition software. Please excuse any word substitutions or errors.*

## 2024-12-23 NOTE — OP NOTE
OPERATIVE REPORT  PATIENT NAME: Kavya Stevens    :  1965  MRN: [id number]  Pt Location:  OR ROOM 06    SURGERY DATE: 2024    Surgeons and Role:     * Mal Zuñiga MD - Primary     * Ana Maria Poon PA-C - Assisting    Preop Diagnosis:  Ganglion cyst of wrist, right [M67.431]    Post-Op Diagnosis Codes:     * Ganglion cyst of wrist, right [M67.431]    Procedure(s):  Right - EXCISION GANGLION CYST - Right dorsal wrist    Specimen(s):  ID Type Source Tests Collected by Time Destination   1 : Right wrist ganglion cyst Tissue Ganglion Cyst TISSUE EXAM Mal Zuñiga MD 2024 1006        Estimated Blood Loss:   Minimal    Drains:  * No LDAs found *    Anesthesia Type:   Conscious Sedation     Operative Indications:  Ganglion cyst of wrist, right [M67.431]    59-year-old female with a right dorsal carpal ganglion cyst which has been painful and refractory to appropriate nonoperative management including aspiration with recurrence.  She continues to have pain and symptoms affecting her quality of life.  She elected proceed with surgical excision.    Operative Findings:  Dorsal carpal ganglion cyst arising from the radiocarpal joint at the SL interval      Complications:   None    Procedure and Technique:  On the day of surgery, I met the patient in the pre-operative area. The patient was identified by name and  and marked per hospital protocol. Once again the risks benefits and alternatives of dorsal wrist ganglion excision were discussed with the patient. Patient was brought to the OR. They underwent MAC anesthetic. A tourniquet was applied. The operative extremity was prepped and draped in a standard fashion. A timeout was performed prior to incision identifying the name and laterality of the procedure which was corroborated with imaging that was present in the room.     An esmarch bandage was applied and tourniquet inflated to 250mm Hg.     A local injection of 10 cc 1:1 mix of 1%  lidocaine with epinephrine and 0.25% marcaine was performed.     A 3cm longitudinal incision was carried out at the dorsal wrist crease. Skin and subcutaneous tissue was sharply incised. The mass was identified and carefully dissected circumferentially. The extensor tendons and superficial nerves were identified and protected. The mass was noted to be cystic in nature, with an identifiable stalk originating at the dorsal SL joint.     The mass, stalk and small portion of the dorsal wrist capsule were removed en bloc and sent for pathology.     The tourniquet was deflated and wound thoroughly irrigated. The SL ligament was inspected and noted to be intact.The skin was closed with 4-0 nylon suture.     Sterile dressing was applied and a soft wrist wrap.     Post operatively the patient will be non weight bearing on the operative extremity. They will remove the dressing in 5 days. They will follow up as planned within 2 weeks. We will discuss the final pathology findings at follow up appointment.      I was present for the entire procedure., A qualified resident physician was not available., and A physician assistant was required during the procedure for retraction, tissue handling, dissection and suturing.    Patient Disposition:  PACU              SIGNATURE: Mal Zuñiga MD  DATE: December 23, 2024  TIME: 10:31 AM

## 2024-12-23 NOTE — ANESTHESIA PREPROCEDURE EVALUATION
Procedure:  EXCISION GANGLION CYST - Right dorsal wrist (Right: Wrist)    Relevant Problems   CARDIO   (+) Hyperlipidemia      MUSCULOSKELETAL   (+) Arthritis   (+) Primary osteoarthritis of left ankle      Weekly GLP1 agonist    Physical Exam    Airway    Mallampati score: II  TM Distance: >3 FB  Neck ROM: full     Dental       Cardiovascular  Cardiovascular exam normal    Pulmonary  Pulmonary exam normal     Other Findings  post-pubertal.      Anesthesia Plan  ASA Score- 2     Anesthesia Type- IV sedation with anesthesia with ASA Monitors.         Additional Monitors:     Airway Plan:            Plan Factors-    Chart reviewed. EKG reviewed.  Existing labs reviewed. Patient summary reviewed.                  Induction- intravenous.    Postoperative Plan-         Informed Consent- Anesthetic plan and risks discussed with patient.  I personally reviewed this patient with the CRNA. Discussed and agreed on the Anesthesia Plan with the CRNA..

## 2024-12-23 NOTE — ANESTHESIA POSTPROCEDURE EVALUATION
Post-Op Assessment Note    CV Status:  Stable  Pain Score: 0    Pain management: adequate       Mental Status:  Alert and awake   Hydration Status:  Euvolemic   PONV Controlled:  Controlled   Airway Patency:  Patent     Post Op Vitals Reviewed: Yes    No anethesia notable event occurred.    Staff: Anesthesiologist, CRNA           Last Filed PACU Vitals:  Vitals Value Taken Time   Temp 98 °F (36.7 °C) 12/23/24 1020   Pulse 58 12/23/24 1040   /63 12/23/24 1040   Resp 16 12/23/24 1040   SpO2 99 % 12/23/24 1040       Modified Jadiel:  Activity: 2 (12/23/2024 11:15 AM)  Respiration: 2 (12/23/2024 11:15 AM)  Circulation: 2 (12/23/2024 11:15 AM)  Consciousness: 2 (12/23/2024 11:15 AM)  Oxygen Saturation: 2 (12/23/2024 11:15 AM)  Modified Jadiel Score: 10 (12/23/2024 11:15 AM)

## 2024-12-23 NOTE — ANESTHESIA POSTPROCEDURE EVALUATION
Post-Op Assessment Note    CV Status:  Stable    Pain management: adequate       Mental Status:  Alert and awake   Hydration Status:  Euvolemic   PONV Controlled:  Controlled   Airway Patency:  Patent     Post Op Vitals Reviewed: Yes    No anethesia notable event occurred.    Staff: CRNA           Last Filed PACU Vitals:  Vitals Value Taken Time   Temp 98    Pulse 67 12/23/24 1020   /57    Resp 13 12/23/24 1020   SpO2 96 % 12/23/24 1020   Vitals shown include unfiled device data.    Modified Jadiel:  No data recorded

## 2024-12-31 PROCEDURE — 88304 TISSUE EXAM BY PATHOLOGIST: CPT | Performed by: PATHOLOGY

## 2025-01-03 ENCOUNTER — OFFICE VISIT (OUTPATIENT)
Dept: OBGYN CLINIC | Facility: CLINIC | Age: 60
End: 2025-01-03

## 2025-01-03 DIAGNOSIS — M67.431 GANGLION CYST OF WRIST, RIGHT: Primary | ICD-10-CM

## 2025-01-03 PROCEDURE — 99024 POSTOP FOLLOW-UP VISIT: CPT | Performed by: ORTHOPAEDIC SURGERY

## 2025-01-03 NOTE — PROGRESS NOTES
HAND & UPPER EXTREMITY OFFICE VISIT   Referred By:  No referring provider defined for this encounter.      Chief Complaint:     Post op visit    Surgery:  Surgery Date: 12/23/2024 - EXCISION GANGLION CYST - Right dorsal wrist - Right     History of Present Illness:   Patient presents now 11 days status post the above surgery. she reports doing well since the procedure. She did experience some numbness in the thumb the day after her procedure but this resolved the following day. Her pain has been well-controlled with tylenol and ibuprofen. She has been doing well with her finger ROM. She does report some tightness with terminal wrist flexion, but denies significant pain or limited ROM. She also reports some itching around the incision site.     Past Medical History:  Past Medical History:   Diagnosis Date    Abnormal Pap smear of cervix     Arthritis     Hyperlipidemia     Osteoarthritis 1993    Left Ankle, Both knees severe arthritis    Raynaud disease      Past Surgical History:   Procedure Laterality Date    BUNIONECTOMY Left     EYE SURGERY Bilateral     laser surgery, to recorrect vision    FINGER FRACTURE SURGERY Right 1982    PA EXCISION GANGLION WRIST DORSAL/VOLAR PRIMARY Right 12/23/2024    Procedure: EXCISION GANGLION CYST - Right dorsal wrist;  Surgeon: Mal Zuñiga MD;  Location: [facility];  Service: Orthopedics    TENDON REPAIR  1982    Fixed my right pinky finger    TONSILLECTOMY AND ADENOIDECTOMY       Family History   Adopted: Yes   Problem Relation Age of Onset    Migraines Son     Pulmonary embolism Son      Social History     Socioeconomic History    Marital status:      Spouse name: Not on file    Number of children: Not on file    Years of education: Not on file    Highest education level: Not on file   Occupational History    Not on file   Tobacco Use    Smoking status: Never    Smokeless tobacco: Never   Vaping Use    Vaping status: Never Used   Substance and Sexual Activity     Alcohol use: Not Currently    Drug use: Never    Sexual activity: Not Currently     Partners: Male     Birth control/protection: Post-menopausal   Other Topics Concern    Not on file   Social History Narrative    Not on file     Social Drivers of Health     Financial Resource Strain: Low Risk  (6/22/2023)    [facility]    Overall Financial Resource Strain (CARDIA)     Difficulty of Paying Living Expenses: Not very hard   Food Insecurity: No Food Insecurity (6/22/2023)    [facility]    Hunger Vital Sign     Worried About Running Out of Food in the Last Year: Never true     Ran Out of Food in the Last Year: Never true   Transportation Needs: No Transportation Needs (6/22/2023)    [facility]    PRAPARE - Transportation     Lack of Transportation (Medical): No     Lack of Transportation (Non-Medical): No   Physical Activity: Not on file   Stress: No Stress Concern Present (6/22/2023)    [facility]    [place] [employer] - Occupational Stress Questionnaire     Feeling of Stress : Not at all   Social Connections: Moderately Isolated (6/22/2023)    [facility]    Social Connection and Isolation Panel [NHANES]     Frequency of Communication with Friends and Family: More than three times a week     Frequency of Social Gatherings with Friends and Family: Once a week     Attends Denominational Services: Never     Active Member of Clubs or Organizations: Yes     Attends Club or Organization Meetings: 1 to 4 times per year     Marital Status:    Intimate Partner Violence: Not At Risk (6/22/2023)    [facility]    Humiliation, Afraid, Rape,  and Kick questionnaire     Fear of Current or Ex-Partner: No     Emotionally Abused: No     Physically Abused: No     Sexually Abused: No   Housing Stability: Low Risk  (6/22/2023)    [facility]    Housing Stability Vital Sign     Unable to Pay for Housing in the Last Year: No     Number of Places Lived in the Last Year: 1     Unstable Housing in the Last Year: No     Scheduled Meds:  Continuous Infusions:No current facility-administered medications for this visit.    PRN Meds:.  Allergies   Allergen Reactions    Bee Venom Anaphylaxis    Neosporin [Bacitracin-Polymyxin B] Blisters     Bad blisters per patient and rash     Medical Tape Itching and Rash     Holter and Telemetry patches    Penicillins Rash       Physical Examination:    LMP  (LMP Unknown)     Gen: A&Ox3, NAD    Right Upper Extremity:  Incision healing well without signs of infection   Sutures intact, removed  Minimal swelling around incision  Non-tender around incision  Sensation intact to light touch in the axillary median, ulnar, and radial nerve distributions  Full digital ROM  Mild tightness with terminal wrist flexion  Warm, well-perfused digits  Cap refill <2s      Studies:  12/23/24: Intraoperative pathology report consistent with ganglion cyst.    Assessment and Plan:  1. Ganglion cyst of wrist, right            59 y.o. female presents 11 days status post EXCISION GANGLION CYST - Right dorsal wrist - Right. Sutures removed in the office today. she may continue regular hygiene and apply lotions/oils and perform scar massage as needed. she may participate in all activities as tolerated without further restrictions. Encouraged patient to continue working on wrist ROM including passive flexion as tolerated. It is recommended she return to the office in 4 weeks as needed for repeat evaluation.     she expressed understanding of the plan and agreed. We encouraged them to contact our office  with any questions or concerns.         Mal Zuñiga MD  Hand and Upper Extremity Surgery          *This note was dictated using Dragon voice recognition software. Please excuse any word substitutions or errors.*      Scribe Attestation      I,:  Ana Maria Poon PA-C am acting as a scribe while in the presence of the attending physician.:       I,:  Mal Zuñiga MD personally performed the services described in this documentation    as scribed in my presence.:

## 2025-01-10 PROBLEM — Z13.1 DIABETES MELLITUS SCREENING: Status: RESOLVED | Noted: 2024-12-11 | Resolved: 2025-01-10

## 2025-02-13 ENCOUNTER — TELEPHONE (OUTPATIENT)
Age: 60
End: 2025-02-13

## 2025-02-19 NOTE — PROGRESS NOTES
Weight Management Medical Nutrition Assessment  Kavya presented for a meal planning session-1/12. Today's weight is 185#. Seen by medical provider in 2022. Did Healthy Core and VLCD program. Kavya reports it was a challenge to keep weight off. Prescribed 10mg Zepbound from PCP office. Started in July 2024 and notes start weight was 246#. Tried to increase to 12.5mg but felt nauseous and was dropped to 10mg. Expressed concerns as she reached a weight plateau for x 8 weeks. Referred by coworker to meet with TIM. She recently met with a health  through [facility] who recommended tracking intake on Nutrition Ix lisandra. Kavya averages 1000-1300kcal daily. Per food log protein intake inadequate majority of the time. This happens when she is not hungry throughout the day/evening or does not have the interest in cooking. Emphasized with patients concerns and reminded her weight loss is not a linear process. She has made great progress and lost 60#. Discussed lifestyle changes that can help with progress and encouraged protein goal of 90g daily. She will track this on her lisandra. We developed and reviewed a low calorie meal plan.    Patient seen by Medical Provider in past 6 months:  no (seen in March 2024)  Requested to schedule appointment with Medical Provider: No      Anthropometric Measurements  Start Weight (#) & Date: 229.6# 5/3/22  Current Weight (#): 185#  TBW % Change from start weight: 19.5%  Ideal Body Weight (#): 125#  Goal Weight (#): 155#  Highest: 246#  Lowest:     Weight Loss History  Previous weight loss attempts: VLCD Program, Healthy Core Program, Prescription Weight Loss Medication, keto diet, counseling with TIM, whole 30, Weight Watchers program    Food and Nutrition Related History  Wake up: 6AM   Bed Time: 10PM  Wakes up 1-2x per night.    Food Recall  Breakfast: 6AM [employer] fruit loops protein shake (160kcal, 30g protein)  1 cup coffee with half and half    Snack: 10AM gala apple OR 1 slice toast with  [employer] chicken salad  Lunch: stuffed pepper soup (ground beef, rice cauliflower, peppers, onions, tomato sauce)  Snack: pretzel thins + hummus  Dinner: bean pupusa + salsa + guacamole  Snack: skip    Takes prebiotics/probiotics and Nutrafol.     Beverages: 60-80oz water, 1 cup coffee with half and half, seltzer water  Alcohol: socially (reduced in January and gave up alcohol)  Volume of beverage intake: at least 60-80oz    Weekends: Same  Cravings: sweets  Trouble area of day: n/a    Frequency of Eating out: once every couple of weeks  Food restrictions: dislikes anchovies   Cooking: self   Food Shopping: self    Physical Activity Intake  Activity: HIIT workouts (Grow with [place])  Frequency: frequently   Physical limitations/barriers to exercise: arthritis in both knees, arthritis in ankle    Estimated Needs  Energy  SECA: BMR: n/a      X 1.3 -1000 =  [place] [employer] Energy Needs: BMR : 1624   1-2# loss weekly sedentary:  949-1449             1-2# loss weekly lightly active: 3400-5236  Maintenance calories for sedentary activity level: 1949  Protein: 68-85g      (1.2-1.5g/kg IBW)  Fluid Requirement Calculator   Total Fluid: 94oz     ([place]-Segar Method)  Free Fluid: 75oz ([place]-Segar Method - 20%)    Nutrition Diagnosis  Yes;    Overweight/obesity  related to Food and nutrition related knowledge deficit as evidenced by  BMI more than normative standard for age and sex (obesity-grade I 30-34.9)       Nutrition Intervention    Nutrition Prescription  Calories: 1,000-1,400   Protein: 90  Fluid: 67    Meal Plan (Umberto/Pro/Carb)  Breakfast: 300-400/25-30  Snack: 100-150/5-10  Lunch: 300-400/25-30  Snack: 100-150/5-10  Dinner: 300-400/25-30  Snack:    Nutrition Education:    Calorie controlled menu  Lean protein food choices  Healthy snack options  Food journaling tips      Nutrition Counseling:  Strategies: meal planning, portion sizes, healthy snack choices, hydration, fiber intake, protein intake, exercise, food  journal      Monitoring and Evaluation:  Evaluation criteria:  Energy Intake  Meet protein needs  Maintain adequate hydration  Monitor weekly weight  Meal planning/preparation  Food journal   Decreased portions at mealtimes and snacks  Physical activity     Barriers to learning:none  Readiness to change: Action:  (Changing behavior)  Comprehension: very good  Expected Compliance: very good

## 2025-02-25 ENCOUNTER — CLINICAL SUPPORT (OUTPATIENT)
Dept: BARIATRICS | Facility: CLINIC | Age: 60
End: 2025-02-25
Payer: COMMERCIAL

## 2025-02-25 VITALS — BODY MASS INDEX: 30.82 KG/M2 | WEIGHT: 185 LBS | HEIGHT: 65 IN

## 2025-02-25 DIAGNOSIS — E66.811 CLASS 1 OBESITY DUE TO EXCESS CALORIES WITH SERIOUS COMORBIDITY AND BODY MASS INDEX (BMI) OF 30.0 TO 30.9 IN ADULT: Primary | ICD-10-CM

## 2025-02-25 DIAGNOSIS — E66.09 CLASS 1 OBESITY DUE TO EXCESS CALORIES WITH SERIOUS COMORBIDITY AND BODY MASS INDEX (BMI) OF 30.0 TO 30.9 IN ADULT: Primary | ICD-10-CM

## 2025-02-25 PROCEDURE — RECHECK

## 2025-02-25 PROCEDURE — S9470 NUTRITIONAL COUNSELING, DIET: HCPCS

## 2025-02-26 ENCOUNTER — APPOINTMENT (OUTPATIENT)
Dept: RADIOLOGY | Facility: AMBULARY SURGERY CENTER | Age: 60
End: 2025-02-26
Attending: ORTHOPAEDIC SURGERY
Payer: COMMERCIAL

## 2025-02-26 ENCOUNTER — OFFICE VISIT (OUTPATIENT)
Dept: OBGYN CLINIC | Facility: CLINIC | Age: 60
End: 2025-02-26
Payer: COMMERCIAL

## 2025-02-26 VITALS — BODY MASS INDEX: 31.09 KG/M2 | HEIGHT: 65 IN | WEIGHT: 186.6 LBS

## 2025-02-26 DIAGNOSIS — Z01.89 ENCOUNTER FOR LOWER EXTREMITY COMPARISON IMAGING STUDY: ICD-10-CM

## 2025-02-26 DIAGNOSIS — M19.072 ARTHRITIS OF LEFT SUBTALAR JOINT: ICD-10-CM

## 2025-02-26 DIAGNOSIS — M25.572 PAIN, JOINT, ANKLE AND FOOT, LEFT: Primary | ICD-10-CM

## 2025-02-26 DIAGNOSIS — M25.572 PAIN, JOINT, ANKLE AND FOOT, LEFT: ICD-10-CM

## 2025-02-26 PROCEDURE — 73600 X-RAY EXAM OF ANKLE: CPT

## 2025-02-26 PROCEDURE — 73610 X-RAY EXAM OF ANKLE: CPT

## 2025-02-26 PROCEDURE — 99214 OFFICE O/P EST MOD 30 MIN: CPT | Performed by: ORTHOPAEDIC SURGERY

## 2025-02-26 RX ORDER — CHLORHEXIDINE GLUCONATE ORAL RINSE 1.2 MG/ML
15 SOLUTION DENTAL ONCE
OUTPATIENT
Start: 2025-02-26 | End: 2025-02-26

## 2025-02-26 RX ORDER — CHLORHEXIDINE GLUCONATE 40 MG/ML
SOLUTION TOPICAL DAILY PRN
OUTPATIENT
Start: 2025-02-26

## 2025-02-26 NOTE — PROGRESS NOTES
James R Lachman, M.D.  Attending, Orthopaedic Surgery  Foot and Ankle  [facility]      ORTHOPAEDIC FOOT AND ANKLE CLINIC VISIT     Assessment:     Encounter Diagnoses   Name Primary?    Pain, joint, ankle and foot, left Yes    Encounter for lower extremity comparison imaging study     Arthritis of left subtalar joint             Plan:   The patient verbalized understanding of exam findings and treatment plan. We engaged in the shared decision-making process and treatment options were discussed at length with the patient. Surgical and conservative management discussed today along with risks and benefits.  Left end stage subtalar arthritis   WBAT LLE  Discussed operative vs nonoperative treatment options. She has exhausted nonoperative treatment with injections and bracing  She would like to proceed with a subtalar fusion.   Consent reviewed and signed in the clinic.   Return in about 3 weeks (around 3/19/2025) for suture removal.    CONSENT FOR BONY PROCEDURES:   Patient understands that there is no guarantee that the surgery will relieve all of Her pain and also understands that there may be a prolonged course of protected weight-bearing status required which will restrict them from driving and other activities as discussed at today's visit. Patient recognizes that there are risks with surgery including bleeding, numbness, nerve irritation, wound complications, infection, continued pain, joint stiffness, malunion, nonunion, anesthetic complications, death, failure of procedure and possible need for further surgery. The patient understands that there is no guarantee that this surgery will relieve all of Her pain and symptoms.  Patient understands that there is no guarantee that they will return to full function after the procedure. Patient has provided informed consent for the procedure.     History of Present Illness:   Chief Complaint:   Chief Complaint   Patient presents with    Left  Ankle - Pain     Had issues with it. Use to lock. Has an extra bone in the ankle. Bone against Bone. Has had injection. Can get a fusion. Also told she can have it replaced.      Kavya Stevens is a 59 y.o. female who is being seen for left ankle pain. She ws referred by her PCP. She had a CT 9 months ago showing severe subtalar arthritis.  She denies hx of RA or trauma. She states her symptoms started 20 years ago. She has been treated with CSI, OTC bracing, NSAIDs. Her last CSI lasted 2 weeks. She was seeing Dr Jones at [facility] who recommended a subtalar arthrodesis, however she did not lose ROM in her ankle. She is asking about getting a TAA. Pain is localized at subtalar joint with minimal radiating and described as sharp and severe. Patient denies numbness, tingling or radicular pain.  Denies history of neuropathy.  Patient does smoke, does  not have diabetes and does not take blood thinners.  Patient denies family history of anesthesia complications and has not had any complications with anesthesia.     Pain/symptom timing:  Worse during the day when active  Pain/symptom context:  Worse with activites and work  Pain/symptom modifying factors:  Rest makes better, activities make worse  Pain/symptom associated signs/symptoms: none    Prior treatment   NSAIDsYes   Injections Yes   Bracing/Orthotics Yes    Physical Therapy No     Orthopedic Surgical History:   See below    Past Medical, Surgical and Social History:  Past Medical History:  has a past medical history of Abnormal Pap smear of cervix, Arthritis, Hyperlipidemia, Osteoarthritis (1993), and Raynaud disease.  Problem List: does not have any pertinent problems on file.  Past Surgical History:  has a past surgical history that includes Finger fracture surgery (Right, 1982); Tonsillectomy and adenoidectomy; Eye surgery (Bilateral); Bunionectomy (Left); Tendon repair (1982); and pr excision ganglion wrist dorsal/volar primary (Right, 12/23/2024).  Family History:  "family history includes Migraines in her son; Pulmonary embolism in her son. She was adopted.  Social History:  reports that she has never smoked. She has never used smokeless tobacco. She reports that she does not currently use alcohol. She reports that she does not use drugs.  Current Medications: has a current medication list which includes the following prescription(s): acetaminophen, albuterol, calcium-vitamin d, ciclopirox, glucosamine-chondroitin, ibuprofen, multivitamin, hair skin and nails formula, nystatin-triamcinolone, omega-3 fatty acids, zepbound, turmeric, docusate sodium, and ondansetron.  Allergies: is allergic to bee venom, neosporin [bacitracin-polymyxin b], medical tape, and penicillins.     Review of Systems:  General- denies fever/chills  HEENT- denies hearing loss or sore throat  Eyes- denies eye pain or visual disturbances, denies red eyes  Respiratory- denies cough or SOB  Cardio- denies chest pain or palpitations  GI- denies abdominal pain  Endocrine- denies urinary frequency  Urinary- denies pain with urination  Musculoskeletal- Negative except noted above  Skin- denies rashes or wounds  Neurological- denies dizziness or headache  Psychiatric- denies anxiety or difficulty concentrating    Physical Exam:   Ht 5' 5\" (1.651 m)   Wt 84.6 kg (186 lb 9.6 oz)   LMP  (LMP Unknown)   BMI 31.05 kg/m²   General/Constitutional: No apparent distress: well-nourished and well developed.  Eyes: normal ocular motion  Cardio: RRR, Normal S1S2, No m/r/g  Lymphatic: No appreciable lymphadenopathy  Respiratory: Non-labored breathing, CTA b/l no w/c/r  Vascular: No edema, swelling or tenderness, except as noted in detailed exam.  Integumentary: No impressive skin lesions present, except as noted in detailed exam.  Neuro: No ataxia or tremors noted  Psych: Normal mood and affect, oriented to person, place and time. Appropriate affect.  Musculoskeletal: Normal, except as noted in detailed exam and in " HPI.    Examination    Left    Gait Antalgic   Musculoskeletal Tender to palpation at subtalar joint    Skin Normal.      Nails Normal    Range of Motion  10 degrees dorsiflexion, 30 degrees plantarflexion  Subtalar motion: 5 degrees eversion and inversion with pain    Stability Stable    Muscle Strength 5/5 tibialis anterior  5/5 gastrocnemius-soleus  4/5 posterior tibialis  4/5 peroneal/eversion strength  5/5 EHL  5/5 FHL    Neurologic Normal    Sensation Intact to light touch throughout sural, saphenous, superficial peroneal, deep peroneal and medial/lateral plantar nerve distributions.  P[place] Maria-Eber 5.07 filament (10g) testing deferred.    Cardiovascular Brisk capillary refill < 2 seconds,intact DP and PT pulses    Special Tests None      Imaging Studies:   3 views of the left ankle were taken, reviewed and interpreted independently that demonstrate severe subtalar arthritis. Reviewed by me personally.    CT of the left ankle demonstrates Severe posterior and middle subtalar joint osteoarthritis.        James R. Lachman, MD  Foot & Ankle Surgery   Department of Orthopaedic Surgery  [facility]      I personally performed the service.    James R. Lachman, MD

## 2025-02-27 ENCOUNTER — TELEPHONE (OUTPATIENT)
Age: 60
End: 2025-02-27

## 2025-02-27 NOTE — TELEPHONE ENCOUNTER
Patient called and stated that the last vaccine for measles she has gotten was in 1992.  She wants to know if their is a booster she should be getting with everything going on?  [facility] advise

## 2025-03-03 NOTE — TELEPHONE ENCOUNTER
Pt would like to know if she needs a MMR booster with her medical problems since her last one was in 1993? Please advise

## 2025-03-04 DIAGNOSIS — Z01.84 IMMUNITY STATUS TESTING: Primary | ICD-10-CM

## 2025-03-08 ENCOUNTER — APPOINTMENT (OUTPATIENT)
Dept: LAB | Age: 60
End: 2025-03-08
Payer: COMMERCIAL

## 2025-03-08 DIAGNOSIS — Z01.84 IMMUNITY STATUS TESTING: ICD-10-CM

## 2025-03-08 LAB — RUBV IGG SERPL IA-ACNC: 83.6 IU/ML

## 2025-03-08 PROCEDURE — 86765 RUBEOLA ANTIBODY: CPT

## 2025-03-08 PROCEDURE — 86735 MUMPS ANTIBODY: CPT

## 2025-03-08 PROCEDURE — 86762 RUBELLA ANTIBODY: CPT

## 2025-03-08 PROCEDURE — 36415 COLL VENOUS BLD VENIPUNCTURE: CPT

## 2025-03-09 LAB
MEV IGG SER QL IA: 24.3 AU/ML
MEV IGG SER QL IA: POSITIVE
MUV IGG SER QL IA: 140 AU/ML
MUV IGG SER QL IA: POSITIVE

## 2025-03-12 ENCOUNTER — OFFICE VISIT (OUTPATIENT)
Dept: FAMILY MEDICINE CLINIC | Facility: CLINIC | Age: 60
End: 2025-03-12
Payer: COMMERCIAL

## 2025-03-12 VITALS
HEIGHT: 65 IN | BODY MASS INDEX: 30.29 KG/M2 | WEIGHT: 181.8 LBS | RESPIRATION RATE: 16 BRPM | OXYGEN SATURATION: 98 % | TEMPERATURE: 97.9 F | HEART RATE: 68 BPM

## 2025-03-12 DIAGNOSIS — E66.09 CLASS 1 OBESITY DUE TO EXCESS CALORIES WITH SERIOUS COMORBIDITY AND BODY MASS INDEX (BMI) OF 30.0 TO 30.9 IN ADULT: Primary | ICD-10-CM

## 2025-03-12 DIAGNOSIS — E78.5 HYPERLIPIDEMIA, UNSPECIFIED HYPERLIPIDEMIA TYPE: ICD-10-CM

## 2025-03-12 DIAGNOSIS — E66.811 CLASS 1 OBESITY DUE TO EXCESS CALORIES WITH SERIOUS COMORBIDITY AND BODY MASS INDEX (BMI) OF 30.0 TO 30.9 IN ADULT: Primary | ICD-10-CM

## 2025-03-12 DIAGNOSIS — J30.9 ALLERGIC RHINITIS, UNSPECIFIED SEASONALITY, UNSPECIFIED TRIGGER: ICD-10-CM

## 2025-03-12 DIAGNOSIS — M19.072 ARTHRITIS OF LEFT SUBTALAR JOINT: ICD-10-CM

## 2025-03-12 PROCEDURE — 99214 OFFICE O/P EST MOD 30 MIN: CPT | Performed by: NURSE PRACTITIONER

## 2025-03-12 RX ORDER — TIRZEPATIDE 10 MG/.5ML
10 INJECTION, SOLUTION SUBCUTANEOUS WEEKLY
Qty: 6 ML | Refills: 1 | Status: SHIPPED | OUTPATIENT
Start: 2025-03-12 | End: 2025-06-10

## 2025-03-12 NOTE — PROGRESS NOTES
Name: Kavya Stevens      : 1965      MRN: [id number]  Encounter Provider: CONSTANCE Aguiar  Encounter Date: 3/12/2025   Encounter department: [facility]    Assessment & Plan  Class 1 obesity due to excess calories with serious comorbidity and body mass index (BMI) of 30.0 to 30.9 in adult  - Down 4 pounds since last visit. She is down a total of 73 pounds.  - Continue Zepbound 10 mg weekly. Discussed side effects.  - Continue healthy diet and regular exercise.  - Recommend follow up in 3 months.   Orders:  •  tirzepatide (Zepbound) 10 mg/0.5 mL auto-injector; Inject 0.5 mL (10 mg total) under the skin once a week    Allergic rhinitis, unspecified seasonality, unspecified trigger  - Discussed switching from Zyrtec to either Claritin or Xyzal.  - Recommend Flonase nasal spray.        Hyperlipidemia, unspecified hyperlipidemia type  Component      Latest Ref Rng 2023   Cholesterol      See Comment mg/dL 238 (H)  229 (H)    Triglycerides      See Comment mg/dL 116  88    HDL      >=50 mg/dL 77  68    LDL Calculated      0 - 100 mg/dL 138 (H)  143 (H)       - Elevated LDL. HDL is good.  - Encourage healthy diet, regular exercise, and continued weight loss. She has lost 73 pounds. Suspect lipid panel will improve.  - Will continue to monitor fasting lipid panel.        Arthritis of left subtalar joint  - Scheduled for surgery in .  - Continue follow up with Orthopedic Surgery.             History of Present Illness     Patient presents to office today for 3 month follow up. She is taking Zepbound for weight loss. Currently on 10 mg weekly. She is down 4 pounds since her last visit here in December. She is down a total of 73 pounds since starting the medication. She is starting to exercise more and is making dietary changes. She is scheduled for ankle surgery in . Has complaints today of runny nose and postnasal drip. Taking Zyrtec. She denies any significant  concerns or complaints today.           Review of Systems   Constitutional:  Negative for fatigue and fever.   HENT:  Positive for postnasal drip and rhinorrhea. Negative for trouble swallowing.    Eyes:  Negative for visual disturbance.   Respiratory:  Negative for cough and shortness of breath.    Cardiovascular:  Negative for chest pain and palpitations.   Gastrointestinal:  Negative for abdominal pain and blood in stool.   Endocrine: Negative for cold intolerance and heat intolerance.   Genitourinary:  Negative for difficulty urinating and dysuria.   Musculoskeletal:  Negative for gait problem.   Skin:  Negative for rash.   Neurological:  Negative for dizziness, syncope and headaches.   Hematological:  Negative for adenopathy.   Psychiatric/Behavioral:  Negative for behavioral problems.      Past Medical History:   Diagnosis Date   • Abnormal Pap smear of cervix    • Arthritis    • Hyperlipidemia    • Osteoarthritis 1993    Left Ankle, Both knees severe arthritis   • Raynaud disease      Past Surgical History:   Procedure Laterality Date   • BUNIONECTOMY Left    • EYE SURGERY Bilateral     laser surgery, to recorrect vision   • FINGER FRACTURE SURGERY Right 1982   • MD EXCISION GANGLION WRIST DORSAL/VOLAR PRIMARY Right 12/23/2024    Procedure: EXCISION GANGLION CYST - Right dorsal wrist;  Surgeon: Mal Zuñiga MD;  Location:  MAIN OR;  Service: Orthopedics   • TENDON REPAIR  1982    Fixed my right pinky finger   • TONSILLECTOMY AND ADENOIDECTOMY       Family History   Adopted: Yes   Problem Relation Age of Onset   • Migraines Son    • Pulmonary embolism Son      Social History     Tobacco Use   • Smoking status: Never   • Smokeless tobacco: Never   Vaping Use   • Vaping status: Never Used   Substance and Sexual Activity   • Alcohol use: Not Currently   • Drug use: Never   • Sexual activity: Not Currently     Partners: Male     Birth control/protection: Post-menopausal     Current Outpatient  Medications on File Prior to Visit   Medication Sig   • acetaminophen (TYLENOL) 500 mg tablet Take 2 tablets (1,000 mg total) by mouth every 8 (eight) hours as needed for mild pain or moderate pain   • albuterol (PROVENTIL HFA,VENTOLIN HFA) 90 mcg/act inhaler Inhale 2 puffs every 6 (six) hours as needed for wheezing   • calcium-vitamin D (OSCAL) 250-125 MG-UNIT per tablet Take 1 tablet by mouth daily   • ciclopirox (LOPROX) 0.77 % cream Apply topically 2 (two) times a day   • glucosamine-chondroitin 500-400 MG tablet Take 1 tablet by mouth 3 (three) times a day   • ibuprofen (MOTRIN) 200 mg tablet Take 200 mg by mouth every 6 (six) hours as needed for mild pain   • Multiple Vitamin (multivitamin) tablet Take 1 tablet by mouth daily   • Multiple Vitamins-Minerals (Hair Skin and Nails Formula) TABS Take by mouth in the morning   • nystatin-triamcinolone (MYCOLOG-II) ointment Apply topically 2 (two) times a day   • Omega-3 Fatty Acids (FISH OIL PO) Take by mouth in the morning   • Turmeric 500 MG CAPS Take by mouth in the morning   • [DISCONTINUED] tirzepatide (Zepbound) 10 mg/0.5 mL auto-injector Inject 0.5 mL (10 mg total) under the skin once a week   • [DISCONTINUED] docusate sodium (COLACE) 100 mg capsule Take 1 capsule (100 mg total) by mouth daily as needed for constipation   • [DISCONTINUED] ondansetron (ZOFRAN) 4 mg tablet Take 1 tablet (4 mg total) by mouth every 8 (eight) hours as needed for nausea or vomiting     Allergies   Allergen Reactions   • Bee Venom Anaphylaxis   • Neosporin [Bacitracin-Polymyxin B] Blisters     Bad blisters per patient and rash    • Medical Tape Itching and Rash     Holter and Telemetry patches   • Penicillins Rash     Immunization History   Administered Date(s) Administered   • COVID-19 MODERNA VACC 0.5 ML IM 03/17/2021, 04/14/2021, 11/04/2021   • COVID-19 Moderna mRNA Vaccine 12 Yr+ 50 mcg/0.5 mL (Spikevax) 12/20/2023   • COVID-19 PFIZER VACCINE 0.3 ML IM 10/14/2022   • COVID-19  "Pfizer Vac BIVALENT Jim-sucrose 12 Yr+ IM 10/14/2022   • INFLUENZA 10/03/2011, 03/10/2014, 10/12/2016, 09/26/2019, 10/29/2020, 11/22/2021, 10/14/2022   • Influenza Quadrivalent Preservative Free 3 years and older IM 09/26/2019, 10/29/2020   • Tdap 03/29/2019   • Tuberculin Skin Test-PPD Intradermal 09/23/2021   • Zoster Vaccine Recombinant 10/30/2020, 05/01/2021     Objective   Pulse 68   Temp 97.9 °F (36.6 °C) (Tympanic)   Resp 16   Ht 5' 5\" (1.651 m)   Wt 82.5 kg (181 lb 12.8 oz)   LMP  (LMP Unknown)   SpO2 98%   BMI 30.25 kg/m²     Physical Exam  Vitals and nursing note reviewed.   Constitutional:       Appearance: Normal appearance. She is well-developed.   HENT:      Head: Normocephalic and atraumatic.      Right Ear: External ear normal.      Left Ear: External ear normal.   Eyes:      Conjunctiva/sclera: Conjunctivae normal.   Cardiovascular:      Rate and Rhythm: Normal rate and regular rhythm.      Heart sounds: Normal heart sounds.   Pulmonary:      Effort: Pulmonary effort is normal.      Breath sounds: Normal breath sounds.   Musculoskeletal:         General: Normal range of motion.      Cervical back: Normal range of motion.   Skin:     General: Skin is warm and dry.   Neurological:      Mental Status: She is alert and oriented to person, place, and time.   Psychiatric:         Mood and Affect: Mood normal.         Behavior: Behavior normal.         "

## 2025-03-12 NOTE — PROGRESS NOTES
Weight Management Medical Nutrition Assessment  Kavya presented for a meal planning session-2/12. Today's weight is 182.9#. She lost 2# x 3.5 weeks. Was at 177# on home scale before holiday. Attributes this to salt content of foods. Prepared a meal for [employer] day and was eating leftovers this week. Will weigh herself 1-2x per week. She is tracking her intake on the [employer] lisandra and meeting protein needs. Following a Mediterranean lifestyle. She is exercising often. Upcoming surgery on ankle in June. Will f/u in 1 month.    Patient seen by Medical Provider in past 6 months:  no (seen in March 2024)  Requested to schedule appointment with Medical Provider: No      Anthropometric Measurements  Start Weight (#) & Date: 229.6# 5/3/22  Current Weight (#): 182.9#  TBW % Change from start weight: 20.4%  Ideal Body Weight (#): 125#  Goal Weight (#): 155#  Highest: 246#  Lowest: n/a    Weight Loss History  Previous weight loss attempts: VLCD Program, Healthy Core Program, Prescription Weight Loss Medication, keto diet, counseling with RD, whole 30, Weight Watchers program    Food and Nutrition Related History  Wake up: 6AM   Bed Time: 10PM  Wakes up 1-2x per night.    Food Recall  Breakfast: 6AM [employer] fruit loops protein shake (160kcal, 30g protein)  1 cup coffee with half and half    Snack: 10AM gala apple OR 1 slice toast with [employer] chicken salad  Lunch: stuffed pepper soup (ground beef, rice cauliflower, peppers, onions, tomato sauce)  Snack: pretzel thins + hummus  Dinner: fish + couscous + spinach + grape tomatoes  Snack: skip    Takes prebiotics/probiotics and Nutrafol.     Beverages: 60-80oz water, 1 cup coffee with half and half, seltzer water  Alcohol: socially (reduced in January and gave up alcohol)  Volume of beverage intake: at least 60-80oz    Weekends: Same  Cravings: sweets  Trouble area of day: n/a    Frequency of Eating out: once every couple of weeks  Food restrictions: dislikes anchovies    Cooking: self   Food Shopping: self    Physical Activity Intake- [place]  Activity: HIIT workouts (Grow with [place]) for 30 minutes  Frequency: frequently   Physical limitations/barriers to exercise: arthritis in both knees, arthritis in ankle    Estimated Needs   Energy  SECA: BMR: n/a      X 1.3 -1000 =  [employer] Needs: BMR : 1624   1-2# loss weekly sedentary:  949-1449             1-2# loss weekly lightly active: 6227-1400  Maintenance calories for sedentary activity level: 1949  Protein: 68-85g      (1.2-1.5g/kg IBW)  Fluid Requirement Calculator   Total Fluid: 94oz     ([place]-Segar Method)  Free Fluid: 75oz ([place]-Segar Method - 20%)    Nutrition Diagnosis  Yes;    Overweight/obesity  related to Food and nutrition related knowledge deficit as evidenced by  BMI more than normative standard for age and sex (obesity-grade I 30-34.9)        Nutrition Intervention    Nutrition Prescription  Calories: 1,000-1,400 (can flex up to 1600 if activity increases)  Protein: 90  Fluid: 67    Meal Plan (Umberto/Pro/Carb)  Breakfast: 300-400/25-30  Snack: 100-150/5-10  Lunch: 300-400/25-30  Snack: 100-150/5-10  Dinner: 300-400/25-30  Snack:    Nutrition Education:    Calorie controlled menu  Lean protein food choices  Healthy snack options  Food journaling tips      Nutrition Counseling:  Strategies: meal planning, portion sizes, healthy snack choices, hydration, fiber intake, protein intake, exercise, food journal      Monitoring and Evaluation:  Evaluation criteria:  Energy Intake  Meet protein needs  Maintain adequate hydration  Monitor weekly weight  Meal planning/preparation  Food journal   Decreased portions at mealtimes and snacks  Physical activity     Barriers to learning:none  Readiness to change: Action:  (Changing behavior)  Comprehension: very good  Expected Compliance: very good

## 2025-03-12 NOTE — ASSESSMENT & PLAN NOTE
Component      Latest Ref Rng 6/17/2023 4/20/2024   Cholesterol      See Comment mg/dL 238 (H)  229 (H)    Triglycerides      See Comment mg/dL 116  88    HDL      >=50 mg/dL 77  68    LDL Calculated      0 - 100 mg/dL 138 (H)  143 (H)       - Elevated LDL. HDL is good.  - Encourage healthy diet, regular exercise, and continued weight loss. She has lost 73 pounds. Suspect lipid panel will improve.  - Will continue to monitor fasting lipid panel.

## 2025-03-12 NOTE — ASSESSMENT & PLAN NOTE
- Discussed switching from Zyrtec to either Claritin or Xyzal.  - Recommend Flonase nasal spray.

## 2025-03-12 NOTE — ASSESSMENT & PLAN NOTE
- Down 4 pounds since last visit. She is down a total of 73 pounds.  - Continue Zepbound 10 mg weekly. Discussed side effects.  - Continue healthy diet and regular exercise.  - Recommend follow up in 3 months.   Orders:  •  tirzepatide (Zepbound) 10 mg/0.5 mL auto-injector; Inject 0.5 mL (10 mg total) under the skin once a week

## 2025-03-20 ENCOUNTER — CLINICAL SUPPORT (OUTPATIENT)
Dept: BARIATRICS | Facility: CLINIC | Age: 60
End: 2025-03-20
Payer: COMMERCIAL

## 2025-03-20 ENCOUNTER — CLINICAL SUPPORT (OUTPATIENT)
Dept: BARIATRICS | Facility: CLINIC | Age: 60
End: 2025-03-20

## 2025-03-20 VITALS — BODY MASS INDEX: 30.47 KG/M2 | HEIGHT: 65 IN | WEIGHT: 182.9 LBS

## 2025-03-20 VITALS — HEIGHT: 65 IN | BODY MASS INDEX: 30.47 KG/M2 | WEIGHT: 182.9 LBS

## 2025-03-20 DIAGNOSIS — R63.5 ABNORMAL WEIGHT GAIN: Primary | ICD-10-CM

## 2025-03-20 DIAGNOSIS — E66.09 CLASS 1 OBESITY DUE TO EXCESS CALORIES WITH SERIOUS COMORBIDITY AND BODY MASS INDEX (BMI) OF 30.0 TO 30.9 IN ADULT: Primary | ICD-10-CM

## 2025-03-20 DIAGNOSIS — E66.811 CLASS 1 OBESITY DUE TO EXCESS CALORIES WITH SERIOUS COMORBIDITY AND BODY MASS INDEX (BMI) OF 30.0 TO 30.9 IN ADULT: Primary | ICD-10-CM

## 2025-03-20 PROCEDURE — WEIGHT

## 2025-03-20 PROCEDURE — S9470 NUTRITIONAL COUNSELING, DIET: HCPCS

## 2025-03-20 PROCEDURE — RECHECK

## 2025-04-14 NOTE — PROGRESS NOTES
Weight Management Medical Nutrition Assessment  Kavya presented for a meal planning session-3/12. Today's weight is 180.6#. She lost 2.3# x 4 weeks. Overall loss of 49#. Was 177# on home scale. Ate high sodium foods yesterday and this can cause water retention. Explained weight fluctuates. Continues to prepare Mediterranean style meals and is mindful of portion sizes. She felt sick after eating 2 donuts. Sugar and high fat foods make her feel nauseous. Ankle surgery scheduled for June. Her goal is to [place] bike [place] until her surgery. Will be on an exercise restriction for 4-6 weeks.    Patient seen by Medical Provider in past 6 months:  no (seen in March 2024)  Requested to schedule appointment with Medical Provider: No      Anthropometric Measurements  Start Weight (#) & Date: 229.6# 5/3/22  Current Weight (#): 180.6#  TBW % Change from start weight: 21.4%  Ideal Body Weight (#): 125#  Goal Weight (#): 155#  Highest: 246#  Lowest: n/a    Weight Loss History  Previous weight loss attempts: VLCD Program, Healthy Core Program, Prescription Weight Loss Medication, keto diet, counseling with RD, whole 30, Weight Watchers program    Food and Nutrition Related History  Wake up: 6AM   Bed Time: 10PM  Wakes up 1-2x per night.    Food Recall  Breakfast: 6AM [employer] fruit loops protein shake (160kcal, 30g protein)  1 cup coffee with half and half    Snack: 10AM gala apple OR 1 slice toast with [employer] chicken salad  Lunch: stuffed pepper soup (ground beef, rice cauliflower, peppers, onions, tomato sauce)  Snack: pretzel thins + hummus  Dinner: cauliflower crust pizza OR chicken + cucumbers + hummus  Snack: skip    Takes prebiotics/probiotics and Nutrafol.     Beverages: 60-80oz water, 1 cup coffee with half and half, seltzer water  Alcohol: socially (reduced in January and gave up alcohol)  Volume of beverage intake: at least 60-80oz    Weekends: Same  Cravings: sweets  Trouble area of day: n/a    Frequency of  Eating out: once every couple of weeks  Food restrictions: dislikes anchovies   Cooking: self   Food Shopping: self    Physical Activity Intake- [place]  Activity: HIIT workouts (Grow with [place] Antonio) for 30 minutes  Frequency: frequently   Physical limitations/barriers to exercise: arthritis in both knees, arthritis in ankle    Estimated Needs   Energy  SECA: BMR: n/a      X 1.3 -1000 =  [place] [address] [employer] Needs: BMR : 1624   1-2# loss weekly sedentary:  949-1449             1-2# loss weekly lightly active: 3764-0598  Maintenance calories for sedentary activity level: 1949  Protein: 68-85g      (1.2-1.5g/kg IBW)  Fluid Requirement Calculator   Total Fluid: 94oz     ([place]-Segar Method)  Free Fluid: 75oz (Willian-Segar Method - 20%)    Nutrition Diagnosis  Yes;    Overweight/obesity  related to Food and nutrition related knowledge deficit as evidenced by  BMI more than normative standard for age and sex (obesity-grade I 30-34.9)         Nutrition Intervention    Nutrition Prescription  Calories: 1,000-1,400 (can flex up to 1600 if activity increases)  Protein: 90  Fluid: 67    Meal Plan (Umberto/Pro/Carb)  Breakfast: 300-400/25-30  Snack: 100-150/5-10  Lunch: 300-400/25-30  Snack: 100-150/5-10  Dinner: 300-400/25-30  Snack:    Nutrition Education:    Calorie controlled menu  Lean protein food choices  Healthy snack options  Food journaling tips      Nutrition Counseling:  Strategies: meal planning, portion sizes, healthy snack choices, hydration, fiber intake, protein intake, exercise, food journal      Monitoring and Evaluation:  Evaluation criteria:  Energy Intake  Meet protein needs  Maintain adequate hydration  Monitor weekly weight  Meal planning/preparation  Food journal   Decreased portions at mealtimes and snacks  Physical activity     Barriers to learning:none  Readiness to change: Action:  (Changing behavior)  Comprehension: very good  Expected Compliance: very good

## 2025-04-24 ENCOUNTER — CLINICAL SUPPORT (OUTPATIENT)
Dept: BARIATRICS | Facility: CLINIC | Age: 60
End: 2025-04-24
Payer: COMMERCIAL

## 2025-04-24 VITALS — WEIGHT: 180.6 LBS | HEIGHT: 65 IN | BODY MASS INDEX: 30.09 KG/M2

## 2025-04-24 DIAGNOSIS — E66.811 CLASS 1 OBESITY DUE TO EXCESS CALORIES WITH SERIOUS COMORBIDITY AND BODY MASS INDEX (BMI) OF 30.0 TO 30.9 IN ADULT: Primary | ICD-10-CM

## 2025-04-24 DIAGNOSIS — E66.09 CLASS 1 OBESITY DUE TO EXCESS CALORIES WITH SERIOUS COMORBIDITY AND BODY MASS INDEX (BMI) OF 30.0 TO 30.9 IN ADULT: Primary | ICD-10-CM

## 2025-04-24 PROCEDURE — RECHECK

## 2025-04-24 PROCEDURE — S9470 NUTRITIONAL COUNSELING, DIET: HCPCS

## 2025-04-26 ENCOUNTER — APPOINTMENT (OUTPATIENT)
Dept: LAB | Age: 60
End: 2025-04-26
Payer: COMMERCIAL

## 2025-04-26 DIAGNOSIS — E66.09 CLASS 1 OBESITY DUE TO EXCESS CALORIES WITH SERIOUS COMORBIDITY AND BODY MASS INDEX (BMI) OF 30.0 TO 30.9 IN ADULT: ICD-10-CM

## 2025-04-26 DIAGNOSIS — E66.811 CLASS 1 OBESITY DUE TO EXCESS CALORIES WITH SERIOUS COMORBIDITY AND BODY MASS INDEX (BMI) OF 30.0 TO 30.9 IN ADULT: ICD-10-CM

## 2025-04-26 DIAGNOSIS — Z13.1 DIABETES MELLITUS SCREENING: ICD-10-CM

## 2025-04-26 DIAGNOSIS — E78.5 HYPERLIPIDEMIA, UNSPECIFIED HYPERLIPIDEMIA TYPE: ICD-10-CM

## 2025-04-26 LAB
ALBUMIN SERPL BCG-MCNC: 4.1 G/DL (ref 3.5–5)
ALP SERPL-CCNC: 79 U/L (ref 34–104)
ALT SERPL W P-5'-P-CCNC: 10 U/L (ref 7–52)
ANION GAP SERPL CALCULATED.3IONS-SCNC: 7 MMOL/L (ref 4–13)
AST SERPL W P-5'-P-CCNC: 17 U/L (ref 13–39)
BASOPHILS # BLD AUTO: 0.04 THOUSANDS/ÂΜL (ref 0–0.1)
BASOPHILS NFR BLD AUTO: 1 % (ref 0–1)
BILIRUB SERPL-MCNC: 0.39 MG/DL (ref 0.2–1)
BUN SERPL-MCNC: 19 MG/DL (ref 5–25)
CALCIUM SERPL-MCNC: 9.5 MG/DL (ref 8.4–10.2)
CHLORIDE SERPL-SCNC: 106 MMOL/L (ref 96–108)
CHOLEST SERPL-MCNC: 260 MG/DL (ref ?–200)
CO2 SERPL-SCNC: 29 MMOL/L (ref 21–32)
CREAT SERPL-MCNC: 0.89 MG/DL (ref 0.6–1.3)
EOSINOPHIL # BLD AUTO: 0.09 THOUSAND/ÂΜL (ref 0–0.61)
EOSINOPHIL NFR BLD AUTO: 2 % (ref 0–6)
ERYTHROCYTE [DISTWIDTH] IN BLOOD BY AUTOMATED COUNT: 12.4 % (ref 11.6–15.1)
EST. AVERAGE GLUCOSE BLD GHB EST-MCNC: 108 MG/DL
GFR SERPL CREATININE-BSD FRML MDRD: 71 ML/MIN/1.73SQ M
GLUCOSE P FAST SERPL-MCNC: 85 MG/DL (ref 65–99)
HBA1C MFR BLD: 5.4 %
HCT VFR BLD AUTO: 39.5 % (ref 34.8–46.1)
HDLC SERPL-MCNC: 69 MG/DL
HGB BLD-MCNC: 13 G/DL (ref 11.5–15.4)
IMM GRANULOCYTES # BLD AUTO: 0.01 THOUSAND/UL (ref 0–0.2)
IMM GRANULOCYTES NFR BLD AUTO: 0 % (ref 0–2)
LDLC SERPL CALC-MCNC: 175 MG/DL (ref 0–100)
LYMPHOCYTES # BLD AUTO: 2.07 THOUSANDS/ÂΜL (ref 0.6–4.47)
LYMPHOCYTES NFR BLD AUTO: 39 % (ref 14–44)
MCH RBC QN AUTO: 30.4 PG (ref 26.8–34.3)
MCHC RBC AUTO-ENTMCNC: 32.9 G/DL (ref 31.4–37.4)
MCV RBC AUTO: 92 FL (ref 82–98)
MONOCYTES # BLD AUTO: 0.42 THOUSAND/ÂΜL (ref 0.17–1.22)
MONOCYTES NFR BLD AUTO: 8 % (ref 4–12)
NEUTROPHILS # BLD AUTO: 2.75 THOUSANDS/ÂΜL (ref 1.85–7.62)
NEUTS SEG NFR BLD AUTO: 50 % (ref 43–75)
NRBC BLD AUTO-RTO: 0 /100 WBCS
PLATELET # BLD AUTO: 201 THOUSANDS/UL (ref 149–390)
PMV BLD AUTO: 10.5 FL (ref 8.9–12.7)
POTASSIUM SERPL-SCNC: 4.5 MMOL/L (ref 3.5–5.3)
PROT SERPL-MCNC: 6.3 G/DL (ref 6.4–8.4)
RBC # BLD AUTO: 4.28 MILLION/UL (ref 3.81–5.12)
SODIUM SERPL-SCNC: 142 MMOL/L (ref 135–147)
TRIGL SERPL-MCNC: 82 MG/DL (ref ?–150)
TSH SERPL DL<=0.05 MIU/L-ACNC: 1.53 UIU/ML (ref 0.45–4.5)
WBC # BLD AUTO: 5.38 THOUSAND/UL (ref 4.31–10.16)

## 2025-04-26 PROCEDURE — 36415 COLL VENOUS BLD VENIPUNCTURE: CPT

## 2025-04-26 PROCEDURE — 85025 COMPLETE CBC W/AUTO DIFF WBC: CPT

## 2025-04-26 PROCEDURE — 80053 COMPREHEN METABOLIC PANEL: CPT

## 2025-04-26 PROCEDURE — 84443 ASSAY THYROID STIM HORMONE: CPT

## 2025-04-26 PROCEDURE — 83036 HEMOGLOBIN GLYCOSYLATED A1C: CPT

## 2025-04-26 PROCEDURE — 80061 LIPID PANEL: CPT

## 2025-04-30 ENCOUNTER — OFFICE VISIT (OUTPATIENT)
Dept: FAMILY MEDICINE CLINIC | Facility: CLINIC | Age: 60
End: 2025-04-30
Payer: COMMERCIAL

## 2025-04-30 VITALS
DIASTOLIC BLOOD PRESSURE: 60 MMHG | BODY MASS INDEX: 29.49 KG/M2 | SYSTOLIC BLOOD PRESSURE: 96 MMHG | HEIGHT: 65 IN | TEMPERATURE: 97.9 F | OXYGEN SATURATION: 96 % | RESPIRATION RATE: 16 BRPM | WEIGHT: 177 LBS | HEART RATE: 93 BPM

## 2025-04-30 DIAGNOSIS — Z00.00 HEALTHCARE MAINTENANCE: Primary | ICD-10-CM

## 2025-04-30 DIAGNOSIS — Z23 ENCOUNTER FOR IMMUNIZATION: ICD-10-CM

## 2025-04-30 DIAGNOSIS — E78.2 MIXED HYPERLIPIDEMIA: ICD-10-CM

## 2025-04-30 DIAGNOSIS — E66.3 OVERWEIGHT WITH BODY MASS INDEX (BMI) OF 29 TO 29.9 IN ADULT: ICD-10-CM

## 2025-04-30 DIAGNOSIS — Z12.31 ENCOUNTER FOR SCREENING MAMMOGRAM FOR BREAST CANCER: ICD-10-CM

## 2025-04-30 PROCEDURE — 90677 PCV20 VACCINE IM: CPT

## 2025-04-30 PROCEDURE — 90471 IMMUNIZATION ADMIN: CPT

## 2025-04-30 PROCEDURE — 99396 PREV VISIT EST AGE 40-64: CPT | Performed by: NURSE PRACTITIONER

## 2025-04-30 RX ORDER — TIRZEPATIDE 10 MG/.5ML
10 INJECTION, SOLUTION SUBCUTANEOUS WEEKLY
Qty: 6 ML | Refills: 1 | Status: SHIPPED | OUTPATIENT
Start: 2025-04-30 | End: 2025-07-29

## 2025-04-30 NOTE — ASSESSMENT & PLAN NOTE
Orders:  •  tirzepatide (Zepbound) 10 mg/0.5 mL auto-injector; Inject 0.5 mL (10 mg total) under the skin once a week

## 2025-04-30 NOTE — ASSESSMENT & PLAN NOTE
- Reviewed immunizations and screenings. Prevnar 20 given in office today.  - UTD with dental, vision, and GYN exams.   - UTD with colonoscopy.  - Mammogram ordered.

## 2025-04-30 NOTE — ASSESSMENT & PLAN NOTE
Component      Latest Ref Rng 4/20/2024 4/26/2025   Cholesterol      See Comment mg/dL 229 (H)  260 (H)    Triglycerides      See Comment mg/dL 88  82    HDL      >=50 mg/dL 68  69    LDL Calculated      0 - 100 mg/dL 143 (H)  175 (H)       - LDL not controlled. Her HDL is good.   - Continue low fat diet, regular exercise, and continued weight loss efforts.   - Will continue to monitor fasting lipid panel.

## 2025-04-30 NOTE — PROGRESS NOTES
Name: Kavya Stevens      : 1965      MRN: [id number]  Encounter Provider: CONSTANCE Aguiar  Encounter Date: 2025   Encounter department:  [facility]  :  Assessment & Plan  Healthcare maintenance  - Reviewed immunizations and screenings. Prevnar 20 given in office today.  - UTD with dental, vision, and GYN exams.   - UTD with colonoscopy.  - Mammogram ordered.        Mixed hyperlipidemia  Component      Latest Ref Rng 2024   Cholesterol      See Comment mg/dL 229 (H)  260 (H)    Triglycerides      See Comment mg/dL 88  82    HDL      >=50 mg/dL 68  69    LDL Calculated      0 - 100 mg/dL 143 (H)  175 (H)       - LDL not controlled. Her HDL is good.   - Continue low fat diet, regular exercise, and continued weight loss efforts.   - Will continue to monitor fasting lipid panel.        Overweight with body mass index (BMI) of 29 to 29.9 in adult  - Down 4 pounds since last visit. She is down a total of 77 pounds.  - Continue Zepbound 10 mg weekly. Discussed side effects.  - Continue healthy diet and regular exercise.  - Recommend follow up in 3 months.      Orders:  •  tirzepatide (Zepbound) 10 mg/0.5 mL auto-injector; Inject 0.5 mL (10 mg total) under the skin once a week    Encounter for immunization    Orders:  •  Pneumococcal Conjugate Vaccine 20-valent (Pcv20)    Encounter for screening mammogram for breast cancer    Orders:  •  Mammo screening bilateral w 3d and cad; Future           History of Present Illness   Patient with PMH of HLD and obesity presents to office today for annual physical. She is taking her prescribed medications and reports no side effects. She is on Zepbound for weight loss. She is down 4 pounds since her last visit. She is down a total of 77 pounds since starting the medication. She had her blood work done which showed elevated cholesterol but was otherwise stable. She is planning to have ankle surgery in . Denies any  "significant concerns or complaints today.          Review of Systems   Constitutional:  Negative for fatigue and fever.   HENT:  Negative for congestion, nosebleeds and trouble swallowing.    Eyes:  Negative for visual disturbance.   Respiratory:  Negative for cough and shortness of breath.    Cardiovascular:  Negative for chest pain and palpitations.   Gastrointestinal:  Negative for abdominal pain and blood in stool.   Endocrine: Negative for cold intolerance and heat intolerance.   Genitourinary:  Negative for difficulty urinating, dysuria and frequency.   Musculoskeletal:  Negative for gait problem.   Skin:  Negative for rash.   Neurological:  Negative for dizziness, syncope and headaches.   Hematological:  Negative for adenopathy.   Psychiatric/Behavioral:  Negative for behavioral problems.        Objective   BP 96/60 (BP Location: Left arm, Patient Position: Sitting, Cuff Size: Adult)   Pulse 93   Temp 97.9 °F (36.6 °C) (Tympanic)   Resp 16   Ht 5' 5\" (1.651 m)   Wt 80.3 kg (177 lb)   LMP  (LMP Unknown)   SpO2 96%   BMI 29.45 kg/m²      Physical Exam  Vitals and nursing note reviewed.   Constitutional:       General: She is not in acute distress.     Appearance: Normal appearance. She is well-developed. She is not ill-appearing.   HENT:      Head: Normocephalic and atraumatic.      Right Ear: Tympanic membrane, ear canal and external ear normal.      Left Ear: Tympanic membrane, ear canal and external ear normal.      Nose: Nose normal.      Mouth/Throat:      Mouth: Mucous membranes are moist.   Eyes:      Conjunctiva/sclera: Conjunctivae normal.      Pupils: Pupils are equal, round, and reactive to light.   Cardiovascular:      Rate and Rhythm: Normal rate and regular rhythm.      Heart sounds: Normal heart sounds.   Pulmonary:      Effort: Pulmonary effort is normal.      Breath sounds: Normal breath sounds.   Abdominal:      General: Bowel sounds are normal.      Palpations: Abdomen is soft. "   Musculoskeletal:         General: Normal range of motion.      Cervical back: Normal range of motion.   Lymphadenopathy:      Cervical: No cervical adenopathy.   Skin:     General: Skin is warm and dry.   Neurological:      Mental Status: She is alert and oriented to person, place, and time.   Psychiatric:         Mood and Affect: Mood normal.         Behavior: Behavior normal.

## 2025-04-30 NOTE — ASSESSMENT & PLAN NOTE
- Down 4 pounds since last visit. She is down a total of 77 pounds.  - Continue Zepbound 10 mg weekly. Discussed side effects.  - Continue healthy diet and regular exercise.  - Recommend follow up in 3 months.      Orders:  •  tirzepatide (Zepbound) 10 mg/0.5 mL auto-injector; Inject 0.5 mL (10 mg total) under the skin once a week

## 2025-05-01 ENCOUNTER — TELEPHONE (OUTPATIENT)
Age: 60
End: 2025-05-01

## 2025-05-01 ENCOUNTER — TELEPHONE (OUTPATIENT)
Dept: OBGYN CLINIC | Facility: CLINIC | Age: 60
End: 2025-05-01

## 2025-05-01 NOTE — TELEPHONE ENCOUNTER
Caller: Kavya     Doctor: Dr. Lachman     Reason for call: Patient states she has surgery June 05 with Dr Lachman and would like to make sure there is no testing or labs she needs done for pre op. Please advise.     Call back#: [phone]

## 2025-05-12 ENCOUNTER — TELEPHONE (OUTPATIENT)
Age: 60
End: 2025-05-12

## 2025-05-12 ENCOUNTER — TELEPHONE (OUTPATIENT)
Dept: OBGYN CLINIC | Facility: CLINIC | Age: 60
End: 2025-05-12

## 2025-05-12 NOTE — TELEPHONE ENCOUNTER
Caller: Patient     Doctor: Dr. Lachman    Reason for call: Patient is calling to find out if she needs her gel nail polish removed for her surgery. She doesn't have anything on her toes but does have it on her fingernails.    Call back#: [phone]

## 2025-05-12 NOTE — PROGRESS NOTES
Weight Management Medical Nutrition Assessment  Kavya presented for a meal planning session-4/12. Today's weight is 177.2#. She lost 3.4# x 4 weeks. Overall loss of 52.4#. Now on 10mg Zepbound. Struggling with appetite and nausea. Able to eat small meals during the day. By dinner time has little appetite. Per diet recall intake consistently below calorie needs. RD notified PCP of patient concerns. Provided list of protein bar recommendations to assist with meeting calorie needs. Will contact office to schedule follow up after surgery.    Patient seen by Medical Provider in past 6 months:  no (seen in March 2024)  Requested to schedule appointment with Medical Provider: No      Anthropometric Measurements  Start Weight (#) & Date: 229.6# 5/3/22  Current Weight (#): 177.2#  TBW % Change from start weight: 23%  Ideal Body Weight (#): 125#  Goal Weight (#): 155#  Highest: 246#  Lowest: n/a    Weight Loss History  Previous weight loss attempts: VLCD Program, Healthy Core Program, Prescription Weight Loss Medication, keto diet, counseling with RD, whole 30, Weight Watchers program    Food and Nutrition Related History  Wake up: 6AM   Bed Time: 10PM  Wakes up 1-2x per night.    Food Recall  Breakfast: 6AM [employer] fruit loops protein shake (160kcal, 30g protein)  1 cup coffee with half and half    Snack:   Lunch: 1/2 cup cottage cheese with apple butter  Snack:   Dinner: cauliflower crust pizza OR chicken + cucumbers + hummus  Snack: skip    Takes prebiotics/probiotics and Nutrafol.     Beverages: 50-60oz water, 1 cup coffee with half and half, seltzer water  Alcohol: socially (reduced in January and gave up alcohol)  Volume of beverage intake: at least 60    Weekends: Same  Cravings: sweets  Trouble area of day: n/a    Frequency of Eating out: once every couple of weeks  Food restrictions: dislikes anchovies   Cooking: self   Food Shopping: self    Physical Activity Intake- [place]  Activity:  HIIT workouts (Grow with Marco Antonio) for 30 minutes  Frequency: frequently   Physical limitations/barriers to exercise: arthritis in both knees, arthritis in ankle    Estimated Needs   Energy  SECA: BMR: n/a      X 1.3 -1000 =  Luisito Sullivan Energy Needs: BMR : 1624   1-2# loss weekly sedentary:  949-1449             1-2# loss weekly lightly active: 9554-5034  Maintenance calories for sedentary activity level: 1949  Protein: 68-85g      (1.2-1.5g/kg IBW)  Fluid Requirement Calculator   Total Fluid: 94oz     (Willian-Segar Method)  Free Fluid: 75oz (Dolores-Segar Method - 20%)    Nutrition Diagnosis  Yes;    Overweight/obesity  related to Food and nutrition related knowledge deficit as evidenced by  BMI more than normative standard for age and sex (overweight 25-29.9)         Nutrition Intervention    Nutrition Prescription  Calories: 1,000-1,400 (can flex up to 1600 if activity increases)  Protein: 90  Fluid: 67    Meal Plan (Umberto/Pro/Carb)  Breakfast: 300-400/25-30  Snack: 100-150/5-10  Lunch: 300-400/25-30  Snack: 100-150/5-10  Dinner: 300-400/25-30  Snack:    Nutrition Education:    Calorie controlled menu  Lean protein food choices  Healthy snack options  Food journaling tips      Nutrition Counseling:  Strategies: meal planning, portion sizes, healthy snack choices, hydration, fiber intake, protein intake, exercise, food journal      Monitoring and Evaluation:  Evaluation criteria:  Energy Intake  Meet protein needs  Maintain adequate hydration  Monitor weekly weight  Meal planning/preparation  Food journal   Decreased portions at mealtimes and snacks  Physical activity     Barriers to learning:none  Readiness to change: Action:  (Changing behavior)  Comprehension: very good  Expected Compliance: very good

## 2025-05-15 ENCOUNTER — TELEPHONE (OUTPATIENT)
Dept: OBGYN CLINIC | Facility: HOSPITAL | Age: 60
End: 2025-05-15

## 2025-05-15 NOTE — TELEPHONE ENCOUNTER
Caller: Patient    Doctor: Dr. Lachman    Reason for call: patient will be faxing over leave of absence form please contact patient to confirm which forms she should send for Dr.Lachman to complete and sign     Call back#: [phone]

## 2025-05-20 ENCOUNTER — CLINICAL SUPPORT (OUTPATIENT)
Dept: BARIATRICS | Facility: CLINIC | Age: 60
End: 2025-05-20
Payer: COMMERCIAL

## 2025-05-20 ENCOUNTER — TELEPHONE (OUTPATIENT)
Dept: FAMILY MEDICINE CLINIC | Facility: CLINIC | Age: 60
End: 2025-05-20

## 2025-05-20 VITALS — HEIGHT: 65 IN | BODY MASS INDEX: 29.52 KG/M2 | WEIGHT: 177.2 LBS

## 2025-05-20 DIAGNOSIS — E66.3 OVERWEIGHT WITH BODY MASS INDEX (BMI) OF 29 TO 29.9 IN ADULT: Primary | ICD-10-CM

## 2025-05-20 DIAGNOSIS — E66.3 OVERWEIGHT: Primary | ICD-10-CM

## 2025-05-20 PROCEDURE — S9470 NUTRITIONAL COUNSELING, DIET: HCPCS

## 2025-05-20 PROCEDURE — RECHECK

## 2025-05-20 RX ORDER — TIRZEPATIDE 7.5 MG/.5ML
7.5 INJECTION, SOLUTION SUBCUTANEOUS WEEKLY
Qty: 2 ML | Refills: 0 | Status: SHIPPED | OUTPATIENT
Start: 2025-05-20

## 2025-05-20 NOTE — TELEPHONE ENCOUNTER
----- Message from CONSTANCE Joseph sent at 5/20/2025  8:44 AM EDT -----  I received a message from patient's dietician about her concerns with her zepbound side effects and not being able to eat. Can we call patient to see if she wants to decrease to the 5 mg weekly?

## 2025-05-20 NOTE — TELEPHONE ENCOUNTER
Pt called back, she is wondering if she can go back on the Zepbound 7.5mg as she feels she did very well with that does. If you feel she will do better on the 5mg she is okay with that as well.     Please advise

## 2025-05-22 ENCOUNTER — ANESTHESIA EVENT (OUTPATIENT)
Dept: ANESTHESIOLOGY | Facility: HOSPITAL | Age: 60
End: 2025-05-22

## 2025-05-22 ENCOUNTER — ANESTHESIA (OUTPATIENT)
Dept: ANESTHESIOLOGY | Facility: HOSPITAL | Age: 60
End: 2025-05-22

## 2025-05-27 RX ORDER — TIRZEPATIDE 10 MG/.5ML
INJECTION, SOLUTION SUBCUTANEOUS
COMMUNITY
Start: 2025-05-20 | End: 2025-06-05

## 2025-05-27 NOTE — PRE-PROCEDURE INSTRUCTIONS
Pre-Surgery Instructions:   Medication Instructions    glucosamine-chondroitin 500-400 MG tablet Stop taking 7 days prior to surgery.    ibuprofen (MOTRIN) 200 mg tablet Stop taking 7 days prior to surgery.    Multiple Vitamin (multivitamin) tablet Stop taking 7 days prior to surgery.    Multiple Vitamins-Minerals (Hair Skin and Nails Formula) TABS Stop taking 7 days prior to surgery.    nystatin-triamcinolone (MYCOLOG-II) ointment Stop taking 1 day prior to surgery.    Omega-3 Fatty Acids (FISH OIL PO) Stop taking 7 days prior to surgery.    tirzepatide (Zepbound) 7.5 mg/0.5 mL auto-injector Stop taking 7 days prior to surgery. LD 5/28    Turmeric 500 MG CAPS Stop taking 7 days prior to surgery.    Zepbound 10 MG/0.5ML auto-injector Stop taking 7 days prior to surgery. LD 5/28   Medication instructions for day of surgery reviewed. Please take all instructed medications with only a sip of water.       You will receive a call one business day prior to surgery with an arrival time and hospital directions. If your surgery is scheduled on a Monday, the hospital will be calling you on the Friday prior to your surgery. If you have not heard from anyone by 8pm, please call the hospital supervisor through the hospital  at [phone]. ([place] [phone] or [place] [phone]).    Do not eat or drink anything after midnight the night before your surgery, including candy, mints, lifesavers, or chewing gum. Do not drink alcohol 24hrs before your surgery. Try not to smoke at least 24hrs before your surgery.       Follow the pre surgery showering instructions as listed in the “My Surgical Experience Booklet” or otherwise provided by your surgeon's office. Do not use a blade to shave the surgical area 1 week before surgery. It is okay to use a clean electric clippers up to 24 hours before surgery. Do not apply any lotions, creams, including makeup, cologne, deodorant, or perfumes after showering on the day of your  surgery. Do not use dry shampoo, hair spray, hair gel, or any type of hair products.     No contact lenses, eye make-up, or artificial eyelashes. Remove nail polish, including gel polish, and any artificial, gel, or acrylic nails if possible. Remove all jewelry including rings and body piercing jewelry.     Wear causal clothing that is easy to take on and off. Consider your type of surgery.    Keep any valuables, jewelry, piercings at home. Please bring any specially ordered equipment (sling, braces) if indicated.    Arrange for a responsible person to drive you to and from the hospital on the day of your surgery. Please confirm the visitor policy for the day of your procedure when you receive your phone call with an arrival time.     Call the surgeon's office with any new illnesses, exposures, or additional questions prior to surgery.    Please reference your “My Surgical Experience Booklet” for additional information to prepare for your upcoming surgery.

## 2025-05-30 PROBLEM — Z00.00 HEALTHCARE MAINTENANCE: Status: RESOLVED | Noted: 2025-04-30 | Resolved: 2025-05-30

## 2025-06-04 NOTE — DISCHARGE INSTR - AVS FIRST PAGE
James R Lachman, M.D.  Attending, Orthopaedic Surgery  [facility]  [place] Office Phone: [phone] ? Fax: [phone]  [place] Office Phone: [phone] ? Fax:[phone]    : Natacha Ovieod MA    Surgery Coordinators [place]: Shruthi Avila, [phone]  Radha Biju, [phone]  Surgery Coordinator [place]:  Vernell Melendez, [phone]                                                        Evelyn Brown, [phone]                                                                                                                      [email or web address]   POST-OPERATIVE INSTRUCTIONS    General Information:  Typical post operative visits are at the following intervals:  2-3 weeks post surgery, 6 weeks post surgery, 3 months post surgery, 6 months post surgery, and then on a yearly basis.  However, this may change based on Dr. Lachmans’ recommendation.  #1 post-operative rule for foot/ankle surgery:  ONCE YOU ARE OUT OF YOUR CAST AND/OR REMOVABLE BOOT, SWELLING MAY PERSIST FOR MANY MONTHS.  YOU MIGHT ALSO EXPERIENCE A BLUISH DISCOLORATION OF YOUR LEG.  THIS IS NORMAL AND PART OF THE USUAL POSTOPERATIVE EXPERIENCE.  DO NOT WAIT UNTIL YOUR BLOCK WEARS OFF TO TAKE YOUR PAIN MEDICATION.  IT TAKES A FEW DOSES OF THE PAIN MEDICATION TO REACH A THERAPEUTIC LEVEL.  TAKE A TABLET PROACTIVELY BEFORE YOU HAVE ANY PAIN AND AGAIN 4 HOURS LATER SO WHEN THE BLOCK WEARS OFF, YOU ARE NOT CAUGHT OFF GUARD.    SMOKING:  Smoking results in incomplete healing of fractures (broken bones) and joints that my have been fused.  Smoking and nicotine also prevents the growth of bone into ankle replacements and bone healing.  It also slows the healing of muscles and skin (soft tissue).  Therefore, please do not have surgery if you continue to smoke.  We reserve the right to cancel your surgery if we suspect that you are smoking.  DO NOT use  nicorette gum or other patches.  Please find an alternative method to quit smoking before your surgery and do not restart after surgery to allow for healing.      THREE RULES:    After surgery you will most likely be given the instructions “KEEP YOUR TOES ABOVE YOUR NOSE.”  This means that you MUST have your feet elevated higher than your heart.  Keeping your toes above your nose helps to heal the muscles and skin (soft tissues) by reducing swelling in your leg.  This position also helps to prevent infection, and is very important in avoiding deep venous thrombosis (blood clots).    In order to keep the blood circulating in your legs and in order to avoid deep vein   thrombosis (blood clots), we ask patients to GET UP ONCE AN HOUR during the day.  This means you should at least cross the room and come back.  It does not mean you have to be up for long periods of time.  In most cases we will not have people immediately put any weight on their operated part.  This is important to prevent loosening of metal or other devices holding the bones together.  It also prevents irritation of the soft tissues which can lead to prolonged healing.  When we say get up once an hour, please walk, hop or move with an assisted device.  This is important!    Do not do any excessive walking during the first few days after surgery.  Recovering from surgery is a full-time task for the patient.  Postoperative care is important to avoid irritating the skin incision, which can lead to infection.  Please do not plan activities or go out of town for several weeks after surgery.        AFTER YOUR SURGERY:  Bleeding through the bandage almost always occurs.  Do not let this alarm you.  Simply overwrap with an ABD pad and Ace bandage (The nurses discharging your from the day of surgery will provide this.)   If you think it is excessive, you can come in early for a dressing change (at around 1 week instead of 3 weeks postop.)    Do not get the  bandage wet.  Showering is possible with plastic protectors.   Be very careful, as the bathroom can be wet and slippery.  If you do get your dressing wet, it should be changed immediately.  Please contact us.      ONCE YOUR ARE OUT OF YOUR CAST AND/OR REMOVABLE BOOT, SWELLING MAY PERSIST FOR MANY MONTHS.  THERE WILL ALSO BE A BLUISH DISCOLORATION OF YOUR LEG FOR MONTHS.  THIS IS NORMAL AND PART OF THE USUAL POSTOPERATIVE EXPERIENCE.  WEARING COMPRESSION HOSE (ELASTIC STOCKINGS) CAN HELP AVOID SOME OF THIS SWELLING.    Ice the area 20 minutes every hour once the nerve block wears off. If you are in a cast or a splint, you may need to leave the ice on longer than 20 minutes in order to feel any benefits.       DRESSING:   The purpose of the surgical dressing is to keep your wound and the surgical site protected from the environment.  Most dressings contain splints, which help to hold your foot and ankle in a corrected position, and also allow the surgical site to heal properly. IF YOU GO TO THE EMERGENCY ROOM FOR ANY REASON, AND THEY REMOVE YOUR DRESSING OR SPLINT, YOU MUST BE SEEN IN DR. LACHMANS CLINIC TO HAVE IT REPLACED) AS SOON AS POSSIBLE (IDEALLY THE NEXT DAY).   If you have a drain in place, this will need to be removed in 1 day after surgery.  The time for the drain to be pulled will be written on your discharge instruction sheet.    CAST  INSTRUCTIONS:  You may or may not get a cast following surgery.  If you do, pay close attention to the following:    After application of a splint or cast, it is very important to elevate your leg for 24 to 72 hours.   The injured area should be elevated well above the heart.   Remember “Toes above your Nose”.  Rest and elevation greatly reduce pain and speed the healing process by minimizing early swelling.    CALL YOUR DOCTORS OFFICE OR VISIT LOCATION EMERGENCY ROOM IF YOU HAVE ANY OF THE FOLLOWING:    Significant increased pain, which may be caused by swelling (Strict  elevation will alleviate this)  Numbness and tingling in your hand or foot, which may be caused by too much pressure on the nerves (There is always numbness after surgery due to nerve blocks)  Burning and stinging, which may be caused by too much pressure on the skin  Excessive swelling below the cast, which may mean the cast is slowing your blood circulation  Loss of active movement of toes, which request an urgent evaluation (if you have had a nerve block, this is an expected thing and totally normal)  Loss of “capillary refill”.  Pinch the tip of toes and lexi the skin.  Release pressure and if the skin does not return pink then call the office immediately.      DO NOT GET YOUR CAST WET.   Bacteria thrive in moist dark areas.  We do not want this.   If your cast becomes wet, return to the office and we will apply another one.    PAIN AFTER SURGERY:  Narcotic pain medication can and will depress your respiratory system if taken in excess.  The goal of pain management with narcotics is to be comfortable not pain free.  If you take enough narcotics to be pain free then you run the risk of stopping breathing.  If this happens, call 911 immediately!  Pain in the heel is often  caused by pressure from the weight of your foot on the bed.  Make sure your heel is suspended off the bed by keeping a pillow underneath your calf not your knee.    Medications:  You will be given narcotic pain medication. Do NOT drive while taking narcotic medications. Medications such as Darvocet, Percocet, Vicoden or Tylenol #3, also contain acetaminophen (Tylenol). Do not take acetaminophen or Tylenol from home when taking theses medications. When you fill your prescription, you may ask the pharmacist if your pain medication has acetaminophen/Tylenol in it. It is okay to take Tylenol with Oxycontin/Oxycodone.   Unless you are allergic to aspirin or currently taking a blood thinner, Dr. Lachmans’ patients are requested to take one 81 mg  aspirin every 12 hours until you are back to walking normally after surgery (This can be up to 6 weeks). Ecotrin (Enteric-coated aspirin) is more sensitive to the stomach and we recommend purchasing this instead of regular aspirin to minimize the risk of stomach irritation.  Narcotic medications commonly cause nausea. Taking them with food will decrease this side effect. If you are having extreme nausea, please contact us for an alternative medication or for something that can be taken with this medication to decrease the nausea.   Also, narcotic medications frequently cause constipation. An increase of fiber, fruits and vegetables in your diet may alleviate this problem, or if necessary, you may use an over-the-counter medication such as senekot, colace, or Fibercon for constipation problems.   You should resume all medications you were taking prior to the surgery unless otherwise specified.   If you had fracture surgery, bony surgery like an osteotomy or fusion, or a surgery that requires bone healing, you are advised to take Vitamin D and Calcium to improve healing potential.  Vitamin D3 4000 units/day and Calcium 1200mg/day. These are over the counter medications so please pick them up at the pharmacy when you are picking up your prescriptions.    Activity:   Because of your recent foot surgery, your activity level will decrease. You will need to elevate your foot ABOVE the level of your heart for a minimum of four days. The length of time necessary for the swelling to go down, and for your wounds to heal properly depends greatly on your efforts here. Elevation is extremely important to avoid compromising the blood supply to your foot. Remember when your foot is down it will swell, which will increase pain and slow healing. Wiggle your toes frequently if possible.   If you go home with a regional block, (a type of anesthesia) the foot and leg will be numb. Think of ways to get into your house and around the  house until the block wears off.   Keep in mind that it may be a legal issue if you drive while in a cast or splint, especially when the splint is on the right foot. You may call the Department of Motor Vehicles to schedule a road test if you have adaptive equipment applied to your car.   The amount of weight you are allowed to bear on your foot will be written on your discharge sheet filled out at the time of surgery. The following is an explanation of the possibilities:     Non-weight bearing:   You are to put NO weight whatsoever on your foot. When using crutches or a walker, your foot should not touch the ground, except when you are standing. Then, it may rest on the ground. If you are to be non-weight bearing, and you are not compliant, you could compromise the surgery.     Some of our patients have been requesting prescriptions for a roll-a-bout knee scooter. [employer] and other insurances have been denying these claims, and you may either have to rent one or pay out of pocket to purchase one.

## 2025-06-05 ENCOUNTER — ANESTHESIA (OUTPATIENT)
Dept: PERIOP | Facility: AMBULARY SURGERY CENTER | Age: 60
End: 2025-06-05
Payer: COMMERCIAL

## 2025-06-05 ENCOUNTER — ANESTHESIA EVENT (OUTPATIENT)
Dept: PERIOP | Facility: AMBULARY SURGERY CENTER | Age: 60
End: 2025-06-05
Payer: COMMERCIAL

## 2025-06-05 ENCOUNTER — HOSPITAL ENCOUNTER (OUTPATIENT)
Facility: AMBULARY SURGERY CENTER | Age: 60
Setting detail: OUTPATIENT SURGERY
Discharge: HOME | End: 2025-06-05
Attending: ORTHOPAEDIC SURGERY | Admitting: ORTHOPAEDIC SURGERY
Payer: COMMERCIAL

## 2025-06-05 ENCOUNTER — APPOINTMENT (OUTPATIENT)
Dept: RADIOLOGY | Facility: AMBULARY SURGERY CENTER | Age: 60
End: 2025-06-05
Payer: COMMERCIAL

## 2025-06-05 VITALS
WEIGHT: 177 LBS | HEART RATE: 71 BPM | RESPIRATION RATE: 20 BRPM | DIASTOLIC BLOOD PRESSURE: 63 MMHG | SYSTOLIC BLOOD PRESSURE: 111 MMHG | OXYGEN SATURATION: 98 % | BODY MASS INDEX: 29.49 KG/M2 | TEMPERATURE: 98 F | HEIGHT: 65 IN

## 2025-06-05 DIAGNOSIS — M19.072 ARTHRITIS OF LEFT SUBTALAR JOINT: Primary | ICD-10-CM

## 2025-06-05 PROBLEM — E66.812 CLASS 2 SEVERE OBESITY DUE TO EXCESS CALORIES WITH SERIOUS COMORBIDITY AND BODY MASS INDEX (BMI) OF 35.0 TO 35.9 IN ADULT (HCC): Status: RESOLVED | Noted: 2024-07-24 | Resolved: 2025-06-05

## 2025-06-05 PROBLEM — E66.01 CLASS 2 SEVERE OBESITY DUE TO EXCESS CALORIES WITH SERIOUS COMORBIDITY AND BODY MASS INDEX (BMI) OF 35.0 TO 35.9 IN ADULT (HCC): Status: RESOLVED | Noted: 2024-07-24 | Resolved: 2025-06-05

## 2025-06-05 PROBLEM — E66.813 CLASS 3 SEVERE OBESITY DUE TO EXCESS CALORIES WITHOUT SERIOUS COMORBIDITY WITH BODY MASS INDEX (BMI) OF 40.0 TO 44.9 IN ADULT: Status: RESOLVED | Noted: 2024-03-27 | Resolved: 2025-06-05

## 2025-06-05 PROBLEM — E66.811 CLASS 1 OBESITY DUE TO EXCESS CALORIES WITH SERIOUS COMORBIDITY AND BODY MASS INDEX (BMI) OF 30.0 TO 30.9 IN ADULT: Status: RESOLVED | Noted: 2024-08-14 | Resolved: 2025-06-05

## 2025-06-05 PROBLEM — E66.09 CLASS 1 OBESITY DUE TO EXCESS CALORIES WITH SERIOUS COMORBIDITY AND BODY MASS INDEX (BMI) OF 30.0 TO 30.9 IN ADULT: Status: RESOLVED | Noted: 2024-08-14 | Resolved: 2025-06-05

## 2025-06-05 PROCEDURE — 28725 ARTHRODESIS SUBTALAR: CPT | Performed by: ORTHOPAEDIC SURGERY

## 2025-06-05 PROCEDURE — 28725 ARTHRODESIS SUBTALAR: CPT | Performed by: PHYSICIAN ASSISTANT

## 2025-06-05 PROCEDURE — C1713 ANCHOR/SCREW BN/BN,TIS/BN: HCPCS | Performed by: ORTHOPAEDIC SURGERY

## 2025-06-05 PROCEDURE — NC001 PR NO CHARGE: Performed by: ORTHOPAEDIC SURGERY

## 2025-06-05 PROCEDURE — C1769 GUIDE WIRE: HCPCS | Performed by: ORTHOPAEDIC SURGERY

## 2025-06-05 PROCEDURE — 73600 X-RAY EXAM OF ANKLE: CPT

## 2025-06-05 PROCEDURE — C1734 ORTH/DEVIC/DRUG BN/BN,TIS/BN: HCPCS | Performed by: ORTHOPAEDIC SURGERY

## 2025-06-05 PROCEDURE — C1781 MESH (IMPLANTABLE): HCPCS | Performed by: ORTHOPAEDIC SURGERY

## 2025-06-05 DEVICE — HEADLESS COMPRESSION SCREW
Type: IMPLANTABLE DEVICE | Site: HEEL | Status: FUNCTIONAL
Brand: FIXOS

## 2025-06-05 DEVICE — GRAFT BIO4 ORTHO BONE MATRIX 10ML: Type: IMPLANTABLE DEVICE | Site: HEEL | Status: FUNCTIONAL

## 2025-06-05 RX ORDER — DEXAMETHASONE SODIUM PHOSPHATE 10 MG/ML
INJECTION, SOLUTION INTRAMUSCULAR; INTRAVENOUS AS NEEDED
Status: DISCONTINUED | OUTPATIENT
Start: 2025-06-05 | End: 2025-06-05

## 2025-06-05 RX ORDER — SODIUM CHLORIDE, SODIUM LACTATE, POTASSIUM CHLORIDE, CALCIUM CHLORIDE 600; 310; 30; 20 MG/100ML; MG/100ML; MG/100ML; MG/100ML
INJECTION, SOLUTION INTRAVENOUS CONTINUOUS PRN
Status: DISCONTINUED | OUTPATIENT
Start: 2025-06-05 | End: 2025-06-05

## 2025-06-05 RX ORDER — LIDOCAINE HYDROCHLORIDE 10 MG/ML
INJECTION, SOLUTION EPIDURAL; INFILTRATION; INTRACAUDAL; PERINEURAL AS NEEDED
Status: DISCONTINUED | OUTPATIENT
Start: 2025-06-05 | End: 2025-06-05

## 2025-06-05 RX ORDER — EPHEDRINE SULFATE 50 MG/ML
INJECTION INTRAVENOUS AS NEEDED
Status: DISCONTINUED | OUTPATIENT
Start: 2025-06-05 | End: 2025-06-05

## 2025-06-05 RX ORDER — CLINDAMYCIN PHOSPHATE 900 MG/50ML
900 INJECTION, SOLUTION INTRAVENOUS ONCE
Status: COMPLETED | OUTPATIENT
Start: 2025-06-05 | End: 2025-06-05

## 2025-06-05 RX ORDER — FENTANYL CITRATE/PF 50 MCG/ML
25 SYRINGE (ML) INJECTION
Status: DISCONTINUED | OUTPATIENT
Start: 2025-06-05 | End: 2025-06-05 | Stop reason: HOSPADM

## 2025-06-05 RX ORDER — OXYCODONE HYDROCHLORIDE 5 MG/1
5 TABLET ORAL EVERY 4 HOURS PRN
Qty: 30 TABLET | Refills: 0 | Status: SHIPPED | OUTPATIENT
Start: 2025-06-05

## 2025-06-05 RX ORDER — VANCOMYCIN HYDROCHLORIDE 1 G/20ML
INJECTION, POWDER, LYOPHILIZED, FOR SOLUTION INTRAVENOUS AS NEEDED
Status: DISCONTINUED | OUTPATIENT
Start: 2025-06-05 | End: 2025-06-05 | Stop reason: HOSPADM

## 2025-06-05 RX ORDER — MAGNESIUM HYDROXIDE 1200 MG/15ML
LIQUID ORAL AS NEEDED
Status: DISCONTINUED | OUTPATIENT
Start: 2025-06-05 | End: 2025-06-05 | Stop reason: HOSPADM

## 2025-06-05 RX ORDER — PROPOFOL 10 MG/ML
INJECTION, EMULSION INTRAVENOUS AS NEEDED
Status: DISCONTINUED | OUTPATIENT
Start: 2025-06-05 | End: 2025-06-05

## 2025-06-05 RX ORDER — PROMETHAZINE HYDROCHLORIDE 25 MG/ML
25 INJECTION, SOLUTION INTRAMUSCULAR; INTRAVENOUS ONCE AS NEEDED
Status: DISCONTINUED | OUTPATIENT
Start: 2025-06-05 | End: 2025-06-05 | Stop reason: HOSPADM

## 2025-06-05 RX ORDER — BUPIVACAINE HYDROCHLORIDE 5 MG/ML
INJECTION, SOLUTION EPIDURAL; INTRACAUDAL; PERINEURAL
Status: COMPLETED | OUTPATIENT
Start: 2025-06-05 | End: 2025-06-05

## 2025-06-05 RX ORDER — ONDANSETRON 2 MG/ML
INJECTION INTRAMUSCULAR; INTRAVENOUS AS NEEDED
Status: DISCONTINUED | OUTPATIENT
Start: 2025-06-05 | End: 2025-06-05

## 2025-06-05 RX ORDER — ONDANSETRON 4 MG/1
4 TABLET, FILM COATED ORAL EVERY 8 HOURS PRN
Qty: 10 TABLET | Refills: 0 | Status: SHIPPED | OUTPATIENT
Start: 2025-06-05

## 2025-06-05 RX ORDER — OXYCODONE HYDROCHLORIDE 5 MG/1
5 TABLET ORAL ONCE AS NEEDED
Status: COMPLETED | OUTPATIENT
Start: 2025-06-05 | End: 2025-06-05

## 2025-06-05 RX ORDER — ASPIRIN 81 MG/1
81 TABLET ORAL 2 TIMES DAILY
Qty: 84 TABLET | Refills: 0 | Status: SHIPPED | OUTPATIENT
Start: 2025-06-05 | End: 2025-07-17

## 2025-06-05 RX ORDER — CHLORHEXIDINE GLUCONATE 40 MG/ML
SOLUTION TOPICAL DAILY PRN
Status: DISCONTINUED | OUTPATIENT
Start: 2025-06-05 | End: 2025-06-05 | Stop reason: HOSPADM

## 2025-06-05 RX ORDER — HYDROMORPHONE HCL/PF 1 MG/ML
0.25 SYRINGE (ML) INJECTION
Status: DISCONTINUED | OUTPATIENT
Start: 2025-06-05 | End: 2025-06-05 | Stop reason: HOSPADM

## 2025-06-05 RX ORDER — CHLORHEXIDINE GLUCONATE ORAL RINSE 1.2 MG/ML
15 SOLUTION DENTAL ONCE
Status: COMPLETED | OUTPATIENT
Start: 2025-06-05 | End: 2025-06-05

## 2025-06-05 RX ORDER — FENTANYL CITRATE 50 UG/ML
INJECTION, SOLUTION INTRAMUSCULAR; INTRAVENOUS AS NEEDED
Status: DISCONTINUED | OUTPATIENT
Start: 2025-06-05 | End: 2025-06-05

## 2025-06-05 RX ORDER — MIDAZOLAM HYDROCHLORIDE 2 MG/2ML
INJECTION, SOLUTION INTRAMUSCULAR; INTRAVENOUS AS NEEDED
Status: DISCONTINUED | OUTPATIENT
Start: 2025-06-05 | End: 2025-06-05

## 2025-06-05 RX ADMIN — FENTANYL CITRATE 25 MCG: 50 INJECTION INTRAMUSCULAR; INTRAVENOUS at 08:18

## 2025-06-05 RX ADMIN — OXYCODONE HYDROCHLORIDE 5 MG: 5 TABLET ORAL at 09:40

## 2025-06-05 RX ADMIN — CLINDAMYCIN PHOSPHATE 900 MG: 900 INJECTION, SOLUTION INTRAVENOUS at 07:33

## 2025-06-05 RX ADMIN — FENTANYL CITRATE 25 MCG: 50 INJECTION INTRAMUSCULAR; INTRAVENOUS at 08:13

## 2025-06-05 RX ADMIN — CHLORHEXIDINE GLUCONATE 15 ML: 1.2 SOLUTION ORAL at 06:59

## 2025-06-05 RX ADMIN — BUPIVACAINE 20 ML: 13.3 INJECTION, SUSPENSION, LIPOSOMAL INFILTRATION at 07:20

## 2025-06-05 RX ADMIN — MIDAZOLAM HYDROCHLORIDE 2 MG: 1 INJECTION, SOLUTION INTRAMUSCULAR; INTRAVENOUS at 07:19

## 2025-06-05 RX ADMIN — PROPOFOL 150 MG: 10 INJECTION, EMULSION INTRAVENOUS at 07:32

## 2025-06-05 RX ADMIN — SODIUM CHLORIDE, SODIUM LACTATE, POTASSIUM CHLORIDE, AND CALCIUM CHLORIDE: .6; .31; .03; .02 INJECTION, SOLUTION INTRAVENOUS at 07:06

## 2025-06-05 RX ADMIN — FENTANYL CITRATE 25 MCG: 50 INJECTION, SOLUTION INTRAMUSCULAR; INTRAVENOUS at 08:40

## 2025-06-05 RX ADMIN — ONDANSETRON 4 MG: 2 INJECTION, SOLUTION INTRAMUSCULAR; INTRAVENOUS at 07:33

## 2025-06-05 RX ADMIN — FENTANYL CITRATE 25 MCG: 50 INJECTION, SOLUTION INTRAMUSCULAR; INTRAVENOUS at 08:45

## 2025-06-05 RX ADMIN — BUPIVACAINE HYDROCHLORIDE 7 ML: 5 INJECTION, SOLUTION EPIDURAL; INTRACAUDAL; PERINEURAL at 07:20

## 2025-06-05 RX ADMIN — DEXAMETHASONE SODIUM PHOSPHATE 10 MG: 10 INJECTION INTRAMUSCULAR; INTRAVENOUS at 07:33

## 2025-06-05 RX ADMIN — PROPOFOL 50 MG: 10 INJECTION, EMULSION INTRAVENOUS at 07:33

## 2025-06-05 RX ADMIN — EPHEDRINE SULFATE 5 MG: 50 INJECTION INTRAVENOUS at 07:59

## 2025-06-05 RX ADMIN — LIDOCAINE HYDROCHLORIDE 50 MG: 10 INJECTION, SOLUTION EPIDURAL; INFILTRATION; INTRACAUDAL at 07:32

## 2025-06-05 RX ADMIN — FENTANYL CITRATE 25 MCG: 50 INJECTION INTRAMUSCULAR; INTRAVENOUS at 07:40

## 2025-06-05 RX ADMIN — FENTANYL CITRATE 25 MCG: 50 INJECTION INTRAMUSCULAR; INTRAVENOUS at 08:09

## 2025-06-05 NOTE — OP NOTE
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      A standard sinus tarsi incision was made from the tip of the fibula to the base of the 4th metatarsal.  Sharp dissection was carried through the skin and bovie electrocautery was used to control bleeding.  Deep retraction was performed.  The extensor digitorum brevis was lifted off of the calcaneus.  The peroneal tendons were identified and the sheaths were opened and the tendons were retracted and protected throughout the case.      The subtalar joint was identified and the interosseous ligaments were debrided.  Next, the anterior, middle, and posterior facets were denuded of cartilage from both sides of the joint using a combination of osteotomes, currettes, and rongeurs.  The wound was then copiously irrigated and the cartilage fragments were evacuated.  The joint preparation was carried medial and FHL movement was identified deep within the wound.      Next, the joint surfaces were drilled with a 2.5mm drill bit and then the surfaces  were cross-hatched with an osteotome. Allograft bone graft material was placed in the fusion site. The subtalar joint was held in compression and the guidepins for the screws were placed across the joint.  Fluoroscopy was brought in and the guidepins were adjusted until excellent position was achieved.      Next, the guidepins were sequentially measured, drilled, countersunk, and the appropriate size screws were placed.  There was excellent stability.      The wound was copiously irrigated and a small amount of vancomycin powder was applied.  The wound was closed with 2-0 vicryl, 4-0 vicryl, and 2-0 nylon.  The tourniquet was let down.  At the end of the case all toes were pink and warm.  A sterile dressing and well padded splint was placed in neutral dorsiflexion.       I was present for the entire procedure., A qualified resident physician was not available., and A physician assistant was required during the procedure for retraction, tissue handling, dissection and suturing.    Patient Disposition:  PACU          SIGNATURE: James R Lachman, MD  DATE: June 5, 2025  TIME: 7:12 AM

## 2025-06-05 NOTE — ANESTHESIA POSTPROCEDURE EVALUATION
Post-Op Assessment Note    CV Status:  Stable  Pain Score: 0    Pain management: adequate       Mental Status:  Sleepy and arousable   PONV Controlled:  None   Airway Patency:  Patent     Post Op Vitals Reviewed: Yes    No anethesia notable event occurred.    Staff: CRNA           Last Filed PACU Vitals:  Vitals Value Taken Time   Temp 97.9    Pulse 65    /55    Resp 12    SpO2 100

## 2025-06-05 NOTE — H&P
James R Lachman, M.D.  Attending, Orthopaedic Surgery  Foot and Ankle  [facility]        ORTHOPAEDIC FOOT AND ANKLE H+P     Assessment:   Left subtalar arthritis           Plan:   The patient verbalized understanding of exam findings and treatment plan. We engaged in the shared decision-making process and treatment options were discussed at length with the patient. Surgical and conservative management discussed today along with risks and benefits.  Left subtalar arthrodesis  Consent in chart  CONSENT FOR BONY PROCEDURES:   Patient understands that there is no guarantee that the surgery will relieve all of Her pain and also understands that there may be a prolonged course of protected weight-bearing status required which will restrict them from driving and other activities as discussed at today's visit. Patient recognizes that there are risks with surgery including bleeding, numbness, nerve irritation, wound complications, infection, continued pain, joint stiffness, malunion, nonunion, anesthetic complications, death, failure of procedure and possible need for further surgery. The patient understands that there is no guarantee that this surgery will relieve all of Her pain and symptoms.  Patient understands that there is no guarantee that they will return to full function after the procedure. Patient has provided informed consent for the procedure.                History of Present Illness:   Chief Complaint: Left hindfoot pain  Kavya Stevens is a 60 y.o. female who is being seen for left subtalar arthritis. Patient has failed all conservative treatment measures.  Pain is localized at sinus tarsi with minimal radiating and described as sharp and severe. Patient denies numbness, tingling or radicular pain.  Denies history of neuropathy.  Patient does not smoke, does not have diabetes and does not take blood thinners.  Patient denies family history of anesthesia complications and has not had  any complications with anesthesia.     Pain/symptom timing:  Worse during the day when active  Pain/symptom context:  Worse with activites and work  Pain/symptom modifying factors:  Rest makes better, activities make worse  Pain/symptom associated signs/symptoms: none    Prior treatment   NSAIDs Yes    Injections Yes   Bracing/Orthotics Yes   Physical Therapy No     Orthopedic Surgical History:   See below    Past Medical, Surgical and Social History:  Past Medical History:  has a past medical history of Abnormal Pap smear of cervix, Arthritis, Hyperlipidemia, Osteoarthritis (1993), and Raynaud disease.  Problem List: does not have any pertinent problems on file.  Past Surgical History:  has a past surgical history that includes Finger fracture surgery (Right, 1982); Tonsillectomy and adenoidectomy; Eye surgery (Bilateral); Bunionectomy (Left); Tendon repair (1982); and pr excision ganglion wrist dorsal/volar primary (Right, 12/23/2024).  Family History: family history includes Migraines in her son; Pulmonary embolism in her son. She was adopted.  Social History:  reports that she has never smoked. She has never used smokeless tobacco. She reports that she does not currently use alcohol. She reports that she does not use drugs.  Current Medications: Scheduled Meds:  Current Facility-Administered Medications   Medication Dose Route Frequency Provider Last Rate    bupivacaine liposomal  20 mL Infiltration Once Jeana Alvares MD      chlorhexidine  15 mL Swish & Spit Once James R Lachman, MD      chlorhexidine gluconate   Topical Daily PRN James R Lachman, MD      clindamycin  900 mg Intravenous Once James R Lachman, MD       Continuous Infusions:   PRN Meds:.  chlorhexidine gluconate    Allergies: is allergic to bee venom, neosporin [bacitracin-polymyxin b], medical tape, and penicillins.     Review of Systems:  General- denies fever/chills  HEENT- denies hearing loss or sore throat  Eyes- denies eye pain or visual  "disturbances, denies red eyes  Respiratory- denies cough or SOB  Cardio- denies chest pain or palpitations  GI- denies abdominal pain  Endocrine- denies urinary frequency  Urinary- denies pain with urination  Musculoskeletal- Negative except noted above  Skin- denies rashes or wounds  Neurological- denies dizziness or headache  Psychiatric- denies anxiety or difficulty concentrating    Physical Exam:   /56   Pulse 67   Temp 97.6 °F (36.4 °C) (Temporal)   Resp 18   Ht 5' 5\" (1.651 m)   Wt 80.3 kg (177 lb)   LMP  (LMP Unknown)   SpO2 97%   BMI 29.45 kg/m²   General/Constitutional: No apparent distress: well-nourished and well developed.  Eyes: normal ocular motion  Cardio: RRR, Normal S1S2, No m/r/g  Lymphatic: No appreciable lymphadenopathy  Respiratory: Non-labored breathing, CTA b/l no w/c/r  Abdominal: Soft and nontender  Vascular: No edema, swelling or tenderness, except as noted in detailed exam.  Integumentary: No impressive skin lesions present, except as noted in detailed exam.  Neuro: No ataxia or tremors noted  Psych: Normal mood and affect, oriented to person, place and time. Appropriate affect.  Musculoskeletal: Normal, except as noted in detailed exam and in HPI.    Examination    Left    Gait Antalgic   Musculoskeletal Tender to palpation at sinus tarsi    Skin Normal.      Nails Normal    Range of Motion  20 degrees dorsiflexion, 30 degrees plantarflexion  Subtalar motion: limited and painful    Stability Stable    Muscle Strength 5/5 tibialis anterior  5/5 gastrocnemius-soleus  5/5 posterior tibialis  5/5 peroneal/eversion strength  5/5 EHL  5/5 FHL    Neurologic Normal    Sensation Intact to light touch throughout sural, saphenous, superficial peroneal, deep peroneal and medial/lateral plantar nerve distributions.  [place] 5.07 filament (10g) testing deferred.    Cardiovascular Brisk capillary refill < 2 seconds,intact DP and PT pulses    Special Tests None      Imaging " Studies:   None New        James R. Lachman, MD  Foot & Ankle Surgery   Department of Orthopaedic Surgery  [facility]      I personally performed the service.    James R. Lachman, MD

## 2025-06-05 NOTE — ANESTHESIA PROCEDURE NOTES
Peripheral Block    Patient location during procedure: holding area  Start time: 6/5/2025 7:20 AM  Reason for block: at surgeon's request and post-op pain management  Staffing  Performed by: Jeana Alvares MD  Authorized by: Jeana Alvares MD    Preanesthetic Checklist  Completed: patient identified, IV checked, site marked, risks and benefits discussed, surgical consent, monitors and equipment checked, pre-op evaluation and timeout performed  Peripheral Block  Prep: ChloraPrep  Patient monitoring: frequent blood pressure checks, continuous pulse oximetry and heart rate  Block type: Popliteal  Laterality: left  Injection technique: single-shot  Procedures: ultrasound guided, Ultrasound guidance required for the procedure to increase accuracy and safety of medication placement and decrease risk of complications.  Ultrasound permanent image saved  bupivacaine (PF) (MARCAINE) 0.5 % injection 20 mL - Perineural   7 mL - 6/5/2025 7:20:00 AM  bupivacaine liposomal (EXPAREL) 1.3 % injection 20 mL - Perineural   20 mL - 6/5/2025 7:20:00 AM  Needle  Needle type: Stimuplex   Needle gauge: 20 G  Needle length: 4 in  Needle localization: anatomical landmarks and ultrasound guidance  Assessment  Injection assessment: incremental injection, frequent aspiration, injected with ease, negative aspiration, negative for heart rate change, no paresthesia on injection, no symptoms of intraneural/intravenous injection and needle tip visualized at all times  Paresthesia pain: none  Post-procedure:  site cleaned  patient tolerated the procedure well with no immediate complications

## 2025-06-05 NOTE — ANESTHESIA PREPROCEDURE EVALUATION
"Procedure:  Left subtalar joint arthrodesis (Left: Foot)    Takes zepbound, last dose 5/28/25    Relevant Problems   CARDIO   (+) Hyperlipidemia      MUSCULOSKELETAL   (+) Arthritis   (+) Arthritis of left subtalar joint   (+) Primary osteoarthritis of left ankle      Other   (+) Overweight with body mass index (BMI) of 29 to 29.9 in adult        Physical Exam    Airway     Mallampati score: II  TM Distance: >3 FB  Neck ROM: full      Cardiovascular      Dental       Pulmonary      Neurological    She appears awake, alert and oriented x3.      Other Findings  post-pubertal.      Anesthesia Plan  ASA Score- 2     Anesthesia Type- general and regional with ASA Monitors.         Additional Monitors:     Airway Plan: LMA, Oral ETT and LMA.    Comment: Adductor and popliteal nerve blocks for pain control.    Recent labs personally reviewed:  Lab Results       Component                Value               Date                       WBC                      5.38                04/26/2025                 HGB                      13.0                04/26/2025                 PLT                      201                 04/26/2025            Lab Results       Component                Value               Date                       K                        4.5                 04/26/2025                 BUN                      19                  04/26/2025                 CREATININE               0.89                04/26/2025            No results found for: \"PTT\"   No results found for: \"INR\"    Blood type     Patient consented for regional anesthesia with general anesthesia We discussed associated risks or nerve blocks including LAST, toxicity related to inadvertent intravascular injection, failed block, and expectations for duration of numbness/weakness/pain control. We also discussed risks of general anesthesia. All questions and concerns answered    I, Jeana Alvares MD, have personally seen and evaluated the patient prior " to anesthetic care.  I have reviewed the pre-anesthetic record, medical history, allergies, medications and any other medical records if appropriate to the anesthetic care.  If a CRNA is involved in the case, I have reviewed the CRNA assessment, if present, and agree.       .       Plan Factors-Exercise tolerance (METS): >4 METS.    Chart reviewed. EKG reviewed. Imaging results reviewed. Existing labs reviewed. Patient summary reviewed.    Patient is not a current smoker.  Patient did not smoke on day of surgery.    Obstructive sleep apnea risk education given perioperatively.        Induction- intravenous.    Postoperative Plan- Plan for postoperative opioid use.   Monitoring Plan - Monitoring plan - standard ASA monitoring  Post Operative Pain Plan - non-opiod analgesics, plan for postoperative opioid use and peripheral nerve block    Perioperative Resuscitation Plan - Level 1 - Full Code.       Informed Consent- Anesthetic plan and risks discussed with patient.  I personally reviewed this patient with the CRNA. Discussed and agreed on the Anesthesia Plan with the CRNA..      NPO Status:  No vitals data found for the desired time range.

## 2025-06-05 NOTE — ANESTHESIA POSTPROCEDURE EVALUATION
Post-Op Assessment Note    CV Status:  Stable    Pain management: adequate       Mental Status:  Alert and awake   Hydration Status:  Euvolemic   PONV Controlled:  Controlled   Airway Patency:  Patent  Two or more mitigation strategies used for obstructive sleep apnea   Post Op Vitals Reviewed: Yes    No anethesia notable event occurred.    Staff: Anesthesiologist, CRNA           Last Filed PACU Vitals:  Vitals Value Taken Time   Temp 97.9 °F (36.6 °C) 06/05/25 08:30   Pulse 66 06/05/25 09:00   /61 06/05/25 09:00   Resp 20 06/05/25 09:00   SpO2 98 % 06/05/25 09:00       Modified Jadiel:     Vitals Value Taken Time   Activity 2 06/05/25 08:45   Respiration 2 06/05/25 08:45   Circulation 2 06/05/25 08:45   Consciousness 1 06/05/25 08:45   Oxygen Saturation 2 06/05/25 08:45     Modified Jadiel Score: 9

## 2025-06-06 ENCOUNTER — TELEPHONE (OUTPATIENT)
Age: 60
End: 2025-06-06

## 2025-06-06 NOTE — TELEPHONE ENCOUNTER
Caller: Digna from [facility]    Doctor: Dr. Lachman    Reason for call: Requesting if surgery date 06/05/2025 and Post Op Date 06/25/2025

## 2025-06-11 ENCOUNTER — TELEPHONE (OUTPATIENT)
Dept: OBGYN CLINIC | Facility: HOSPITAL | Age: 60
End: 2025-06-11

## 2025-06-11 NOTE — TELEPHONE ENCOUNTER
Caller: pt     Doctor: Dr. Lachman     Reason for call: states she has been elevating her L foot PO for 1 week as instructed for 23 hrs. Would like to know since 1 week is now past. She would like to know if she should still be elevating as much as she has been or if she should be doing differently.     States she's had very minimal swelling     Please advise     Call back#: Phone: [phone]

## 2025-06-11 NOTE — TELEPHONE ENCOUNTER
Called and spoke to pt. She asked after 1 week of elevating her leg 23 hrs a day what should she be doing after 1 week. Advised she should still continue to elevate it at least until instructed otherwise at her post op appt. 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 She would need a work note to return 6/23/25. She did state she is following all the rules and doing well. She did state she had arthritis in her knee and it is very painful from the position she is in to keep it elevated so she is taking 3 motrin daily in the morning. Advised she should be taking tylenol and not motrin and explained rationale. She stated understanding and will try tylenol tomorrow morning. She is having no pain in her ankle just her knee. Please advise on work note. Thanks!

## 2025-06-12 NOTE — TELEPHONE ENCOUNTER
Caller: Patient     Doctor/Office: Dr Lachman     CB#: [phone]      What needs to be faxed: NEW revised work note stating when the patient's work from home restrictions will end or will be re-evaluated-next appt ( not counting the one on 6/25) is 7/8/25     ATTN to: REGLA Villarreal [employer]  Phone# [phone]    Fax#: [phone]

## 2025-06-17 ENCOUNTER — PATIENT MESSAGE (OUTPATIENT)
Dept: FAMILY MEDICINE CLINIC | Facility: CLINIC | Age: 60
End: 2025-06-17

## 2025-06-17 ENCOUNTER — NURSE TRIAGE (OUTPATIENT)
Age: 60
End: 2025-06-17

## 2025-06-17 DIAGNOSIS — L29.9 ITCHING: Primary | ICD-10-CM

## 2025-06-17 NOTE — TELEPHONE ENCOUNTER
Called and spoke with pt and relayed Dr Lachman's message, she stated understanding, no further questions.

## 2025-06-17 NOTE — TELEPHONE ENCOUNTER
Caller: Kavya (Patient)    Doctor: Dr. Lachman/Terry    Reason for call: Patient had surgery on 06/05/25. She was told to take 1,200 mg of calcium. She was not told what kind. She ordered Calcium Citracal slow release off line and started to take that. This is the only new thing she has added into her routine, she now has a rash all over her body. Her legs, Back, Stomach. She tried to take Benedryl which helped with itching but did not do anything with the rash. She did contact her PCP who may start her on steroids. She wanted to let you know.    Wanting to know if this was the calcium she is suppose to take?    She said this calcium states it has Citrate, Carbonate and D3. She said its definitely not the D3, because she has taken D3.   She is not sure if there is a different one. She said this is the first time she has had an allergy to anything other than PCN.      Said if there is something different you want her to try you can call it into [facility].    Call back#: [phone]

## 2025-06-17 NOTE — TELEPHONE ENCOUNTER
"REASON FOR CONVERSATION: Medication Problem and Rash    SYMPTOMS: Hives/itching    OTHER HEALTH INFORMATION: Recent ortho procedure and advised to take Calcium supplement to promote healing. Patient took Citracal 1200 mg daily and developed hives days later.    PROTOCOL DISPOSITION: Discuss With PCP and Callback by Nurse Today (overriding See Today in Office)    CARE ADVICE PROVIDED: At home care advice, stopped taking supplement, appointment offered. Advised to follow up with Ortho as well.     PRACTICE FOLLOW-UP: Patient declined an office visit due to recent procedure. She is NWB on her L foot and has a scooter. She will be sending pictures via [employer] and would like a call back to advise if there is another supplement she can take.       Reason for Disposition   Hives or itching    Answer Assessment - Initial Assessment Questions  1. NAME of MEDICINE: \"What medicine(s) are you calling about?\"      Citracal Calcium  2. QUESTION: \"What is your question?\" (e.g., double dose of medicine, side effect)      Side effect   3. PRESCRIBER: \"Who prescribed the medicine?\" Reason: if prescribed by specialist, call should be referred to that group.      OTC, recommended by ortho   4. SYMPTOMS: \"Do you have any symptoms?\" If Yes, ask: \"What symptoms are you having?\"  \"How bad are the symptoms (e.g., mild, moderate, severe)      Rash, moderate-severe   5. PREGNANCY:  \"Is there any chance that you are pregnant?\" \"When was your last menstrual period?\"      N/A    Protocols used: Medication Question Call-Adult-[place], Rash - Widespread On Drugs-Adult-[place]    "

## 2025-06-17 NOTE — TELEPHONE ENCOUNTER
Called and spoke to pt. She has a rash all over her body, no breathing difficulties. She has an allergy to bees so she has an Epi pen and knows the signs of anaphylaxis. She hasn't eaten anything different or changed her detergent or soaps. The only thing different is that she started taking Citracal for calcium. She has been taking benadryl and it hasn't improved. She stopped taking the Citracal this morning and contacted her PCP. They stated they may need to prescribe steroids to help clear up if benadryl alone isnt working. Advised to stop taking calcium for a few days and see if rash goes away and then if it does she will know its the calcium and if it doesn't then follow up with PCP. Pt stated understanding

## 2025-06-18 DIAGNOSIS — E66.3 OVERWEIGHT WITH BODY MASS INDEX (BMI) OF 29 TO 29.9 IN ADULT: ICD-10-CM

## 2025-06-18 RX ORDER — HYDROXYZINE HYDROCHLORIDE 10 MG/1
10 TABLET, FILM COATED ORAL EVERY 6 HOURS PRN
Qty: 30 TABLET | Refills: 0 | Status: SHIPPED | OUTPATIENT
Start: 2025-06-18

## 2025-06-18 NOTE — PATIENT COMMUNICATION
I sent Atarax which is a different antihistamine that she can use as needed for itching. Since ortho does not want her using steroids would recommend she can use calamine lotion as well.

## 2025-06-18 NOTE — TELEPHONE ENCOUNTER
How are you tolerating the medication?   [] Nausea  [] Vomiting  [] Diarrhea  [x] Asymptomatic  [] Other:    Last visit weight: she doesn't know     Current weight: 177 lbs when she went to the hospital earlier this month     Date of last injection: today     How many injections do you have left: 0    Pt would like the same dose of the zepbound sent to the [facility], pt is asking if this can be refilled asap as she has someone going today to  another medication and it's be easier if that person doesn't have to make more then one trip

## 2025-06-18 NOTE — PATIENT COMMUNICATION
Pt sent in some pictures of a rash that could have been reaction to citracal which she did stop. She is taking benadryl and is s/p surgery and ortho does not want her to be on a steroid. She is c/o rash being very itchy.    Please advise

## 2025-06-18 NOTE — PATIENT COMMUNICATION
Patient called again regarding her rash as she has not heard back yet.  She has been taking Benadryl which is helping a little bit but rash is very itchy and not going away.   Ortho does not want her to take a steroid since she is s/p surgery.   Patient has stopped taking the Citracal.  Patient asks for a call back today.

## 2025-06-19 RX ORDER — TIRZEPATIDE 7.5 MG/.5ML
7.5 INJECTION, SOLUTION SUBCUTANEOUS WEEKLY
Qty: 2 ML | Refills: 0 | Status: SHIPPED | OUTPATIENT
Start: 2025-06-19

## 2025-06-19 NOTE — TELEPHONE ENCOUNTER
Pt last seen 4/30/25   Upcoming appt scheduled for 7/24/25     Req sent on 6/18/25     How are you tolerating the medication?   [] Nausea  [] Vomiting  [] Diarrhea  [x] Asymptomatic  [] Other:     Last visit weight: she doesn't know      Current weight: 177 lbs when she went to the hospital earlier this month      Date of last injection: today (6/18/25)     How many injections do you have left: 0     Pt would like the same dose of the zepbound sent to the [facility], pt is asking if this can be refilled asap as she has someone going today to  another medication and it's be easier if that person doesn't have to make more then one trip.

## 2025-06-20 ENCOUNTER — PATIENT MESSAGE (OUTPATIENT)
Dept: FAMILY MEDICINE CLINIC | Facility: CLINIC | Age: 60
End: 2025-06-20

## 2025-06-20 NOTE — TELEPHONE ENCOUNTER
"Reason for Disposition  • MODERATE-SEVERE hives persist (i.e., hives interfere with normal activities or work) and taking antihistamine (e.g., cetirizine, fexofenadine, or loratadine) > 24 hours    Answer Assessment - Initial Assessment Questions  1. APPEARANCE: \"What does the rash look like?\"       Rash/hives   2. LOCATION: \"Where is the rash located?\"       All over   3. NUMBER: \"How many hives are there?\"       Unable to determine   4. SIZE: \"How big are the hives?\" (e.g., inches, cm, compare to coins) \"Do they all look the same or do they vary in shape and size?\"       Vary in size - tiny to quarter of an inch   5. ONSET: \"When did the hives begin?\" (e.g., hours or days ago)       6/17  6. ITCHING: \"Does it itch?\" If Yes, ask: \"How bad is the itch?\"  (e.g., none, mild, moderate, severe)      Yews moderate - severe   7. RECURRENT PROBLEM: \"Have you had hives before?\" If Yes, ask: \"When was the last time?\" and \"What happened that time?\"       NO  8. TRIGGERS: \"Were you exposed to any new food, plant, cosmetic product or animal just before the hives began?\"      NO- thought it was calcium   Last dose on Tuesday   9. OTHER SYMPTOMS: \"Do you have any other symptoms?\" (e.g., fever, tongue swelling, difficulty breathing, abdomen pain)      NO  10. PREGNANCY: \"Is there any chance you are pregnant?\" \"When was your last menstrual period?\"        No    Protocols used: Hives-Adult-[place]    "

## 2025-06-20 NOTE — TELEPHONE ENCOUNTER
REASON FOR CONVERSATION: Calcium Reaction, Rash, and Urticaria    SYMPTOMS: rash/hives ongoing since 6/17 per epic notes Patient suspected rash was due to medication: Calcium- last dose Tuesday 6/17  Used benadryl - relief for itching rash still present.   Presently using Atarax- no relief     OTHER HEALTH INFORMATION: is non weight baring due to recent surgery     PROTOCOL DISPOSITION: See Today in Office    CARE ADVICE PROVIDED: cont anti histamine, cool bath/compress     PRACTICE FOLLOW-UP: Patient requesting call back - no transportation and is non weight baring due to procedure   Please reference updated pictures via mychart and advise

## 2025-06-20 NOTE — PATIENT COMMUNICATION
Patient contacted the office back this afternoon in regards to rash. Asking if there is anything else that she can take to help from spreading. Aware steroids may help but she is unable to take confirmed with ortho due to the delay of healing process. If something else could be called in possibly please send to [facility] pharmacy [place].     Please contact patient back with an update, thank you.

## 2025-06-20 NOTE — PATIENT COMMUNICATION
There is nothing else that we can prescribe that would likely help other than steroids that she cannot take. It is not shingles. It should resolve soon if she has stopped taking the medication she thought started it but if not we can refer her to allergist or dermatologist if it does not improve.

## 2025-06-25 ENCOUNTER — OFFICE VISIT (OUTPATIENT)
Dept: OBGYN CLINIC | Facility: CLINIC | Age: 60
End: 2025-06-25

## 2025-06-25 VITALS — BODY MASS INDEX: 29.49 KG/M2 | WEIGHT: 177 LBS | HEIGHT: 65 IN

## 2025-06-25 DIAGNOSIS — M19.072 ARTHRITIS OF LEFT SUBTALAR JOINT: Primary | ICD-10-CM

## 2025-06-25 PROCEDURE — 99024 POSTOP FOLLOW-UP VISIT: CPT | Performed by: ORTHOPAEDIC SURGERY

## 2025-06-25 NOTE — PROGRESS NOTES
James R Lachman, M.D.  Attending, Orthopaedic Surgery  Foot and Ankle  [facility]      ORTHOPAEDIC FOOT AND ANKLE POST-OP VISIT     Procedure:      Left subtalar joint arthrodesis        Date of surgery:   6/5/2025    Assessment & Plan  Arthritis of left subtalar joint  1. Weightbearing Status - NWB operative extremity in cast  2. DVT prophylaxis - ASA 81 mg BID  3. Continue to elevate 23 hrs/day getting up 1x per hour to prevent a blood clot  4. Pain control - OTC pain medication  5. RTC in 3 week(s)  6. Xrays needed next visit - Yes weightbearing ankle         History of Present Illness:   Chief Complaint:   Chief Complaint   Patient presents with    Post-op     Post op 3 weeks. Splint off and stiches out.          Kavya Stevens is a 60 y.o. female who is being seen for 3 week post-operative visit for the above procedure. Pain is well controlled and the patient has successfully transitioned to OTC pain medicines.  she is taking ASA 81 mg BID for DVT prophylaxis. Patient has been NWB in a Splint      Review of Systems:  General- denies fever/chills  Respiratory- denies cough or SOB  Cardio- denies chest pain or palpitations  GI- denies abdominal pain  Musculoskeletal- Negative except noted above  Skin- denies rashes or wounds    Physical Exam:   LMP  (LMP Unknown)   General/Constitutional: No apparent distress: well-nourished and well developed.  Eyes: normal ocular motion  Lymphatic: No appreciable lymphadenopathy  Respiratory: Non-labored breathing  Vascular: No edema, swelling or tenderness, except as noted in detailed exam.  Integumentary: No impressive skin lesions present, except as noted in detailed exam.  Neuro: No ataxia or tremors noted  Psych: Normal mood and affect, oriented to person, place and time. Appropriate affect.  Musculoskeletal: Normal, except as noted in detailed exam and in HPI.    Examination    left        Incision Clean, dry, intact  Sutures Removed this visit     Ecchymosis none    Swelling mild    Sensation Intact to light touch throughout sural, saphenous, superficial peroneal, deep peroneal and medial/lateral plantar nerve distributions.  [place]-Eber 5.07 filament (10g) testing deferred.    Cardiovascular Brisk capillary refill < 2 seconds,intact DP and PT pulses    Special Tests None      Imaging Studies:   No new images      Scribe Attestation      I,:  Vernell Childers PA-C am acting as a scribe while in the presence of the attending physician.:       I,:  James R Lachman, MD personally performed the services described in this documentation    as scribed in my presence.:                James R. Lachman, MD  Foot & Ankle Surgery   Department of Orthopaedic Surgery  [facility]      I personally performed the service.    James R. Lachman, MD

## 2025-06-25 NOTE — PATIENT INSTRUCTIONS
"Non-weightbearing in cast    Continue aspirin/lovenox for blood clot prevention    Purchase a Compression stocking (Knee high, 20-30mm Hg) to bring to next visit  Recommend taking the following supplements: Vitamin D3- 4000 units per day and Calcium 1200 mg per day. This will help with bone healing.         Care of Casts and Splints    Casts and splints support and protect injured bones and soft tissue. When you break a bone, your doctor will put the pieces back together in the right position. Casts and splints hold the bones in place while they heal. They also reduce pain, swelling, and muscle spasm.    In some cases, splints and casts are applied following surgery.    Splints or \"half-casts\" provide less support than casts. However, splints can be adjusted to accommodate swelling from injuries easier than enclosed casts. Your doctor will decide which type of support is best for you.    Types of Splints and Casts  Casts are custom-made. They must fit the shape of your injured limb correctly to provide the best support. Casts can be made of plaster or fiberglass -- a plastic that can be shaped.  Splints or half-casts can also be custom-made, especially if an exact fit is necessary. Other times, a ready-made splint will be used. These off-the-shelf splints are made in a variety of shapes and sizes, and are much easier and faster to use. They have Velcro straps which make the splints easy to put on, take off, and adjust.    Materials  Fiberglass or plaster materials form the hard supportive layer in splints and casts.  Fiberglass is lighter in weight and stronger than plaster. In addition, x-rays can \"see through\" fiberglass better than through plaster. This is important because your doctor will probably schedule additional x-rays after your splint or cast has been applied. X-rays can show whether the bones are healing well or have moved out of place.  Plaster is less expensive than fiberglass and shapes better than " "fiberglass for some uses.    Application  Both fiberglass and plaster splints and casts use padding, usually cotton, as a protective layer next to the skin. Both materials come in strips or rolls which are dipped in water and applied over the padding covering the injured area.  The splint or cast must fit the shape of the injured arm or leg correctly to provide the best possible support. Generally, the splint or cast also covers the joint above and below the broken bone.  In many cases, a splint is applied to a fresh injury first. As swelling subsides, a full cast may replace the splint.  Sometimes, it may be necessary to replace a cast as swelling goes down and the cast gets \"too big.\" As a fracture heals, the cast may be replaced by a splint to make it easier to perform physical therapy exercises.        Apply ice to the splint or cast and elevate your leg to reduce swelling.  Warning Signs  Swelling can create a lot of pressure under your cast. This can lead to problems. If you experience any of the following symptoms, contact your doctor's office immediately for advice.  Increased pain and the feeling that the splint or cast is too tight. This may be caused by swelling.   Numbness and tingling in your hand or foot. This may be caused by too much pressure on the nerves.   Burning and stinging. This may be caused by too much pressure on the skin.   Excessive swelling below the cast. This may mean the cast is slowing your blood circulation.   Loss of active movement of toes or fingers. This requires an urgent evaluation by your doctor.      Getting Used to a Splint or Cast  Swelling due to your injury may cause pressure in your splint or cast for the first 48 to 72 hours. This may cause your injured arm or leg to feel snug or tight in the splint or cast. If you have a splint, your doctor will show you how to adjust it to accommodate the swelling.  It is very important to keep the swelling down. This will lessen " "pain and help your injury heal. To help reduce swelling:  Elevate. It is very important to elevate your injured arm or leg for the first 24 to 72 hours. Prop your injured arm or leg up above your heart by putting it on pillows or some other support. You will have to recline if the splint or cast is on your leg. Elevation allows clear fluid and blood to drain \"downhill\" to your heart.   Exercise. Move your uninjured, but swollen fingers or toes gently and often. Moving them often will prevent stiffness.   Ice. Apply ice to the splint or cast. Place the ice in a dry plastic bag or ice pack and loosely wrap it around the splint or cast at the level of the injury. Ice that is packed in a rigid container and touches the cast at only one point will not be effective.  Taking Care of Your Splint or Cast  Your doctor will explain any restrictions on using your injured arm or leg while it is healing. You must follow your doctor's instructions carefully to make sure your bone heals properly. The following information provides general guidelines only, and is not a substitute for your doctor's advice.  After you have adjusted to your splint or cast for a few days, it is important to keep it in good condition. This will help your recovery.  Keep your splint or cast dry. Moisture weakens plaster and damp padding next to the skin can cause irritation. Use two layers of plastic or purchase waterproof shields to keep your splint or cast dry while you shower or bathe. Even if the cast is covered, do not submerge it or hold it under running water. A small pinhole in the cast cover can cause the injury to get soaked.   Walking casts. Do not walk on a \"walking cast\" until it is completely dry and hard. It takes about one hour for fiberglass, and two to three days for plaster to become hard enough to walk on.   Avoid dirt. Keep dirt, sand, and powder away from the inside of your splint or cast.   Padding. Do not pull out the padding from " "your splint or cast.   Itching. Do not stick objects such as coat hangers inside the splint or cast to scratch itching skin. Do not apply powders or deodorants to itching skin. If itching persists, contact your doctor.   Trimming. Do not break off rough edges of the cast or trim the cast before asking your doctor.   Skin. Inspect the skin around the cast. If your skin becomes red or raw around the cast, contact your doctor.   Inspect the cast regularly. If it becomes cracked or develops soft spots, contact your doctor's office.  Use common sense. You have a serious injury and you must protect your cast from damage so it can protect your injury while it heals.  After the initial swelling has subsided, proper splint or cast support will usually allow you to continue your daily activities with a minimum of inconvenience.  Cast Removal  Never remove the cast yourself. You may cut your skin or prevent proper healing of your injury.  Your doctor will use a cast saw to remove your cast. The saw vibrates, but does not rotate. If the blade of the saw touches the padding inside the hard shell of the cast, the padding will vibrate with the blade and will protect your skin. Cast saws make noise and may feel \"hot\" from friction, but will not harm you -- \"their bark is worse than their bite.\"  If you do feel pain while the cast is being removed, let your doctor or an assistant know and they will be able to make adjustments.  The saw vibrates but does not rotate. Cast saws make noise but will not harm you.     "

## 2025-06-25 NOTE — ASSESSMENT & PLAN NOTE
1. Weightbearing Status - NWB operative extremity in cast  2. DVT prophylaxis - ASA 81 mg BID  3. Continue to elevate 23 hrs/day getting up 1x per hour to prevent a blood clot  4. Pain control - OTC pain medication  5. RTC in 3 week(s)  6. Xrays needed next visit - Yes weightbearing ankle

## 2025-07-16 ENCOUNTER — OFFICE VISIT (OUTPATIENT)
Dept: OBGYN CLINIC | Facility: CLINIC | Age: 60
End: 2025-07-16

## 2025-07-16 ENCOUNTER — APPOINTMENT (OUTPATIENT)
Dept: RADIOLOGY | Facility: AMBULARY SURGERY CENTER | Age: 60
End: 2025-07-16
Attending: ORTHOPAEDIC SURGERY
Payer: COMMERCIAL

## 2025-07-16 VITALS — WEIGHT: 177 LBS | BODY MASS INDEX: 29.49 KG/M2 | HEIGHT: 65 IN

## 2025-07-16 DIAGNOSIS — Z01.89 ENCOUNTER FOR LOWER EXTREMITY COMPARISON IMAGING STUDY: ICD-10-CM

## 2025-07-16 DIAGNOSIS — M19.072 ARTHRITIS OF LEFT SUBTALAR JOINT: ICD-10-CM

## 2025-07-16 DIAGNOSIS — M19.072 ARTHRITIS OF LEFT SUBTALAR JOINT: Primary | ICD-10-CM

## 2025-07-16 PROCEDURE — 73600 X-RAY EXAM OF ANKLE: CPT

## 2025-07-16 PROCEDURE — 73610 X-RAY EXAM OF ANKLE: CPT

## 2025-07-16 PROCEDURE — 99024 POSTOP FOLLOW-UP VISIT: CPT | Performed by: ORTHOPAEDIC SURGERY

## 2025-07-16 NOTE — PROGRESS NOTES
"      James R Lachman, M.D.  Attending, Orthopaedic Surgery  Foot and Ankle  [facility]      ORTHOPAEDIC FOOT AND ANKLE POST-OP VISIT     Procedure:     ***       Date of surgery:   ***      PLAN  1. Weightbearing Status- {With/without:53539::\"NWB\"} operative extremity  2. DVT prophylaxis- {With/without:73527::\"ASA 325mg BID\"}  3. Continue to elevate 23hrs/day getting up 1x per hour to prevent a blood clot  4. Pain control- OTC pain medication  5. RTC in {#:73499::\"3\"} {:6122::\"weeks\"}  6. Xrays needed next visit - {:20200::\"Yes\"} Weightbearing {With/without:56521::\"Ankle\"}    History of Present Illness:   Chief Complaint:   Chief Complaint   Patient presents with    Post-op     Post op 6 weeks. Cast off and xrays.      Kavya Stevens is a 60 y.o. female who is being seen for post-operative visit for the above procedure. Pain is well controlled and the patient has successfully transitioned to OTC pain medicines.  she is taking ***ASA 325mg BID for DVT prophylaxis. Patient has been { :30170::\"NWB\"} in a {With/without:69098::\"Splint\"}.      Review of Systems:  General- denies fever/chills  Respiratory- denies cough or SOB  Cardio- denies chest pain or palpitations  GI- denies abdominal pain  Musculoskeletal- Negative except noted above  Skin- denies rashes or wounds    Physical Exam:   Ht 5' 5\" (1.651 m)   Wt 80.3 kg (177 lb)   LMP  (LMP Unknown)   BMI 29.45 kg/m²   General/Constitutional: No apparent distress: well-nourished and well developed.  Eyes: normal ocular motion  Lymphatic: No appreciable lymphadenopathy  Respiratory: Non-labored breathing  Vascular: No edema, swelling or tenderness, except as noted in detailed exam.  Integumentary: No impressive skin lesions present, except as noted in detailed exam.  Neuro: No ataxia or tremors noted  Psych: Normal mood and affect, oriented to person, place and time. Appropriate affect.  Musculoskeletal: Normal, except as noted in detailed exam and " "in HPI.    Examination    {:31998::\"Right\"}        Incision Clean, dry, intact  Sutures {With/without:99297::\"Removed this visit\"}    Ecchymosis {Desc; none/mild/moderate/severe:74872::\"none\"}    Swelling {97298::\"Mild\"}    Sensation Intact to light touch throughout sural, saphenous, superficial peroneal, deep peroneal and medial/lateral plantar nerve distributions.  [place]-Eber 5.07 filament (10g) testing deferred.    Cardiovascular Brisk capillary refill < 2 seconds,intact DP and PT pulses    Special Tests None      Imaging Studies:   *** views of the *** were taken, reviewed and interpreted independently that demonstrate ***. Reviewed by me personally.        James R. Lachman, MD  Foot & Ankle Surgery   Department of Orthopaedic Surgery  [facility]      I personally performed the service.    James R. Lachman, MD   "

## 2025-07-16 NOTE — PATIENT INSTRUCTIONS
4 days of partial weight bearing 50%  Then, 4 days of partial weight bearing 75%  Then, 4 days of partial weight bearing 90%  Then 7/28, full weight bearing in the boot and wean your crutches/rolling walker.    Then 2 weeks of weightbearing as tolerated in the CAM boot.    You may begin weaning your boot and transitioning to a sneaker (8/11). It is important to do this gradually to avoid aggravating the healing process.    8/11, you may come out of the boot into a sneaker for 2 hours.  2. 8/12, you may come out of the boot into a sneaker for 4 hours,  3. The next day, you may come out of the boot into a sneaker for 6 hours.  4. Continue this (adding 2 hours per day) as you tolerate. For example, if you do 6 hours out of the boot into a sneaker and your foot swells more than usual at night and it is difficult to control the discomfort, do not advance to 8 hours the next day, stay at 6 hours until you are able to tolerate it.    It is essential to follow this protocol and not modify this.  No matter when you begin weaning the boot, it will be difficult and there will be swelling and soreness.  If you spend more time than is recommended in the boot, this process becomes longer and more painful.    Elevation, Ice and tylenol and staying off of it at night will be important to aide in this transition out of the boot. Swelling and soreness are normal as you begin to do more with the injured leg.    May DC aspirin/lovenox, no longer needed.  May shower  Start PT  Scar massage- pea sized amount of lotion, massage into scar for 5 minutes each day.  Compression stocking (Knee high, 20-30mm Hg) to be worn at all times while awake.  Recommend taking the following supplements: Vitamin D3- 4000 units per day and Calcium 1200 mg per day. This will help with bone healing.

## 2025-07-16 NOTE — PROGRESS NOTES
"      James R Lachman, M.D.  Attending, Orthopaedic Surgery  Foot and Ankle  [facility]      ORTHOPAEDIC FOOT AND ANKLE POST-OP VISIT     Procedure:     Left subtalar joint arthrodesis       Date of surgery:   6/5/2025      PLAN  1. Weightbearing Status- PWB operative extremity  2. DVT prophylaxis- ASA 81mg BID  3. Continue to elevate 23hrs/day getting up 1x per hour to prevent a blood clot  4. Pain control- OTC pain medication  5. RTC in 6 week(s)  6. Xrays needed next visit - yes Weightbearing Ankle    History of Present Illness:   Chief Complaint:   Chief Complaint   Patient presents with    Post-op     Post op 6 weeks. Cast off and xrays.      Kavya Stevens is a 60 y.o. female who is being seen 6 weeks for post-operative visit for the above procedure. Pain is well controlled and the patient has successfully transitioned to OTC pain medicines.  she is taking ASA 81mg BID for DVT prophylaxis. Patient has been NWB in a Splint.      Review of Systems:  General- denies fever/chills  Respiratory- denies cough or SOB  Cardio- denies chest pain or palpitations  GI- denies abdominal pain  Musculoskeletal- Negative except noted above  Skin- denies rashes or wounds    Physical Exam:   Ht 5' 5\" (1.651 m)   Wt 80.3 kg (177 lb)   LMP  (LMP Unknown)   BMI 29.45 kg/m²   General/Constitutional: No apparent distress: well-nourished and well developed.  Eyes: normal ocular motion  Lymphatic: No appreciable lymphadenopathy  Respiratory: Non-labored breathing  Vascular: No edema, swelling or tenderness, except as noted in detailed exam.  Integumentary: No impressive skin lesions present, except as noted in detailed exam.  Neuro: No ataxia or tremors noted  Psych: Normal mood and affect, oriented to person, place and time. Appropriate affect.  Musculoskeletal: Normal, except as noted in detailed exam and in HPI.    Examination    left        Incision Clean, dry, intact  Sutures Previously removed.  "   Ecchymosis none    Swelling Mild    Sensation Intact to light touch throughout sural, saphenous, superficial peroneal, deep peroneal and medial/lateral plantar nerve distributions.  [place]-Eber 5.07 filament (10g) testing deferred.    Cardiovascular Brisk capillary refill < 2 seconds,intact DP and PT pulses    Special Tests None      Imaging Studies:   3 views of the left ankle were taken, reviewed and interpreted independently that demonstrate surgical hardware that is well aligned position with no signs of loosening.  No lytic or blastic lesions. Reviewed by me personally.        James R. Lachman, MD  Foot & Ankle Surgery   Department of Orthopaedic Surgery  [facility]      I personally performed the service.    James R. Lachman, MD

## 2025-07-17 DIAGNOSIS — E66.3 OVERWEIGHT WITH BODY MASS INDEX (BMI) OF 29 TO 29.9 IN ADULT: ICD-10-CM

## 2025-07-17 NOTE — TELEPHONE ENCOUNTER
Reason for call:   [x] Refill   [] Prior Auth  [] Other:     Office:   [x] PCP/Provider -   [] Specialty/Provider -     Medication: Zepbound 7.5 MG/0.5ML auto-injector     Dose/Frequency: Inject 0.5 mL (7.5 mg total) under the skin once a week     Quantity: 2 mL    Pharmacy: [facility]   Does the patient have enough for 3 days?   [x] Yes   [] No - Send as HP to POD

## 2025-07-18 RX ORDER — TIRZEPATIDE 7.5 MG/.5ML
7.5 INJECTION, SOLUTION SUBCUTANEOUS WEEKLY
Qty: 2 ML | Refills: 0 | Status: SHIPPED | OUTPATIENT
Start: 2025-07-18 | End: 2025-07-24 | Stop reason: SDUPTHER

## 2025-07-18 NOTE — TELEPHONE ENCOUNTER
Pt last seen 4/30/25, has appt 7/24/25.    Pharmacy requesting:  Zepbound 7.5 MG/0.5ML auto-injector.

## 2025-07-21 ENCOUNTER — TELEPHONE (OUTPATIENT)
Age: 60
End: 2025-07-21

## 2025-07-21 ENCOUNTER — TELEPHONE (OUTPATIENT)
Dept: FAMILY MEDICINE CLINIC | Facility: CLINIC | Age: 60
End: 2025-07-21

## 2025-07-21 NOTE — TELEPHONE ENCOUNTER
PA for Zepbound 7.5mg/0.5mL SUBMITTED to [employer]    via    []CMM-KEY:   [x]Surescripts-Case ID # [id number]   []Availity-Auth ID # NDC #   []Faxed to plan   []Other website   []Phone call Case ID #     [x]PA sent as URGENT    All office notes, labs and other pertaining documents and studies sent. Clinical questions answered. Awaiting determination from insurance company.     Turnaround time for your insurance to make a decision on your Prior Authorization can take 7-21 business days.

## 2025-07-21 NOTE — TELEPHONE ENCOUNTER
Reason for call:   [x] Prior Auth  [] Other:     Caller:  [x] Patient  [] Pharmacy  Name:   Address:   Callback Number:     Medication: tirzepatide (Zepbound) 7.5 mg/0.5 mL auto-injector   7.5mg  Dose/Frequency: 7.5 mg    Quantity: Inject 0.5 mL (7.5 mg total) under the skin once a week     Ordering Provider:   [x] PCP/Provider -   [] Speciality/Provider -     Has the patient tried other medications and failed? If failed, which medications did they fail?    [] No   [] Yes -     Is the patient's insurance updated in EPIC?   [] Yes   [] No     Is a copy of the patient's insurance scanned in EPIC?   [x] Yes   [] No

## 2025-07-21 NOTE — TELEPHONE ENCOUNTER
Patient called in and stated that the PA was not yet submitted, as per the pharmacy.  She needs this refill for her next shot this week.  She is asking that we call her as soon as the PA is approved.

## 2025-07-22 ENCOUNTER — EVALUATION (OUTPATIENT)
Dept: PHYSICAL THERAPY | Facility: REHABILITATION | Age: 60
End: 2025-07-22
Attending: ORTHOPAEDIC SURGERY
Payer: COMMERCIAL

## 2025-07-22 DIAGNOSIS — M19.072 ARTHRITIS OF LEFT SUBTALAR JOINT: ICD-10-CM

## 2025-07-22 PROCEDURE — 97110 THERAPEUTIC EXERCISES: CPT | Performed by: PHYSICAL THERAPIST

## 2025-07-22 PROCEDURE — 97161 PT EVAL LOW COMPLEX 20 MIN: CPT | Performed by: PHYSICAL THERAPIST

## 2025-07-22 NOTE — TELEPHONE ENCOUNTER
Patient calling to check status of prior auth. States she is due for her next injection in a day or two.  Advised PA was sent to insurance on 7/21, but response has not yet been received.

## 2025-07-22 NOTE — PROGRESS NOTES
PT Evaluation     Today's date: 2025  Patient name: Kavya Stevens  : 1965  MRN: [id number]  Referring provider: Lachman, James R, MD  Dx:   Encounter Diagnosis     ICD-10-CM    1. Arthritis of left subtalar joint  M19.072 Ambulatory Referral to Physical Therapy                     Assessment  Impairments: abnormal or restricted ROM, impaired physical strength, lacks appropriate home exercise program and pain with function    Assessment details: Kavya Stevens is a 60 y.o. female who presents with pain, decreased strength, and decreased ROM.  Due to these impairments, patient has difficulty performing a/iadls and recreational activities.  Patient's clinical presentation is consistent with their referring diagnosis of S/P left subtalar joint arthrodesis.  Patient would benefit from skilled physical therapy to address their aforementioned impairments, improve their level of function and to improve their overall quality of life.     Goals  Short term goals - to be achieved in 4 weeks:     Decrease pain 20-50%.   Improve range of motion by 25%.    Long term goals - to be achieved by discharge:    Ambulation is improved to maximal level of function.   Squatting is improved to maximal level of function.    Stair climbing is improved to maximal level of function.    IADL performance in related activities is improved to maximal level of function.    Performance in related household activities is improved to maximal level of function.     Plan    Planned therapy interventions: manual therapy, neuromuscular re-education, patient/caregiver education, strengthening, stretching, therapeutic activities, therapeutic exercise, home exercise program and gait training    Frequency: 1-2x week  Duration in weeks: 8  Plan of Care beginning date: 2025  Plan of Care expiration date: 2025  Treatment plan discussed with: patient        Subjective Evaluation    History of Present Illness  Mechanism of injury: Patient  refers to PT S/P left subtalar joint arthrodesis on 25.  Patient is currently ambulating with rolling walker 75% weightbearing on her left LE.  Patient [place] a ranch home; states she has a ramp to enter her home.  Patient states minimal pain in her right ankle at the current time; states she has discomfort on her plantar heel when weightbearing on her left LE.   Patent states intermittent tingling in her left foot and all digits of her left foot.  Patient works for [employer]; is currently working from home at this time.   Pain  Current pain ratin  At best pain ratin  At worst pain rating: 3          Objective     Active Range of Motion   Left Ankle/Foot   Dorsiflexion (kf): 0 degrees   Plantar flexion: 20 degrees   Inversion: 12 degrees   Eversion: 5 degrees     Passive Range of Motion   Left Ankle/Foot    Dorsiflexion (kf): 3 degrees   Plantar flexion: 25 degrees   Inversion: 15 degrees   Eversion: 10 degrees     Strength/Myotome Testing     Left Knee   Flexion: 4+  Extension: 4    Right Knee   Flexion: 5  Extension: 5    Additional Strength Details  Left ankle strength NT secondary to recent surgery             Precautions: Left subtalar joint arthrodesis - 25, Follow WB protocol below  4 days of partial weight bearing 50%  Then, 4 days of partial weight bearing 75%  Then, 4 days of partial weight bearing 90%  Then , full weight bearing in the boot and wean your crutches/rolling walker.     Then 2 weeks of weightbearing as tolerated in the CAM boot.     You may begin weaning your boot and transitioning to a sneaker (). It is important to do this gradually to avoid aggravating the healing process.     , you may come out of the boot into a sneaker for 2 hours.  2. , you may come out of the boot into a sneaker for 4 hours,  3. The next day, you may come out of the boot into a sneaker for 6 hours.  4. Continue this (adding 2 hours per day) as you tolerate.  For example, if you do 6 hours out of the boot into a sneaker and your foot swells more than usual at night and it is difficult to control the discomfort, do not advance to 8 hours the next day, stay at 6 hours until you are able to tolerate it.    Manuals 7/22            Left ankle PROM                                                    Neuro Re-Ed                                                                                                        Ther Ex             Ankle ABC's HEP            Strap Gastroc stretch HEP            Strap Soleus stretch HEP            Sitting heel slides for DF ROM             Sitting HR/TR             TB ankle x 4             Weight shifting in CAM boot                          Ther Activity                                       Gait Training                                       Modalities

## 2025-07-23 NOTE — TELEPHONE ENCOUNTER
Patient called to check the status of Prior Auth. Informed patient that it is awaiting determination from the insurance company. Patient stated that she is out of the medication and is going to call the insurance company to find out the status on their end.

## 2025-07-23 NOTE — TELEPHONE ENCOUNTER
PA for Zepbound 7.5mg/0.5mL APPROVED     Date(s) approved 07/21/2025-07/21/2026    Case #[id number]     Patient advised by          [x]MyChart Message  []Phone call   []LMOM  []L/M to call office as no active Communication consent on file  []Unable to leave detailed message as VM not approved on Communication consent       Pharmacy advised by    [x]Fax  []Phone call  []Secure Chat    Approval letter scanned into Media No waiting for letter

## 2025-07-24 ENCOUNTER — OFFICE VISIT (OUTPATIENT)
Dept: FAMILY MEDICINE CLINIC | Facility: CLINIC | Age: 60
End: 2025-07-24
Payer: COMMERCIAL

## 2025-07-24 VITALS
BODY MASS INDEX: 28.99 KG/M2 | HEART RATE: 67 BPM | DIASTOLIC BLOOD PRESSURE: 60 MMHG | TEMPERATURE: 97.7 F | RESPIRATION RATE: 16 BRPM | HEIGHT: 65 IN | OXYGEN SATURATION: 97 % | SYSTOLIC BLOOD PRESSURE: 106 MMHG | WEIGHT: 174 LBS

## 2025-07-24 DIAGNOSIS — E78.2 MIXED HYPERLIPIDEMIA: ICD-10-CM

## 2025-07-24 DIAGNOSIS — M19.072 ARTHRITIS OF LEFT SUBTALAR JOINT: ICD-10-CM

## 2025-07-24 DIAGNOSIS — E66.3 OVERWEIGHT WITH BODY MASS INDEX (BMI) OF 28 TO 28.9 IN ADULT: Primary | ICD-10-CM

## 2025-07-24 PROCEDURE — 99214 OFFICE O/P EST MOD 30 MIN: CPT | Performed by: NURSE PRACTITIONER

## 2025-07-24 RX ORDER — TIRZEPATIDE 7.5 MG/.5ML
7.5 INJECTION, SOLUTION SUBCUTANEOUS WEEKLY
Qty: 6 ML | Refills: 0 | Status: SHIPPED | OUTPATIENT
Start: 2025-07-24

## 2025-07-24 NOTE — ASSESSMENT & PLAN NOTE
- Down 3 pounds since last visit.  - Starting weight: 254 lb.    Current weight: 174 lb.    Total weight loss: 80 lb.   - Continue Zepbound 7.5 mg weekly. Discussed side effects.  - Encourage healthy diet and exercise as tolerated (recent ankle surgery)  - Recommend follow up in 3 months.    Orders:  •  tirzepatide (Zepbound) 7.5 mg/0.5 mL auto-injector; Inject 0.5 mL (7.5 mg total) under the skin once a week   0

## 2025-07-24 NOTE — ASSESSMENT & PLAN NOTE
- Underwent surgery in June.  - She is attending physical therapy.  - Continue follow up with Orthopedic Surgery.

## 2025-07-24 NOTE — PROGRESS NOTES
Name: Kavya Stevens      : 1965      MRN: [id number]  Encounter Provider: CONSTANCE Aguiar  Encounter Date: 2025   Encounter department: [facility]  :  Assessment & Plan  Overweight with body mass index (BMI) of 28 to 28.9 in adult  - Down 3 pounds since last visit.  - Starting weight: 254 lb.    Current weight: 174 lb.    Total weight loss: 80 lb.   - Continue Zepbound 7.5 mg weekly. Discussed side effects.  - Encourage healthy diet and exercise as tolerated (recent ankle surgery)  - Recommend follow up in 3 months.    Orders:  •  tirzepatide (Zepbound) 7.5 mg/0.5 mL auto-injector; Inject 0.5 mL (7.5 mg total) under the skin once a week    Arthritis of left subtalar joint  - Underwent surgery in .  - She is attending physical therapy.  - Continue follow up with Orthopedic Surgery.       Mixed hyperlipidemia  Component      Latest Ref Rng 2024   Cholesterol      See Comment mg/dL 229 (H)  260 (H)    Triglycerides      See Comment mg/dL 88  82    HDL      >=50 mg/dL 68  69    LDL Calculated      0 - 100 mg/dL 143 (H)  175 (H)       - LDL not controlled. Her HDL is good.   - Continue low fat diet, regular exercise, and continued weight loss efforts.   - Will continue to monitor fasting lipid panel.               History of Present Illness   Patient with PMH of HLD and obesity presents to office today for routine follow up. She is taking her prescribed medications and reports no side effects. She is on Zepbound for weight loss. She is down 3 pounds since last visit. She is down a total of 80 pounds since starting the medication. She is doing well on the 7.5 mg. Couldn't tolerate side effects from the 10 mg. She had recent left ankle surgery. She is currently attending PT and following with Orthopedic Surgery. She denies any other concerns or complaints today.           Review of Systems   Constitutional:  Negative for fatigue and fever.   HENT:  Negative  "for trouble swallowing.    Eyes:  Negative for visual disturbance.   Respiratory:  Negative for cough and shortness of breath.    Cardiovascular:  Negative for chest pain and palpitations.   Gastrointestinal:  Negative for abdominal pain and blood in stool.   Endocrine: Negative for cold intolerance and heat intolerance.   Genitourinary:  Negative for difficulty urinating and dysuria.   Skin:  Negative for rash.   Neurological:  Negative for dizziness, syncope and headaches.   Hematological:  Negative for adenopathy.   Psychiatric/Behavioral:  Negative for behavioral problems.        Objective   /60 (BP Location: Left arm, Patient Position: Sitting, Cuff Size: Large)   Pulse 67   Temp 97.7 °F (36.5 °C) (Tympanic)   Resp 16   Ht 5' 5\" (1.651 m)   Wt 78.9 kg (174 lb)   LMP  (LMP Unknown)   SpO2 97%   BMI 28.96 kg/m²      Physical Exam  Vitals and nursing note reviewed.   Constitutional:       Appearance: Normal appearance. She is well-developed.   HENT:      Head: Normocephalic and atraumatic.      Right Ear: External ear normal.      Left Ear: External ear normal.     Eyes:      Conjunctiva/sclera: Conjunctivae normal.       Cardiovascular:      Rate and Rhythm: Normal rate and regular rhythm.      Heart sounds: Normal heart sounds.   Pulmonary:      Effort: Pulmonary effort is normal.      Breath sounds: Normal breath sounds.     Musculoskeletal:      Cervical back: Normal range of motion.      Comments: Recent left ankle surgery  Uses roller walker to assist with ambulation     Skin:     General: Skin is warm and dry.     Neurological:      Mental Status: She is alert and oriented to person, place, and time.     Psychiatric:         Mood and Affect: Mood normal.         Behavior: Behavior normal.         "

## 2025-07-31 ENCOUNTER — OFFICE VISIT (OUTPATIENT)
Dept: PHYSICAL THERAPY | Facility: REHABILITATION | Age: 60
End: 2025-07-31
Attending: ORTHOPAEDIC SURGERY
Payer: COMMERCIAL

## 2025-07-31 DIAGNOSIS — M19.072 ARTHRITIS OF LEFT SUBTALAR JOINT: Primary | ICD-10-CM

## 2025-07-31 PROCEDURE — 97530 THERAPEUTIC ACTIVITIES: CPT

## 2025-07-31 PROCEDURE — 97110 THERAPEUTIC EXERCISES: CPT

## 2025-07-31 PROCEDURE — 97535 SELF CARE MNGMENT TRAINING: CPT

## 2025-07-31 PROCEDURE — 97140 MANUAL THERAPY 1/> REGIONS: CPT

## 2025-08-07 ENCOUNTER — OFFICE VISIT (OUTPATIENT)
Dept: PHYSICAL THERAPY | Facility: REHABILITATION | Age: 60
End: 2025-08-07
Attending: ORTHOPAEDIC SURGERY
Payer: COMMERCIAL

## 2025-08-07 DIAGNOSIS — M19.072 ARTHRITIS OF LEFT SUBTALAR JOINT: Primary | ICD-10-CM

## 2025-08-07 PROCEDURE — 97140 MANUAL THERAPY 1/> REGIONS: CPT

## 2025-08-07 PROCEDURE — 97110 THERAPEUTIC EXERCISES: CPT

## 2025-08-07 PROCEDURE — 97530 THERAPEUTIC ACTIVITIES: CPT

## 2025-08-11 ENCOUNTER — ANNUAL EXAM (OUTPATIENT)
Dept: OBGYN CLINIC | Facility: CLINIC | Age: 60
End: 2025-08-11
Payer: COMMERCIAL

## 2025-08-14 ENCOUNTER — OFFICE VISIT (OUTPATIENT)
Dept: PHYSICAL THERAPY | Facility: REHABILITATION | Age: 60
End: 2025-08-14
Attending: ORTHOPAEDIC SURGERY
Payer: COMMERCIAL

## 2025-08-21 ENCOUNTER — OFFICE VISIT (OUTPATIENT)
Dept: PHYSICAL THERAPY | Facility: REHABILITATION | Age: 60
End: 2025-08-21
Attending: ORTHOPAEDIC SURGERY
Payer: COMMERCIAL

## 2025-08-21 DIAGNOSIS — M19.072 ARTHRITIS OF LEFT SUBTALAR JOINT: Primary | ICD-10-CM

## 2025-08-21 PROCEDURE — 97110 THERAPEUTIC EXERCISES: CPT

## 2025-08-21 PROCEDURE — 97140 MANUAL THERAPY 1/> REGIONS: CPT

## 2025-08-21 PROCEDURE — 97530 THERAPEUTIC ACTIVITIES: CPT

## 2025-08-23 ENCOUNTER — OFFICE VISIT (OUTPATIENT)
Dept: URGENT CARE | Age: 60
End: 2025-08-23
Payer: COMMERCIAL

## 2025-08-23 VITALS
SYSTOLIC BLOOD PRESSURE: 118 MMHG | DIASTOLIC BLOOD PRESSURE: 63 MMHG | TEMPERATURE: 97.3 F | RESPIRATION RATE: 20 BRPM | OXYGEN SATURATION: 96 % | HEART RATE: 88 BPM

## 2025-08-23 DIAGNOSIS — T63.441A LOCAL REACTION TO BEE STING, ACCIDENTAL OR UNINTENTIONAL, INITIAL ENCOUNTER: Primary | ICD-10-CM

## 2025-08-23 PROCEDURE — 99213 OFFICE O/P EST LOW 20 MIN: CPT | Performed by: STUDENT IN AN ORGANIZED HEALTH CARE EDUCATION/TRAINING PROGRAM

## 2025-08-23 RX ORDER — TRIAMCINOLONE ACETONIDE 1 MG/G
CREAM TOPICAL 2 TIMES DAILY
Qty: 15 G | Refills: 0 | Status: SHIPPED | OUTPATIENT
Start: 2025-08-23 | End: 2025-08-28

## 2025-08-23 RX ORDER — SULFAMETHOXAZOLE AND TRIMETHOPRIM 800; 160 MG/1; MG/1
1 TABLET ORAL EVERY 12 HOURS SCHEDULED
Qty: 10 TABLET | Refills: 0 | Status: SHIPPED | OUTPATIENT
Start: 2025-08-23 | End: 2025-08-28

## 2025-08-23 RX ORDER — TRIAMCINOLONE ACETONIDE 1 MG/G
CREAM TOPICAL 2 TIMES DAILY
Qty: 15 G | Refills: 0 | Status: SHIPPED | OUTPATIENT
Start: 2025-08-23 | End: 2025-08-23

## 2025-08-23 RX ORDER — EPINEPHRINE 0.3 MG/.3ML
0.3 INJECTION SUBCUTANEOUS ONCE
Qty: 0.6 ML | Refills: 0 | Status: SHIPPED | OUTPATIENT
Start: 2025-08-23 | End: 2025-08-23

## (undated) DEVICE — DRAPE SHEET THREE QUARTER

## (undated) DEVICE — ABDOMINAL PAD: Brand: DERMACEA

## (undated) DEVICE — PADDING CAST 4 IN  COTTON STRL

## (undated) DEVICE — GAUZE SPONGES,16 PLY: Brand: CURITY

## (undated) DEVICE — DRILL BIT, AO DIA2.6MM X 135MM, SCALED: Brand: VARIAX

## (undated) DEVICE — PREP PAD BNS: Brand: CONVERTORS

## (undated) DEVICE — BETHLEHEM UNIVERSAL  MIONR EXT: Brand: CARDINAL HEALTH

## (undated) DEVICE — STERILE BETHLEHEM PLASTIC HAND: Brand: CARDINAL HEALTH

## (undated) DEVICE — 3M™ DURAPORE™ SURGICAL TAPE 1538-1, 1 INCH X 10 YARD (2,5CM X 9,1M), 12 ROLLS/BOX: Brand: 3M™ DURAPORE™

## (undated) DEVICE — SUT ETHILON 3-0 PS-1 18 IN 1663G

## (undated) DEVICE — ANTIBACTERIAL UNDYED BRAIDED (POLYGLACTIN 910), SYNTHETIC ABSORBABLE SUTURE: Brand: COATED VICRYL

## (undated) DEVICE — CUFF TOURNIQUET 30 X 4 IN QUICK CONNECT DISP 1BLA

## (undated) DEVICE — CHLORHEXIDINE 4PCT 4 OZ

## (undated) DEVICE — GLOVE INDICATOR PI UNDERGLOVE SZ 8 BLUE

## (undated) DEVICE — OCCLUSIVE GAUZE STRIP,3% BISMUTH TRIBROMOPHENATE IN PETROLATUM BLEND: Brand: XEROFORM

## (undated) DEVICE — ACE WRAP 6 IN UNSTERILE

## (undated) DEVICE — ASTOUND STANDARD SURGICAL GOWN, XXL: Brand: CONVERTORS

## (undated) DEVICE — ACE WRAP 3 IN UNSTERILE

## (undated) DEVICE — SPLINT 5 X 30 IN FAST SET PLASTER

## (undated) DEVICE — UNTHREADED GUIDE WIRE: Brand: FIXOS

## (undated) DEVICE — STEINMANN PIN, SMOOTH
Type: IMPLANTABLE DEVICE | Site: HEEL | Status: NON-FUNCTIONAL
Brand: VARIAX
Removed: 2025-06-05

## (undated) DEVICE — GLOVE SRG BIOGEL 6.5

## (undated) DEVICE — GLOVE SRG BIOGEL 8

## (undated) DEVICE — 3M™ MICROFOAM™ SURGICAL TAPE 4 ROLLS/CARTON 6 CARTONS/CASE 1528-3: Brand: 3M™ MICROFOAM™

## (undated) DEVICE — CAST PADDING 4 IN SYNTHETIC NON-STRL

## (undated) DEVICE — SPONGE SCRUB 4 PCT CHLORHEXIDINE

## (undated) DEVICE — GLOVE SRG BIOGEL 7.5

## (undated) DEVICE — CHLORAPREP HI-LITE 26ML ORANGE

## (undated) DEVICE — PENCILETTE PUSH BUTTON COATED

## (undated) DEVICE — PADDING CAST 3IN COTTON STRL

## (undated) DEVICE — SUT VICRYL 2-0 CT-2 18 IN J726D

## (undated) DEVICE — SUT ETHILON 4-0 PS-2 18 IN 1667H

## (undated) DEVICE — CANNULATED DRILL: Brand: FIXOS

## (undated) DEVICE — INTENDED FOR TISSUE SEPARATION, AND OTHER PROCEDURES THAT REQUIRE A SHARP SURGICAL BLADE TO PUNCTURE OR CUT.: Brand: BARD-PARKER ® CARBON RIB-BACK BLADES

## (undated) DEVICE — PREMIUM DRY TRAY LF: Brand: MEDLINE INDUSTRIES, INC.

## (undated) DEVICE — NEEDLE 25G X 1 1/2

## (undated) DEVICE — GLOVE INDICATOR PI UNDERGLOVE SZ 6.5 BLUE

## (undated) DEVICE — GLOVE PI ULTRA TOUCH SZ.8.0